# Patient Record
Sex: FEMALE | Race: WHITE | Employment: OTHER | URBAN - METROPOLITAN AREA
[De-identification: names, ages, dates, MRNs, and addresses within clinical notes are randomized per-mention and may not be internally consistent; named-entity substitution may affect disease eponyms.]

---

## 2017-02-01 ENCOUNTER — APPOINTMENT (OUTPATIENT)
Dept: GENERAL RADIOLOGY | Age: 72
End: 2017-02-01
Attending: NURSE PRACTITIONER
Payer: MEDICARE

## 2017-02-01 ENCOUNTER — HOSPITAL ENCOUNTER (OUTPATIENT)
Age: 72
Discharge: HOME | End: 2017-02-01
Payer: MEDICARE

## 2017-02-01 VITALS
TEMPERATURE: 99 F | RESPIRATION RATE: 18 BRPM | OXYGEN SATURATION: 98 % | DIASTOLIC BLOOD PRESSURE: 60 MMHG | HEART RATE: 84 BPM | BODY MASS INDEX: 40.18 KG/M2 | WEIGHT: 250 LBS | SYSTOLIC BLOOD PRESSURE: 94 MMHG | HEIGHT: 66 IN

## 2017-02-01 DIAGNOSIS — J40 BRONCHITIS: Primary | ICD-10-CM

## 2017-02-01 PROCEDURE — 71020 XR CHEST PA + LAT CHEST (CPT=71020): CPT

## 2017-02-01 PROCEDURE — 94640 AIRWAY INHALATION TREATMENT: CPT

## 2017-02-01 PROCEDURE — 99204 OFFICE O/P NEW MOD 45 MIN: CPT

## 2017-02-01 PROCEDURE — 99203 OFFICE O/P NEW LOW 30 MIN: CPT

## 2017-02-01 RX ORDER — LISINOPRIL 20 MG/1
20 TABLET ORAL DAILY
COMMUNITY
End: 2017-03-16

## 2017-02-01 RX ORDER — MELOXICAM 15 MG/1
15 TABLET ORAL DAILY
COMMUNITY
End: 2017-05-11

## 2017-02-01 RX ORDER — IPRATROPIUM BROMIDE AND ALBUTEROL SULFATE 2.5; .5 MG/3ML; MG/3ML
3 SOLUTION RESPIRATORY (INHALATION) ONCE
Status: COMPLETED | OUTPATIENT
Start: 2017-02-01 | End: 2017-02-01

## 2017-02-01 RX ORDER — HYDROCHLOROTHIAZIDE 12.5 MG/1
12.5 CAPSULE, GELATIN COATED ORAL DAILY
COMMUNITY
End: 2017-05-11

## 2017-02-01 RX ORDER — PREDNISONE 20 MG/1
20 TABLET ORAL 2 TIMES DAILY
Qty: 10 TABLET | Refills: 0 | Status: SHIPPED | OUTPATIENT
Start: 2017-02-01 | End: 2017-02-06

## 2017-02-01 RX ORDER — SIMVASTATIN 5 MG
20 TABLET ORAL NIGHTLY
COMMUNITY
End: 2017-03-16

## 2017-02-01 RX ORDER — CODEINE PHOSPHATE AND GUAIFENESIN 10; 100 MG/5ML; MG/5ML
5 SOLUTION ORAL EVERY 6 HOURS PRN
Qty: 180 ML | Refills: 0 | Status: SHIPPED | OUTPATIENT
Start: 2017-02-01 | End: 2017-11-01

## 2017-02-01 RX ORDER — ALBUTEROL SULFATE 90 UG/1
2 AEROSOL, METERED RESPIRATORY (INHALATION) EVERY 4 HOURS PRN
Qty: 1 INHALER | Refills: 1 | Status: SHIPPED | OUTPATIENT
Start: 2017-02-01 | End: 2017-03-03

## 2017-02-01 RX ORDER — BENZONATATE 100 MG/1
100 CAPSULE ORAL 3 TIMES DAILY PRN
Qty: 30 CAPSULE | Refills: 0 | Status: SHIPPED | OUTPATIENT
Start: 2017-02-01 | End: 2017-03-02 | Stop reason: ALTCHOICE

## 2017-02-01 RX ORDER — DOXYCYCLINE HYCLATE 100 MG/1
100 CAPSULE ORAL 2 TIMES DAILY
Qty: 14 CAPSULE | Refills: 0 | Status: SHIPPED | OUTPATIENT
Start: 2017-02-01 | End: 2017-02-08

## 2017-02-01 NOTE — ED PROVIDER NOTES
Patient Seen in: [address] [facility]    History   Patient presents with:  Cough    Stated Complaint: coughing    HPI  82-year-old female who presents to the IC with complaints of slight productive cough for the past 2 weeks.   Patient states s  (90 Base) MCG/ACT Inhalation Aero Soln,  Inhale 2 puffs into the lungs every 4 (four) hours as needed. guaiFENesin-codeine (CHERATUSSIN AC) 100-10 MG/5ML Oral Solution,  Take 5 mL by mouth every 6 (six) hours as needed for cough.        No family sounds and intact distal pulses. Pulmonary/Chest: Effort normal. No accessory muscle usage. No respiratory distress. She has no wheezes. She has no rhonchi. She has rales in the right lower field and the left lower field. She exhibits no tenderness. Patient states they understand diagnosis, followup plan and agree with and understand  discharge instructions and plan. I answered all of the patient's questions prior to discharge.      Disposition and Plan     Clinical Impression:  Bronchitis  (primary en

## 2017-03-02 ENCOUNTER — TELEPHONE (OUTPATIENT)
Dept: FAMILY MEDICINE CLINIC | Facility: CLINIC | Age: 72
End: 2017-03-02

## 2017-03-02 ENCOUNTER — OFFICE VISIT (OUTPATIENT)
Dept: FAMILY MEDICINE CLINIC | Facility: CLINIC | Age: 72
End: 2017-03-02

## 2017-03-02 VITALS
BODY MASS INDEX: 42 KG/M2 | RESPIRATION RATE: 18 BRPM | HEART RATE: 70 BPM | SYSTOLIC BLOOD PRESSURE: 132 MMHG | WEIGHT: 260.63 LBS | DIASTOLIC BLOOD PRESSURE: 70 MMHG | TEMPERATURE: 99 F

## 2017-03-02 DIAGNOSIS — I10 ESSENTIAL HYPERTENSION: ICD-10-CM

## 2017-03-02 DIAGNOSIS — Z11.59 NEED FOR HEPATITIS C SCREENING TEST: ICD-10-CM

## 2017-03-02 DIAGNOSIS — G89.29 CHRONIC KNEE PAIN, UNSPECIFIED LATERALITY: ICD-10-CM

## 2017-03-02 DIAGNOSIS — Z13.6 SCREENING FOR CARDIOVASCULAR CONDITION: ICD-10-CM

## 2017-03-02 DIAGNOSIS — C50.919 MALIGNANT NEOPLASM OF FEMALE BREAST, UNSPECIFIED LATERALITY, UNSPECIFIED SITE OF BREAST: ICD-10-CM

## 2017-03-02 DIAGNOSIS — Z13.39 SCREENING FOR ALCOHOL PROBLEM: ICD-10-CM

## 2017-03-02 DIAGNOSIS — Z00.00 ENCOUNTER FOR ANNUAL HEALTH EXAMINATION: Primary | ICD-10-CM

## 2017-03-02 DIAGNOSIS — E78.5 HYPERLIPIDEMIA, UNSPECIFIED HYPERLIPIDEMIA TYPE: ICD-10-CM

## 2017-03-02 DIAGNOSIS — Z13.31 DEPRESSION SCREENING: ICD-10-CM

## 2017-03-02 DIAGNOSIS — M25.569 CHRONIC KNEE PAIN, UNSPECIFIED LATERALITY: ICD-10-CM

## 2017-03-02 DIAGNOSIS — C50.919 MALIGNANT NEOPLASM OF FEMALE BREAST, UNSPECIFIED LATERALITY, UNSPECIFIED SITE OF BREAST: Primary | ICD-10-CM

## 2017-03-02 DIAGNOSIS — Z23 NEED FOR VACCINATION: ICD-10-CM

## 2017-03-02 DIAGNOSIS — Z13.1 SCREENING FOR DIABETES MELLITUS (DM): ICD-10-CM

## 2017-03-02 LAB
ALBUMIN SERPL-MCNC: 4.1 G/DL (ref 3.5–4.8)
ALP LIVER SERPL-CCNC: 85 U/L (ref 55–142)
ALT SERPL-CCNC: 45 U/L (ref 14–54)
AST SERPL-CCNC: 30 U/L (ref 15–41)
BASOPHILS # BLD AUTO: 0.06 X10(3) UL (ref 0–0.1)
BASOPHILS NFR BLD AUTO: 0.9 %
BILIRUB SERPL-MCNC: 0.3 MG/DL (ref 0.1–2)
BUN BLD-MCNC: 28 MG/DL (ref 8–20)
CALCIUM BLD-MCNC: 9.6 MG/DL (ref 8.3–10.3)
CHLORIDE: 109 MMOL/L (ref 101–111)
CHOLEST SMN-MCNC: 167 MG/DL (ref ?–200)
CO2: 28 MMOL/L (ref 22–32)
CREAT BLD-MCNC: 0.8 MG/DL (ref 0.55–1.02)
EOSINOPHIL # BLD AUTO: 0.27 X10(3) UL (ref 0–0.3)
EOSINOPHIL NFR BLD AUTO: 4.1 %
ERYTHROCYTE [DISTWIDTH] IN BLOOD BY AUTOMATED COUNT: 14.3 % (ref 11.5–16)
GLUCOSE BLD-MCNC: 89 MG/DL (ref 70–99)
HCT VFR BLD AUTO: 42 % (ref 34–50)
HDLC SERPL-MCNC: 49 MG/DL (ref 45–?)
HDLC SERPL: 3.41 {RATIO} (ref ?–4.44)
HGB BLD-MCNC: 13.9 G/DL (ref 12–16)
IMMATURE GRANULOCYTE COUNT: 0.02 X10(3) UL (ref 0–1)
IMMATURE GRANULOCYTE RATIO %: 0.3 %
LDLC SERPL CALC-MCNC: 69 MG/DL (ref ?–130)
LYMPHOCYTES # BLD AUTO: 2.59 X10(3) UL (ref 0.9–4)
LYMPHOCYTES NFR BLD AUTO: 39.3 %
M PROTEIN MFR SERPL ELPH: 7.3 G/DL (ref 6.1–8.3)
MCH RBC QN AUTO: 31.2 PG (ref 27–33.2)
MCHC RBC AUTO-ENTMCNC: 33.1 G/DL (ref 31–37)
MCV RBC AUTO: 94.4 FL (ref 81–100)
MONOCYTES # BLD AUTO: 0.6 X10(3) UL (ref 0.1–0.6)
MONOCYTES NFR BLD AUTO: 9.1 %
NEUTROPHIL ABS PRELIM: 3.05 X10 (3) UL (ref 1.3–6.7)
NEUTROPHILS # BLD AUTO: 3.05 X10(3) UL (ref 1.3–6.7)
NEUTROPHILS NFR BLD AUTO: 46.3 %
NONHDLC SERPL-MCNC: 118 MG/DL (ref ?–130)
PLATELET # BLD AUTO: 283 10(3)UL (ref 150–450)
POTASSIUM SERPL-SCNC: 4.2 MMOL/L (ref 3.6–5.1)
RBC # BLD AUTO: 4.45 X10(6)UL (ref 3.8–5.1)
RED CELL DISTRIBUTION WIDTH-SD: 49.6 FL (ref 35.1–46.3)
SODIUM SERPL-SCNC: 145 MMOL/L (ref 136–144)
TRIGLYCERIDES: 247 MG/DL (ref ?–150)
TSI SER-ACNC: 0.81 MIU/ML (ref 0.35–5.5)
VLDL: 49 MG/DL (ref 5–40)
WBC # BLD AUTO: 6.6 X10(3) UL (ref 4–13)

## 2017-03-02 PROCEDURE — 80061 LIPID PANEL: CPT | Performed by: FAMILY MEDICINE

## 2017-03-02 PROCEDURE — 86803 HEPATITIS C AB TEST: CPT | Performed by: FAMILY MEDICINE

## 2017-03-02 PROCEDURE — 85025 COMPLETE CBC W/AUTO DIFF WBC: CPT | Performed by: FAMILY MEDICINE

## 2017-03-02 PROCEDURE — 80053 COMPREHEN METABOLIC PANEL: CPT | Performed by: FAMILY MEDICINE

## 2017-03-02 PROCEDURE — 96160 PT-FOCUSED HLTH RISK ASSMT: CPT | Performed by: FAMILY MEDICINE

## 2017-03-02 PROCEDURE — 84443 ASSAY THYROID STIM HORMONE: CPT | Performed by: FAMILY MEDICINE

## 2017-03-02 NOTE — PROGRESS NOTES
HPI:   Roxana Reynaga is a 73year old female who presents for a MA (Medicare Advantage) [facility] (Once per calendar year). Under treatment for breast cancer.      Her last annual assessment has been over 1 year: Annual Physical due on 10/20 (3/13/09); other surgical history (1/20/10); other surgical history (12/11/12); other surgical history (2/6/15); and other surgical history (1/5/16). Her family history is not on file. SOCIAL HISTORY:   She  reports that she has never smoked.  She does Influenza, Pneumococcal, Zoster, Tetanus   There is no immunization history for the selected administration types on file for this patient.     ASSESSMENT AND OTHER RELEVANT CHRONIC CONDITIONS:   Keri Neri is a 73year old female who presents lipids. - Lipid Panel; Future  - Lipid Panel    3. Encounter for annual health examination  Check labs. - Lipid Panel; Future  - HCV AB for  1945 thru 1965 (Once in a lifetime);  Future  - Depression Screening (Medicare, Annual) []  - Annual Alc not have a Power of  for [employer] on file in [address] [place] [address] Rd. Discussed with patient and patient refused resource information          PLAN:  The patient indicates understanding of these issues and agrees to the plan. No Follow-up on file.      Rubens Quinones you have any tripping hazards?: 0-No    Are you on multiple medications?: 1-Yes    Does pain affect your day to day activities?: 1-Yes (right knee pain)     Have you had any memory issues?: 0-No    Fall/Risk Scoring: 3    Scoring Interpretation: 0 - 3 No R Diabetics, FHx Glaucoma, AA>50, > 65 No flowsheet data found. Bone Density Screening      Dexascan Every two years No results found for this or any previous visit. No flowsheet data found.     Pap and Pelvic      Pap: Every 3 yrs age 21-65 or Pap Template: CHRIS MAYA MEDICARE ANNUAL ASSESSMENT FEMALE [73464]

## 2017-03-02 NOTE — PATIENT INSTRUCTIONS
Kaylin Hernandez's SCREENING SCHEDULE   Tests on this list are recommended by your physician but may not be covered, or covered at this frequency, by your insurer. Please check with your insurance carrier before scheduling to verify coverage.    PA every 5 years No results found for this or any previous visit. No flowsheet data found. Fecal Occult Blood   Covered Annually No results found for: FOB, OCCULTSTOOL No flowsheet data found.      Barium Enema-   uncomfortable but covered  Covered but unc orders found for this or any previous visit.  Medium/high risk factors:   End-stage renal disease   Hemophiliacs who received Factor VIII or IX concentrates   Clients of institutions for the mentally retarded   Persons who live in the same house as a HepB v Patient must be diagnosed with one of the following:   • Hypertension   • Dyslipidemia   • Obesity (BMI ³30 kg/m2)   • Previous elevated impaired FBS or GTT   … or any two of the following:   • Overweight (BMI ³25 but <30)   • Family history of diabetes group, make sure you have a referral   Bone Density Screening      Bone density screening   Covered every 2 yrs after age 72    Covered yearly for Long term Glucocorticoid medication (Steroids) Requires diagnosis related to osteoporosis or estrogen deficie may be covered with your prescription benefits, but Medicare does not cover unless Medically needed    Zoster (Not covered by Medicare Part B) No orders found for this or any previous visit.  This may be covered with your pharmacy  prescription benefits

## 2017-03-03 LAB — HEPATITIS C VIRUS AB INTERPRETATION: NONREACTIVE

## 2017-03-16 RX ORDER — SIMVASTATIN 5 MG
20 TABLET ORAL NIGHTLY
Qty: 90 TABLET | Refills: 0 | Status: SHIPPED | OUTPATIENT
Start: 2017-03-16 | End: 2017-04-27

## 2017-03-16 RX ORDER — LISINOPRIL 20 MG/1
20 TABLET ORAL DAILY
Qty: 90 TABLET | Refills: 0 | Status: SHIPPED | OUTPATIENT
Start: 2017-03-16 | End: 2017-04-27

## 2017-03-16 NOTE — TELEPHONE ENCOUNTER
LOV: 3/2/17 for annual  Physical  BP at time of visit: 132/70      simvastatin 5 MG Oral Tab         Sig :  Take 20 mg by mouth nightly.       Route:   Oral        lisinopril 20 MG Oral Tab         Sig :  Take 20 mg by mouth daily.       Route:   Oral

## 2017-03-21 ENCOUNTER — TELEPHONE (OUTPATIENT)
Dept: FAMILY MEDICINE CLINIC | Facility: CLINIC | Age: 72
End: 2017-03-21

## 2017-03-21 NOTE — TELEPHONE ENCOUNTER
Advised patient not to stop taking her medication at this time. Educated patient on possible adverse side effects regarding discontinuing medication (lisinopril and simvastatin) without order of physician.   Patient wanted to know if she could stop taking

## 2017-04-27 PROBLEM — E66.01 SEVERE OBESITY (BMI >= 40) (HCC): Chronic | Status: ACTIVE | Noted: 2017-04-27

## 2017-04-27 NOTE — TELEPHONE ENCOUNTER
LOV: 3/2/17 for annual health exam    lisinopril 20 MG Oral Tab 90 tablet 0 3/16/2017       Sig :  Take 1 tablet (20 mg total) by mouth daily.       Route:   Oral       simvastatin 5 MG Oral Tab 90 tablet 0 3/16/2017       Sig :  Take 4 tablets (20 mg tota

## 2017-04-28 RX ORDER — LISINOPRIL 20 MG/1
20 TABLET ORAL DAILY
Qty: 90 TABLET | Refills: 0 | Status: SHIPPED | OUTPATIENT
Start: 2017-04-28 | End: 2017-09-18

## 2017-04-28 RX ORDER — SIMVASTATIN 5 MG
20 TABLET ORAL NIGHTLY
Qty: 90 TABLET | Refills: 0 | Status: SHIPPED | OUTPATIENT
Start: 2017-04-28 | End: 2017-09-18

## 2017-05-11 ENCOUNTER — OFFICE VISIT (OUTPATIENT)
Dept: HEMATOLOGY/ONCOLOGY | Age: 72
End: 2017-05-11
Attending: INTERNAL MEDICINE
Payer: MEDICARE

## 2017-05-11 VITALS
TEMPERATURE: 99 F | BODY MASS INDEX: 40 KG/M2 | OXYGEN SATURATION: 93 % | DIASTOLIC BLOOD PRESSURE: 69 MMHG | HEART RATE: 72 BPM | RESPIRATION RATE: 20 BRPM | WEIGHT: 248 LBS | SYSTOLIC BLOOD PRESSURE: 116 MMHG

## 2017-05-11 DIAGNOSIS — C50.919 MALIGNANT NEOPLASM OF FEMALE BREAST (HCC): Primary | ICD-10-CM

## 2017-05-11 DIAGNOSIS — Z12.31 ENCOUNTER FOR SCREENING MAMMOGRAM FOR MALIGNANT NEOPLASM OF BREAST: ICD-10-CM

## 2017-05-11 PROCEDURE — 99205 OFFICE O/P NEW HI 60 MIN: CPT | Performed by: INTERNAL MEDICINE

## 2017-05-11 RX ORDER — GARLIC EXTRACT 500 MG
1 CAPSULE ORAL DAILY
COMMUNITY
End: 2017-12-27 | Stop reason: ALTCHOICE

## 2017-05-11 RX ORDER — MULTIVIT-MIN/IRON/FOLIC ACID/K 18-600-40
CAPSULE ORAL
COMMUNITY
End: 2018-02-13

## 2017-05-11 RX ORDER — CHLORAL HYDRATE 500 MG
1000 CAPSULE ORAL DAILY
COMMUNITY
End: 2018-02-13

## 2017-05-11 NOTE — CONSULTS
Cancer Center Report of Consultation    Patient Name: Negin Ho   YOB: 1945   Medical Record Number: [id number]   CSN: [id number]   Consulting Physician: Brenda Carlos M.D.    Referring Physician: Aminata Rainey    Date of Consult eye    CATARACT  10/28/08    Comment Left eye    COLONOSCOPY  11/17/09    Comment Repeat in 10 years    COLONOSCOPY  1/26/16    OTHER SURGICAL HISTORY  2/11/93    Comment Fractured L ankle.  1 plate and 8 scres    OTHER SURGICAL HISTORY  11/19/08    Comment for 14 days and stop, Disp: 85 g, Rfl: 0  •  guaiFENesin-codeine (CHERATUSSIN AC) 100-10 MG/5ML Oral Solution, Take 5 mL by mouth every 6 (six) hours as needed for cough. , Disp: 180 mL, Rfl: 0    Allergies:    Sulfa Antibiotics       Hives, Swelling     Re palpable lymph nodes in the cervical, supraclavicular, axillary or inguinal area. Respiratory Normal - Lungs are clear to auscultation without rhonchi or wheezing.    Cardiovascular Normal - Regular rate and rhythm of heart without murmurs, gallops or rub CHOL Latest Ref Range: <130 mg/dL 118   LDL Cholesterol Calc Latest Ref Range: <130 mg/dL 69   TSH Latest Ref Range: 0.350-5.500 mIU/mL 0.813   HCV AB Latest Ref Range: Nonreactive   Nonreactive   WBC Latest Ref Range: 4.0-13.0 x10(3) uL 6.6   Hemoglobin L continue to monitor with exam q6 months and annual mammogram. The patient requests that a mammogram with brenton be ordered, even if she must pay a larger copay. Planned Follow Up:  6 months    I spent 60 minutes face to face with the patient.   More than 5

## 2017-05-16 PROBLEM — M17.12 PRIMARY OSTEOARTHRITIS OF LEFT KNEE: Status: ACTIVE | Noted: 2017-05-16

## 2017-05-16 PROBLEM — M16.0 PRIMARY OSTEOARTHRITIS OF BOTH HIPS: Status: ACTIVE | Noted: 2017-05-16

## 2017-05-16 PROBLEM — M17.11 PRIMARY OSTEOARTHRITIS OF RIGHT KNEE: Status: ACTIVE | Noted: 2017-05-16

## 2017-05-30 ENCOUNTER — OFFICE VISIT (OUTPATIENT)
Dept: FAMILY MEDICINE CLINIC | Facility: CLINIC | Age: 72
End: 2017-05-30

## 2017-05-30 VITALS
BODY MASS INDEX: 39.05 KG/M2 | TEMPERATURE: 98 F | HEART RATE: 66 BPM | SYSTOLIC BLOOD PRESSURE: 145 MMHG | WEIGHT: 243 LBS | RESPIRATION RATE: 16 BRPM | HEIGHT: 66 IN | DIASTOLIC BLOOD PRESSURE: 88 MMHG

## 2017-05-30 DIAGNOSIS — R35.0 URINARY FREQUENCY: Primary | ICD-10-CM

## 2017-05-30 DIAGNOSIS — N30.00 ACUTE CYSTITIS WITHOUT HEMATURIA: ICD-10-CM

## 2017-05-30 PROCEDURE — 99214 OFFICE O/P EST MOD 30 MIN: CPT | Performed by: FAMILY MEDICINE

## 2017-05-30 PROCEDURE — 81003 URINALYSIS AUTO W/O SCOPE: CPT | Performed by: FAMILY MEDICINE

## 2017-05-30 PROCEDURE — 87186 SC STD MICRODIL/AGAR DIL: CPT | Performed by: FAMILY MEDICINE

## 2017-05-30 PROCEDURE — 87088 URINE BACTERIA CULTURE: CPT | Performed by: FAMILY MEDICINE

## 2017-05-30 PROCEDURE — 87086 URINE CULTURE/COLONY COUNT: CPT | Performed by: FAMILY MEDICINE

## 2017-05-30 RX ORDER — CIPROFLOXACIN 500 MG/1
TABLET, FILM COATED ORAL
Qty: 10 TABLET | Refills: 0 | Status: SHIPPED | OUTPATIENT
Start: 2017-05-30 | End: 2017-11-01 | Stop reason: ALTCHOICE

## 2017-05-30 NOTE — PROGRESS NOTES
CC: urinary symptoms    HPI:      Location urethral/bladder  Quality frequency, urgency  Severity moderate  Duration couple days    ROS:     GENERAL HEALTH: feels well otherwise  SKIN: denies any unusual skin lesions or rashes  RESPIRATORY: denies shortnes

## 2017-06-26 ENCOUNTER — TELEPHONE (OUTPATIENT)
Dept: FAMILY MEDICINE CLINIC | Facility: CLINIC | Age: 72
End: 2017-06-26

## 2017-06-26 ENCOUNTER — NURSE ONLY (OUTPATIENT)
Dept: FAMILY MEDICINE CLINIC | Facility: CLINIC | Age: 72
End: 2017-06-26

## 2017-06-26 DIAGNOSIS — R33.8 ACUTE URINARY RETENTION: ICD-10-CM

## 2017-06-26 DIAGNOSIS — R33.8 ACUTE URINARY RETENTION: Primary | ICD-10-CM

## 2017-06-26 PROCEDURE — 87086 URINE CULTURE/COLONY COUNT: CPT | Performed by: FAMILY MEDICINE

## 2017-06-26 NOTE — TELEPHONE ENCOUNTER
LOV  5/30/17 for UTI  Patient started on CIPRO BID for 5 days. Patient stated she was suppose to drop off a urine specimen to Check for UTI after completing CIPRO. No note in patient's chart that this was the plan.   Patient stated she sometimes she feels

## 2017-09-18 RX ORDER — LISINOPRIL 20 MG/1
20 TABLET ORAL DAILY
Qty: 90 TABLET | Refills: 0 | Status: SHIPPED | OUTPATIENT
Start: 2017-09-18 | End: 2017-12-27 | Stop reason: ALTCHOICE

## 2017-09-18 RX ORDER — SIMVASTATIN 5 MG
20 TABLET ORAL NIGHTLY
Qty: 90 TABLET | Refills: 0 | Status: SHIPPED | OUTPATIENT
Start: 2017-09-18 | End: 2017-11-01

## 2017-09-18 NOTE — TELEPHONE ENCOUNTER
LOV: 5/30/17 for urinary frequency  /88  Lipid: 3/2/17    lisinopril 20 MG Oral Tab 90 tablet 0 4/28/2017    Sig :  Take 1 tablet (20 mg total) by mouth daily.      Route:   Oral       simvastatin 5 MG Oral Tab 90 tablet 0 4/28/2017    Sig :  Take 4 t

## 2017-10-04 ENCOUNTER — TELEPHONE (OUTPATIENT)
Dept: HEMATOLOGY/ONCOLOGY | Facility: HOSPITAL | Age: 72
End: 2017-10-04

## 2017-10-04 NOTE — TELEPHONE ENCOUNTER
Has not scheduled appointment for Mammogram or MD visit. Instructed to call central scheduling to arrange appt. She will need to have disc from last 2 facilities for radiology to compare. Verbalized understanding.

## 2017-10-20 ENCOUNTER — MED REC SCAN ONLY (OUTPATIENT)
Dept: FAMILY MEDICINE CLINIC | Facility: CLINIC | Age: 72
End: 2017-10-20

## 2017-10-27 ENCOUNTER — TELEPHONE (OUTPATIENT)
Dept: HEMATOLOGY/ONCOLOGY | Facility: HOSPITAL | Age: 72
End: 2017-10-27

## 2017-11-01 ENCOUNTER — OFFICE VISIT (OUTPATIENT)
Dept: FAMILY MEDICINE CLINIC | Facility: CLINIC | Age: 72
End: 2017-11-01

## 2017-11-01 VITALS
HEART RATE: 66 BPM | TEMPERATURE: 97 F | WEIGHT: 223 LBS | DIASTOLIC BLOOD PRESSURE: 68 MMHG | SYSTOLIC BLOOD PRESSURE: 140 MMHG | BODY MASS INDEX: 35.84 KG/M2 | HEIGHT: 66 IN | OXYGEN SATURATION: 98 % | RESPIRATION RATE: 20 BRPM

## 2017-11-01 DIAGNOSIS — E66.01 SEVERE OBESITY (BMI >= 40) (HCC): Chronic | ICD-10-CM

## 2017-11-01 DIAGNOSIS — H10.9 CONJUNCTIVITIS OF BOTH EYES, UNSPECIFIED CONJUNCTIVITIS TYPE: Primary | ICD-10-CM

## 2017-11-01 PROCEDURE — 99213 OFFICE O/P EST LOW 20 MIN: CPT | Performed by: FAMILY MEDICINE

## 2017-11-01 RX ORDER — CIPROFLOXACIN HYDROCHLORIDE 3.5 MG/ML
1 SOLUTION/ DROPS TOPICAL 4 TIMES DAILY
Qty: 1 BOTTLE | Refills: 0 | Status: SHIPPED | OUTPATIENT
Start: 2017-11-01 | End: 2017-11-06

## 2017-11-01 NOTE — TELEPHONE ENCOUNTER
LOV-5/30/17 urinary, 3/2/17 annual physical  LRF-9/18/17 #90+0  Future Appointments  Date Time Provider Cristina Hanna   12/18/2017 9:20 AM PF CAMMIE RM1 PF [facility]ON[facility] [place]   12/19/2017 1:45 PM Cathy Carlisle MD [facility]

## 2017-11-01 NOTE — PROGRESS NOTES
HPI:    Patient ID: Yvonne Birch is a 81year old female. Patient presents with:  Eye Problem    HPI  Left eye redness for 2-3 days  Right eye red today. Woke with crusty drainage in left eye. No eye pain or itchy.       Review of Systems   Constitu LUMPECTOMY LEFT      Comment: L breast lumpectomy w/sentinel lymph node                biopsy and lymph node dissection  2/11/93: OTHER SURGICAL HISTORY      Comment: Fractured L ankle.  1 plate and 8 scres  28/37/80: OTHER SURGICAL HISTORY      Comment: Or this encounter. Meds This Visit:  Signed Prescriptions Disp Refills    ciprofloxacin HCl 0.3 % Ophthalmic Solution 1 Bottle 0      Sig: Place 1 drop into both eyes 4 (four) times daily.            Imaging & Referrals:  None        #1103

## 2017-11-02 RX ORDER — SIMVASTATIN 5 MG
20 TABLET ORAL NIGHTLY
Qty: 90 TABLET | Refills: 0 | Status: SHIPPED | OUTPATIENT
Start: 2017-11-02 | End: 2017-11-02

## 2017-11-02 RX ORDER — SIMVASTATIN 20 MG
20 TABLET ORAL NIGHTLY
Qty: 90 TABLET | Refills: 0 | Status: SHIPPED | OUTPATIENT
Start: 2017-11-02 | End: 2017-12-27

## 2017-11-07 ENCOUNTER — OFFICE VISIT (OUTPATIENT)
Dept: FAMILY MEDICINE CLINIC | Facility: CLINIC | Age: 72
End: 2017-11-07

## 2017-11-07 ENCOUNTER — TELEPHONE (OUTPATIENT)
Dept: FAMILY MEDICINE CLINIC | Facility: CLINIC | Age: 72
End: 2017-11-07

## 2017-11-07 VITALS
HEIGHT: 66 IN | HEART RATE: 72 BPM | DIASTOLIC BLOOD PRESSURE: 66 MMHG | TEMPERATURE: 99 F | SYSTOLIC BLOOD PRESSURE: 134 MMHG | OXYGEN SATURATION: 96 % | BODY MASS INDEX: 35.68 KG/M2 | RESPIRATION RATE: 16 BRPM | WEIGHT: 222 LBS

## 2017-11-07 DIAGNOSIS — J01.10 ACUTE NON-RECURRENT FRONTAL SINUSITIS: Primary | ICD-10-CM

## 2017-11-07 PROCEDURE — 99214 OFFICE O/P EST MOD 30 MIN: CPT | Performed by: FAMILY MEDICINE

## 2017-11-07 RX ORDER — AZITHROMYCIN 250 MG/1
TABLET, FILM COATED ORAL
Qty: 6 TABLET | Refills: 0 | Status: SHIPPED | OUTPATIENT
Start: 2017-11-07 | End: 2017-12-27 | Stop reason: ALTCHOICE

## 2017-11-07 RX ORDER — CIPROFLOXACIN HYDROCHLORIDE 3.5 MG/ML
SOLUTION/ DROPS TOPICAL
COMMUNITY
End: 2017-12-27 | Stop reason: ALTCHOICE

## 2017-11-07 NOTE — TELEPHONE ENCOUNTER
Pink Eye is not better. She said that it was mentioned that another medicine but work if this doesn't but it has sulfer in it and she is allergic.

## 2017-11-07 NOTE — TELEPHONE ENCOUNTER
Patient was seen by Dr. Carey Sevilla 11/1  Patients pink eye has not resolved. Patient is taking cipro drops for eyes. Patient states she has congestion. Advised patient to follow up with Dr. Ivania Bullock for re-eval.  Patient verbalized understanding.   Apt schedule

## 2017-11-07 NOTE — PROGRESS NOTES
CC: congestion    HPI:     Location frontal  Quality pressure, drainage  Severity moderate  Duration couple weeks  Timing frequent  Associated symptoms cough    ROS: no vomiting or diarrhea, no  Rash, eyes still slightly irritated    Past Medical History:

## 2017-11-08 ENCOUNTER — PATIENT OUTREACH (OUTPATIENT)
Dept: FAMILY MEDICINE CLINIC | Facility: CLINIC | Age: 72
End: 2017-11-08

## 2017-11-20 ENCOUNTER — TELEPHONE (OUTPATIENT)
Dept: FAMILY MEDICINE CLINIC | Facility: CLINIC | Age: 72
End: 2017-11-20

## 2017-11-20 NOTE — TELEPHONE ENCOUNTER
Patient seen 11/7/17 for sinus infection with Dr. Nicolette Dobson  Patient is currently out of town for the next week- [place]  No fever. No cough. Sinus pressure. Headache. Left ear pain. Red watery eyees  Patient states [employer] did not work.   Patient is out of

## 2017-11-20 NOTE — TELEPHONE ENCOUNTER
Pt saw  [facility] on 11/7 for sinus infection and was prescribed zpak. If no fever or facial pain, monitor for next 2-3 days. Use OTC decongestant for sx relief.

## 2017-11-30 ENCOUNTER — MED REC SCAN ONLY (OUTPATIENT)
Dept: FAMILY MEDICINE CLINIC | Facility: CLINIC | Age: 72
End: 2017-11-30

## 2017-11-30 ENCOUNTER — TELEPHONE (OUTPATIENT)
Dept: FAMILY MEDICINE CLINIC | Facility: CLINIC | Age: 72
End: 2017-11-30

## 2017-11-30 NOTE — TELEPHONE ENCOUNTER
Sent signed medical records request to [address] [place] [address] in [place], [place] for release of all records.

## 2017-12-14 ENCOUNTER — APPOINTMENT (OUTPATIENT)
Dept: HEMATOLOGY/ONCOLOGY | Age: 72
End: 2017-12-14
Attending: INTERNAL MEDICINE
Payer: MEDICARE

## 2017-12-18 ENCOUNTER — HOSPITAL ENCOUNTER (OUTPATIENT)
Dept: MAMMOGRAPHY | Age: 72
Discharge: HOME | End: 2017-12-18
Attending: INTERNAL MEDICINE
Payer: MEDICARE

## 2017-12-18 DIAGNOSIS — Z12.31 ENCOUNTER FOR SCREENING MAMMOGRAM FOR MALIGNANT NEOPLASM OF BREAST: ICD-10-CM

## 2017-12-18 DIAGNOSIS — C50.919 MALIGNANT NEOPLASM OF FEMALE BREAST (HCC): ICD-10-CM

## 2017-12-18 PROCEDURE — 77063 BREAST TOMOSYNTHESIS BI: CPT | Performed by: INTERNAL MEDICINE

## 2017-12-18 PROCEDURE — 77067 SCR MAMMO BI INCL CAD: CPT | Performed by: INTERNAL MEDICINE

## 2017-12-19 ENCOUNTER — OFFICE VISIT (OUTPATIENT)
Dept: HEMATOLOGY/ONCOLOGY | Facility: HOSPITAL | Age: 72
End: 2017-12-19
Attending: INTERNAL MEDICINE
Payer: MEDICARE

## 2017-12-19 VITALS
OXYGEN SATURATION: 93 % | HEIGHT: 66 IN | HEART RATE: 68 BPM | TEMPERATURE: 99 F | RESPIRATION RATE: 18 BRPM | BODY MASS INDEX: 35.81 KG/M2 | WEIGHT: 222.81 LBS | SYSTOLIC BLOOD PRESSURE: 116 MMHG | DIASTOLIC BLOOD PRESSURE: 75 MMHG

## 2017-12-19 DIAGNOSIS — C50.312 MALIGNANT NEOPLASM OF LOWER-INNER QUADRANT OF LEFT BREAST IN FEMALE, ESTROGEN RECEPTOR POSITIVE (HCC): Primary | ICD-10-CM

## 2017-12-19 DIAGNOSIS — Z17.0 MALIGNANT NEOPLASM OF LOWER-INNER QUADRANT OF LEFT BREAST IN FEMALE, ESTROGEN RECEPTOR POSITIVE (HCC): Primary | ICD-10-CM

## 2017-12-19 PROCEDURE — 99213 OFFICE O/P EST LOW 20 MIN: CPT | Performed by: INTERNAL MEDICINE

## 2017-12-27 ENCOUNTER — OFFICE VISIT (OUTPATIENT)
Dept: FAMILY MEDICINE CLINIC | Facility: CLINIC | Age: 72
End: 2017-12-27

## 2017-12-27 VITALS
TEMPERATURE: 99 F | HEART RATE: 67 BPM | OXYGEN SATURATION: 97 % | BODY MASS INDEX: 36.08 KG/M2 | DIASTOLIC BLOOD PRESSURE: 86 MMHG | HEIGHT: 66 IN | SYSTOLIC BLOOD PRESSURE: 134 MMHG | WEIGHT: 224.5 LBS

## 2017-12-27 DIAGNOSIS — E78.5 HYPERLIPIDEMIA, UNSPECIFIED HYPERLIPIDEMIA TYPE: ICD-10-CM

## 2017-12-27 DIAGNOSIS — Z23 NEED FOR VACCINATION: ICD-10-CM

## 2017-12-27 DIAGNOSIS — J30.89 ACUTE NONSEASONAL ALLERGIC RHINITIS DUE TO OTHER ALLERGEN: Primary | ICD-10-CM

## 2017-12-27 DIAGNOSIS — C50.919 MALIGNANT NEOPLASM OF FEMALE BREAST, UNSPECIFIED ESTROGEN RECEPTOR STATUS, UNSPECIFIED LATERALITY, UNSPECIFIED SITE OF BREAST (HCC): ICD-10-CM

## 2017-12-27 DIAGNOSIS — I10 ESSENTIAL HYPERTENSION: ICD-10-CM

## 2017-12-27 DIAGNOSIS — Z01.00 EYE EXAM, ROUTINE: ICD-10-CM

## 2017-12-27 PROBLEM — J30.9 ALLERGIC RHINITIS DUE TO ALLERGEN: Status: ACTIVE | Noted: 2017-12-27

## 2017-12-27 PROCEDURE — 90686 IIV4 VACC NO PRSV 0.5 ML IM: CPT | Performed by: FAMILY MEDICINE

## 2017-12-27 PROCEDURE — 99214 OFFICE O/P EST MOD 30 MIN: CPT | Performed by: FAMILY MEDICINE

## 2017-12-27 PROCEDURE — 90472 IMMUNIZATION ADMIN EACH ADD: CPT | Performed by: FAMILY MEDICINE

## 2017-12-27 PROCEDURE — 90715 TDAP VACCINE 7 YRS/> IM: CPT | Performed by: FAMILY MEDICINE

## 2017-12-27 PROCEDURE — G0008 ADMIN INFLUENZA VIRUS VAC: HCPCS | Performed by: FAMILY MEDICINE

## 2017-12-27 RX ORDER — FLUTICASONE PROPIONATE 50 MCG
2 SPRAY, SUSPENSION (ML) NASAL DAILY
Qty: 1 BOTTLE | Refills: 3 | COMMUNITY
Start: 2017-12-27 | End: 2018-02-13

## 2017-12-27 RX ORDER — VALSARTAN 80 MG/1
80 TABLET ORAL DAILY
Qty: 90 TABLET | Refills: 3 | Status: SHIPPED | OUTPATIENT
Start: 2017-12-27 | End: 2018-02-07 | Stop reason: SINTOL

## 2017-12-27 RX ORDER — SIMVASTATIN 20 MG
20 TABLET ORAL NIGHTLY
Qty: 90 TABLET | Refills: 3 | Status: SHIPPED | OUTPATIENT
Start: 2017-12-27 | End: 2018-04-11

## 2017-12-27 RX ORDER — FEXOFENADINE HCL 180 MG/1
180 TABLET ORAL DAILY
Qty: 90 TABLET | Refills: 0 | COMMUNITY
End: 2018-02-13

## 2017-12-27 NOTE — PROGRESS NOTES
Michaela Chery is a 81year old female. Patient presents with: Other: new pt, sinus issues for several months. ....discuss flu and pneumonia shot also. ...room 1      HPI:   Patient complains of sinus congestion that she has had for many months.   No fev OTHER SURGICAL HISTORY      Comment: L breast biopsy  Family History   Problem Relation Age of Onset   • Breast Cancer Self 78        Social History:  Smoking status: Never Smoker                                                              Smokeless tobac and continue them daily throughout the season. If symptoms do not respond to several different meds and combinations, may need formal allergy testing. Reviewed her medical history and previous records. Still need to receive her immunization records.   Christel

## 2018-01-18 ENCOUNTER — PATIENT MESSAGE (OUTPATIENT)
Dept: FAMILY MEDICINE CLINIC | Facility: CLINIC | Age: 73
End: 2018-01-18

## 2018-01-18 NOTE — TELEPHONE ENCOUNTER
Called pt. And let her know I do not see that we received the records and she should contact the facility they were coming from to check on the status.

## 2018-01-25 NOTE — PROGRESS NOTES
Cancer Center Progress Note  Patient Name: Hilary De La Garza   YOB: 1945   Medical Record Number: [id number]   CSN: [id number]   Attending Physician: Susanna Laughlin M.D.        Date of Visit: 1/25/2018     Chief Complaint:  Patient prese and 8 scres  11/19/08: OTHER SURGICAL HISTORY      Comment: Oral surgery-2 cyst and 2 wisdom teeth  3/13/09: OTHER SURGICAL HISTORY      Comment: Kidney stones removed  1/20/10: OTHER SURGICAL HISTORY      Comment: Colposcopy  12/11/12: OTHER SURGICAL HIST Citrate-Vitamin D (CITRACAL PETITES/VITAMIN D OR), Take by mouth., Disp: , Rfl:     Allergies:    Sulfa Antibiotics       Hives, Swelling  Lisinopril                  Comment:cough     Review of Systems:    Constitutional No fevers, chills, night sweats, e Integumentary Normal - No rashes, No Jaundice   Neurologic Normal - No sensory or motor deficits, normal cerebellar function, normal gait, cranial nerves intact. Psychiatric Normal - Alert and oriented times three. Coherent speech.  Verbalizes [place]

## 2018-02-01 ENCOUNTER — PATIENT OUTREACH (OUTPATIENT)
Dept: FAMILY MEDICINE CLINIC | Facility: CLINIC | Age: 73
End: 2018-02-01

## 2018-02-07 ENCOUNTER — PATIENT MESSAGE (OUTPATIENT)
Dept: FAMILY MEDICINE CLINIC | Facility: CLINIC | Age: 73
End: 2018-02-07

## 2018-02-07 RX ORDER — AMLODIPINE BESYLATE 5 MG/1
5 TABLET ORAL EVERY MORNING
Qty: 90 TABLET | Refills: 0 | Status: SHIPPED | OUTPATIENT
Start: 2018-02-07 | End: 2018-04-11

## 2018-02-07 NOTE — TELEPHONE ENCOUNTER
Yonathan Godinez RN 2/7/2018 10:33 AM CST        ----- Message -----  From: Lucina Cason  Sent: 2/7/2018 10:14 AM  To: [facility] Clinical Staff  Subject: Prescription Question     After starting my new prescription Valsartan (80 mg), I started coughing

## 2018-02-07 NOTE — TELEPHONE ENCOUNTER
From: Michaela Chery  To: Ramon Li DO  Sent: 2/7/2018 12:27 PM CST  Subject: Other    Thank you for your prompt reply. Could you please send the new prescription to [facility]? I think you have the information. Thanks again!  Claudetta Furlough

## 2018-02-07 NOTE — TELEPHONE ENCOUNTER
Remain off valsartan  Start amlodipine 5 mg 1 po q am  Recheck BP here in 2-3 weeks to make sure BP controlled

## 2018-02-27 ENCOUNTER — MED REC SCAN ONLY (OUTPATIENT)
Dept: FAMILY MEDICINE CLINIC | Facility: CLINIC | Age: 73
End: 2018-02-27

## 2018-04-11 ENCOUNTER — LAB ENCOUNTER (OUTPATIENT)
Dept: LAB | Age: 73
End: 2018-04-11
Attending: FAMILY MEDICINE
Payer: MEDICARE

## 2018-04-11 ENCOUNTER — OFFICE VISIT (OUTPATIENT)
Dept: FAMILY MEDICINE CLINIC | Facility: CLINIC | Age: 73
End: 2018-04-11

## 2018-04-11 ENCOUNTER — TELEPHONE (OUTPATIENT)
Dept: FAMILY MEDICINE CLINIC | Facility: CLINIC | Age: 73
End: 2018-04-11

## 2018-04-11 VITALS
WEIGHT: 229.5 LBS | HEART RATE: 63 BPM | TEMPERATURE: 99 F | SYSTOLIC BLOOD PRESSURE: 112 MMHG | OXYGEN SATURATION: 97 % | BODY MASS INDEX: 37.78 KG/M2 | DIASTOLIC BLOOD PRESSURE: 74 MMHG | HEIGHT: 65.5 IN

## 2018-04-11 DIAGNOSIS — M17.0 PRIMARY OSTEOARTHRITIS OF BOTH KNEES: ICD-10-CM

## 2018-04-11 DIAGNOSIS — E78.5 HYPERLIPIDEMIA, UNSPECIFIED HYPERLIPIDEMIA TYPE: ICD-10-CM

## 2018-04-11 DIAGNOSIS — I10 ESSENTIAL HYPERTENSION: ICD-10-CM

## 2018-04-11 DIAGNOSIS — C50.919 MALIGNANT NEOPLASM OF FEMALE BREAST, UNSPECIFIED ESTROGEN RECEPTOR STATUS, UNSPECIFIED LATERALITY, UNSPECIFIED SITE OF BREAST (HCC): ICD-10-CM

## 2018-04-11 DIAGNOSIS — Z23 NEED FOR VACCINATION: ICD-10-CM

## 2018-04-11 DIAGNOSIS — E66.9 OBESITY (BMI 30-39.9): ICD-10-CM

## 2018-04-11 DIAGNOSIS — Z00.00 ENCOUNTER FOR ANNUAL HEALTH EXAMINATION: Primary | ICD-10-CM

## 2018-04-11 DIAGNOSIS — I70.0 AORTIC CALCIFICATION (HCC): ICD-10-CM

## 2018-04-11 DIAGNOSIS — H54.7 VISION LOSS: ICD-10-CM

## 2018-04-11 PROBLEM — E66.01 SEVERE OBESITY (BMI 35.0-39.9) WITH COMORBIDITY (HCC): Chronic | Status: ACTIVE | Noted: 2018-04-11

## 2018-04-11 PROCEDURE — 90750 HZV VACC RECOMBINANT IM: CPT | Performed by: FAMILY MEDICINE

## 2018-04-11 PROCEDURE — 36415 COLL VENOUS BLD VENIPUNCTURE: CPT

## 2018-04-11 PROCEDURE — 80061 LIPID PANEL: CPT

## 2018-04-11 PROCEDURE — 80053 COMPREHEN METABOLIC PANEL: CPT

## 2018-04-11 PROCEDURE — 36415 COLL VENOUS BLD VENIPUNCTURE: CPT | Performed by: FAMILY MEDICINE

## 2018-04-11 PROCEDURE — G0008 ADMIN INFLUENZA VIRUS VAC: HCPCS | Performed by: FAMILY MEDICINE

## 2018-04-11 PROCEDURE — G0439 PPPS, SUBSEQ VISIT: HCPCS | Performed by: FAMILY MEDICINE

## 2018-04-11 PROCEDURE — 96160 PT-FOCUSED HLTH RISK ASSMT: CPT | Performed by: FAMILY MEDICINE

## 2018-04-11 RX ORDER — AMLODIPINE BESYLATE 5 MG/1
5 TABLET ORAL EVERY MORNING
Qty: 90 TABLET | Refills: 0 | Status: SHIPPED | OUTPATIENT
Start: 2018-04-11 | End: 2018-06-24

## 2018-04-11 RX ORDER — SIMVASTATIN 20 MG
20 TABLET ORAL NIGHTLY
Qty: 90 TABLET | Refills: 3 | Status: SHIPPED | OUTPATIENT
Start: 2018-04-11 | End: 2019-04-16

## 2018-04-11 RX ORDER — ACETAMINOPHEN 500 MG
500 TABLET ORAL AS NEEDED
Status: ON HOLD | COMMUNITY
End: 2018-06-27

## 2018-04-11 NOTE — PROGRESS NOTES
HPI:   Yareli Hollins is a 81year old female who presents for a MA (Medicare Advantage) Supervisit (Once per calendar year). Patient complains of a small spot of vision loss. She thinks it is her right eye.   At times she will have \"squiggly line Breast [facility] ([facility]     Hyperlipidemia     Hypertension     Obesity (BMI 30-39. 9)    Wt Readings from Last 3 Encounters:  12/27/17 : 224 lb 8 oz  12/19/17 : 222 lb 12.8 oz  11/07/17 : 222 lb     Last Cholesterol Labs:     Lab Results  Component Value Date history (3/13/09); other surgical history (1/20/10); other surgical history (12/11/12); other surgical history (2/6/15); and other surgical history (1/5/16).     Her family history includes Breast Cancer (age of onset: 76) in her self; Heart Attack in her f gallop   Abdomen:   Soft, non-tender, bowel sounds active all four quadrants,  no masses, no organomegaly   Pelvic: Deferred   Extremities: Extremities normal, atraumatic, no cyanosis or edema   Pulses: 2+ and symmetric   Skin: Skin color, texture, turgor breast, unspecified estrogen receptor status, unspecified laterality, unspecified site of breast ([facility]    Hyperlipidemia, unspecified hyperlipidemia type    Essential hypertension    Obesity (BMI 30-39. 9)    Aortic calcification (HCC)         Diet assessme results found for: CHLAMYDIA No flowsheet data found.     Screening Mammogram      Mammogram Annually to 78, then as discussed Mammogram,1 Yr due on 12/18/2018 Update Health Maintenance if applicable     Immunizations (Update Immunization Activity if applic

## 2018-04-11 NOTE — PATIENT INSTRUCTIONS
[facility] SCREENING SCHEDULE   Tests on this list are recommended by your physician but may not be covered, or covered at this frequency, by your insurer. Please check with your insurance carrier before scheduling to verify coverage.    John Alvarez more often if abnormal Colonoscopy,10 Years due on 01/26/2026 Update Health Maintenance if applicable    Flex Sigmoidoscopy Screen  Covered every 5 years No results found for this or any previous visit. No flowsheet data found.      Fecal Occult Blood   Cov 65 No orders found for this or any previous visit. Please get once after your 65th birthday    Hepatitis B for Moderate/High Risk       No orders found for this or any previous visit.  Medium/high risk factors:   End-stage renal disease   Hemophiliacs who r

## 2018-04-11 NOTE — TELEPHONE ENCOUNTER
Faxing the referral that was placed today     She also advised that she just received notice from mail order that she due for refills on her meds (mail order)

## 2018-04-12 NOTE — PROGRESS NOTES
Notify lipids normal. Recheck in 6  months. Notify chem normal including kidney function, electrolytes and nutritional markers. There is a very minimal increase in her liver enzymes. That may be  from the simvastatin but is not of any concern.   We will

## 2018-05-21 ENCOUNTER — PATIENT MESSAGE (OUTPATIENT)
Dept: FAMILY MEDICINE CLINIC | Facility: CLINIC | Age: 73
End: 2018-05-21

## 2018-05-21 NOTE — TELEPHONE ENCOUNTER
From: Magnolia Apo  To: Milli Ruiz DO  Sent: 5/21/2018 3:08 PM CDT  Subject: Non-Urgent Medical Question    Did Dr. Carlos Eduardo Kinney send a referral to Dr. Josefina Chapa for two knee replacement surgeries?  I saw Dr. Josefina Chapa this morning, and he said I w

## 2018-05-31 RX ORDER — IBUPROFEN 400 MG/1
400 TABLET ORAL EVERY 6 HOURS PRN
Status: ON HOLD | COMMUNITY
End: 2018-06-29

## 2018-06-11 ENCOUNTER — HOSPITAL ENCOUNTER (OUTPATIENT)
Dept: PHYSICAL THERAPY | Facility: HOSPITAL | Age: 73
Discharge: HOME | End: 2018-06-11
Attending: ORTHOPAEDIC SURGERY
Payer: MEDICARE

## 2018-06-11 DIAGNOSIS — M17.11 PRIMARY OSTEOARTHRITIS OF RIGHT KNEE: ICD-10-CM

## 2018-06-11 DIAGNOSIS — I10 HYPERTENSION, UNSPECIFIED TYPE: ICD-10-CM

## 2018-06-14 ENCOUNTER — OFFICE VISIT (OUTPATIENT)
Dept: FAMILY MEDICINE CLINIC | Facility: CLINIC | Age: 73
End: 2018-06-14

## 2018-06-14 ENCOUNTER — LAB ENCOUNTER (OUTPATIENT)
Dept: LAB | Age: 73
End: 2018-06-14
Attending: FAMILY MEDICINE
Payer: MEDICARE

## 2018-06-14 VITALS
DIASTOLIC BLOOD PRESSURE: 84 MMHG | RESPIRATION RATE: 16 BRPM | SYSTOLIC BLOOD PRESSURE: 128 MMHG | WEIGHT: 213.38 LBS | HEIGHT: 64 IN | HEART RATE: 88 BPM | BODY MASS INDEX: 36.43 KG/M2 | TEMPERATURE: 98 F

## 2018-06-14 DIAGNOSIS — M17.11 PRIMARY OSTEOARTHRITIS OF RIGHT KNEE: ICD-10-CM

## 2018-06-14 DIAGNOSIS — Z01.810 PRE-OPERATIVE CARDIOVASCULAR EXAMINATION: ICD-10-CM

## 2018-06-14 DIAGNOSIS — I10 HYPERTENSION, UNSPECIFIED TYPE: ICD-10-CM

## 2018-06-14 DIAGNOSIS — E78.5 HYPERLIPIDEMIA, UNSPECIFIED HYPERLIPIDEMIA TYPE: ICD-10-CM

## 2018-06-14 DIAGNOSIS — E66.01 SEVERE OBESITY (BMI >= 40) (HCC): ICD-10-CM

## 2018-06-14 DIAGNOSIS — Z01.810 PRE-OPERATIVE CARDIOVASCULAR EXAMINATION: Primary | ICD-10-CM

## 2018-06-14 PROCEDURE — 86850 RBC ANTIBODY SCREEN: CPT

## 2018-06-14 PROCEDURE — 86900 BLOOD TYPING SEROLOGIC ABO: CPT

## 2018-06-14 PROCEDURE — 86901 BLOOD TYPING SEROLOGIC RH(D): CPT

## 2018-06-14 PROCEDURE — 87081 CULTURE SCREEN ONLY: CPT

## 2018-06-14 PROCEDURE — 80048 BASIC METABOLIC PNL TOTAL CA: CPT

## 2018-06-14 PROCEDURE — 36415 COLL VENOUS BLD VENIPUNCTURE: CPT | Performed by: FAMILY MEDICINE

## 2018-06-14 PROCEDURE — 99214 OFFICE O/P EST MOD 30 MIN: CPT | Performed by: FAMILY MEDICINE

## 2018-06-14 PROCEDURE — 93000 ELECTROCARDIOGRAM COMPLETE: CPT | Performed by: FAMILY MEDICINE

## 2018-06-14 PROCEDURE — 36415 COLL VENOUS BLD VENIPUNCTURE: CPT

## 2018-06-14 PROCEDURE — 85025 COMPLETE CBC W/AUTO DIFF WBC: CPT

## 2018-06-14 RX ORDER — MULTIVIT-MIN/IRON FUM/FOLIC AC 7.5 MG-4
1 TABLET ORAL DAILY
COMMUNITY
End: 2019-05-20

## 2018-06-14 NOTE — H&P
Isabela Bell is a 72year old female who presents for a pre-operative physical exam.     Patient presents with:  Pre-Op Exam: Rt Total Knee Replacement on 6/27/18 @ [facility] by Jael VASQUEZ MD ~ EKG,CBC,BASICM,MSSA/MRSA-inrm . 2       HPI: 05/16/2017   • Pulmonary embolism ([facility] [address] 09/19/2003    Occurred after a long plane ride ON HRT   • Visual impairment     READERS      Past Surgical History:  10/14/08: CATARACT      Comment: Right eye  10/28/08: CATARACT      Comment: Left eye  2016: Lash[facility] Feeling BMI 36.63 kg/m²  Body mass index is 36.63 kg/m².   GENERAL: well developed, well nourished,in no apparent distress  SKIN: no rashes,no suspicious lesions  HEENT: atraumatic, normocephalic,ears and throat are clear  EYES:PERRLA, EOMI,conjunctiva are clear

## 2018-06-15 ENCOUNTER — TELEPHONE (OUTPATIENT)
Dept: FAMILY MEDICINE CLINIC | Facility: CLINIC | Age: 73
End: 2018-06-15

## 2018-06-18 ENCOUNTER — TELEPHONE (OUTPATIENT)
Dept: FAMILY MEDICINE CLINIC | Facility: CLINIC | Age: 73
End: 2018-06-18

## 2018-06-18 DIAGNOSIS — C50.919 MALIGNANT NEOPLASM OF FEMALE BREAST, UNSPECIFIED ESTROGEN RECEPTOR STATUS, UNSPECIFIED LATERALITY, UNSPECIFIED SITE OF BREAST (HCC): Primary | ICD-10-CM

## 2018-06-18 NOTE — TELEPHONE ENCOUNTER
Referral to Dr Nikole Woo for her 2 year cancer check- needs to be sent to Jose Roberts  80. office by the end of today or her Wednesday appointment with him will be cancelled

## 2018-06-19 ENCOUNTER — ANESTHESIA EVENT (OUTPATIENT)
Dept: SURGERY | Facility: HOSPITAL | Age: 73
End: 2018-06-19

## 2018-06-20 ENCOUNTER — OFFICE VISIT (OUTPATIENT)
Dept: HEMATOLOGY/ONCOLOGY | Age: 73
End: 2018-06-20
Attending: INTERNAL MEDICINE
Payer: MEDICARE

## 2018-06-20 VITALS
WEIGHT: 232.19 LBS | BODY MASS INDEX: 40 KG/M2 | RESPIRATION RATE: 18 BRPM | HEART RATE: 71 BPM | TEMPERATURE: 99 F | OXYGEN SATURATION: 94 %

## 2018-06-20 DIAGNOSIS — C50.919 MALIGNANT NEOPLASM OF FEMALE BREAST, UNSPECIFIED ESTROGEN RECEPTOR STATUS, UNSPECIFIED LATERALITY, UNSPECIFIED SITE OF BREAST (HCC): ICD-10-CM

## 2018-06-20 PROCEDURE — 99213 OFFICE O/P EST LOW 20 MIN: CPT | Performed by: INTERNAL MEDICINE

## 2018-06-20 NOTE — PROGRESS NOTES
Cancer Center Progress Note  Patient Name: Zach Segundo   YOB: 1945   Medical Record Number: [id number]   CSN: [id number]   Attending Physician: Cynthia Cheung M.D.        Date of Visit: 6/20/2018    Chief Complaint:  Patient presen Primary osteoarthritis of right knee 05/16/2017   • Pulmonary embolism ([address] 09/19/2003    Occurred after a long plane ride ON HRT   • Visual impairment     READERS       Past Surgical History:  Past Surgical History:  10/14/08: CATARACT      Comment: Ruth ibuprofen 400 MG Oral Tab, Take 400 mg by mouth every 6 (six) hours as needed for Pain., Disp: , Rfl:   •  GNP MAGNESIUM CITRATE OR, Take by mouth., Disp: , Rfl:   •  Misc Natural Products (OSTEO BI-FLEX JOINT SHIELD OR), Take 2 capsules by mouth daily. , D are clear to auscultation, no wheezing. Cardiovascular Normal - Regular rate and rhythm, no murmurs. Abdomen Normal - Non-tender, non-distended, no masses, ascites or hepatosplenomegaly. Extremities Normal - No cyanosis, clubbing or edema.     Integu

## 2018-06-25 RX ORDER — AMLODIPINE BESYLATE 5 MG/1
5 TABLET ORAL EVERY MORNING
Qty: 90 TABLET | Refills: 0 | Status: ON HOLD | OUTPATIENT
Start: 2018-06-25 | End: 2018-06-27

## 2018-06-25 RX ORDER — LISINOPRIL 20 MG/1
TABLET ORAL
Qty: 90 TABLET | Refills: 0 | OUTPATIENT
Start: 2018-06-25

## 2018-06-25 NOTE — TELEPHONE ENCOUNTER
Last rf 9/18/17 #90x0  Last ov 6/14/18  128/84    Medication was discontinued by Dr. Raimundo Dangelo on 12/27/17. Emanuel Simmons

## 2018-06-26 NOTE — H&P
[address] [place] [address] Patient Status:  Surgery Admit    1945 MRN [id number]   [facility] SURGERY Attending Jayant Servin MD   [facility] Day # 0 PCP Rudi Sharp DO     Date of Admission:  (Not on Comment: Colposcopy  12/11/12: OTHER SURGICAL HISTORY      Comment: R eye laser surgery-YAG Capsulotomy  2/6/15: OTHER SURGICAL HISTORY      Comment: L eye laser surgery-YAG Capsulotomy  1/5/16: OTHER SURGICAL HISTORY      Comment: L breast biopsy  Family

## 2018-06-27 ENCOUNTER — ANESTHESIA (OUTPATIENT)
Dept: SURGERY | Facility: HOSPITAL | Age: 73
End: 2018-06-27

## 2018-06-27 ENCOUNTER — SURGERY (OUTPATIENT)
Age: 73
End: 2018-06-27

## 2018-06-27 ENCOUNTER — HOSPITAL ENCOUNTER (INPATIENT)
Facility: HOSPITAL | Age: 73
LOS: 2 days | Discharge: HOME HEALTH | DRG: 470 | End: 2018-06-29
Attending: ORTHOPAEDIC SURGERY | Admitting: ORTHOPAEDIC SURGERY
Payer: MEDICARE

## 2018-06-27 DIAGNOSIS — M17.11 PRIMARY OSTEOARTHRITIS OF RIGHT KNEE: Primary | ICD-10-CM

## 2018-06-27 DIAGNOSIS — I10 HYPERTENSION, UNSPECIFIED TYPE: ICD-10-CM

## 2018-06-27 LAB
ATRIAL RATE: 93 BPM
BUN BLD-MCNC: 26 MG/DL (ref 8–20)
CALCIUM BLD-MCNC: 8.8 MG/DL (ref 8.3–10.3)
CHLORIDE: 111 MMOL/L (ref 101–111)
CO2: 23 MMOL/L (ref 22–32)
CREAT BLD-MCNC: 0.74 MG/DL (ref 0.55–1.02)
GLUCOSE BLD-MCNC: 130 MG/DL (ref 70–99)
HAV IGM SER QL: 1.7 MG/DL (ref 1.8–2.5)
PHOSPHATE SERPL-MCNC: 3.5 MG/DL (ref 2.5–4.9)
POTASSIUM SERPL-SCNC: 3.4 MMOL/L (ref 3.6–5.1)
Q-T INTERVAL: 434 MS
QRS DURATION: 168 MS
QTC CALCULATION (BEZET): 545 MS
R AXIS: -63 DEGREES
SODIUM SERPL-SCNC: 143 MMOL/L (ref 136–144)
T AXIS: 141 DEGREES
VENTRICULAR RATE: 95 BPM

## 2018-06-27 PROCEDURE — 3E0T3BZ INTRODUCTION OF ANESTHETIC AGENT INTO PERIPHERAL NERVES AND PLEXI, PERCUTANEOUS APPROACH: ICD-10-PCS | Performed by: ANESTHESIOLOGY

## 2018-06-27 PROCEDURE — 0SRC0J9 REPLACEMENT OF RIGHT KNEE JOINT WITH SYNTHETIC SUBSTITUTE, CEMENTED, OPEN APPROACH: ICD-10-PCS | Performed by: ORTHOPAEDIC SURGERY

## 2018-06-27 PROCEDURE — 99222 1ST HOSP IP/OBS MODERATE 55: CPT | Performed by: STUDENT IN AN ORGANIZED HEALTH CARE EDUCATION/TRAINING PROGRAM

## 2018-06-27 DEVICE — ATTUNE KNEE SYSTEM TIBIAL BASE ROTATING PLATFORM SIZE 5 CEMENTED
Type: IMPLANTABLE DEVICE | Site: KNEE | Status: FUNCTIONAL
Brand: ATTUNE

## 2018-06-27 DEVICE — ATTUNE PATELLA MEDIALIZED DOME 38MM CEMENTED AOX
Type: IMPLANTABLE DEVICE | Site: KNEE | Status: FUNCTIONAL
Brand: ATTUNE

## 2018-06-27 DEVICE — ATTUNE KNEE SYSTEM FEMORAL POSTERIOR STABILIZED SIZE 6 RIGHT CEMENTED
Type: IMPLANTABLE DEVICE | Site: KNEE | Status: FUNCTIONAL
Brand: ATTUNE

## 2018-06-27 DEVICE — SMARTSET HV HIGH VISCOSITY BONE CEMENT 40G
Type: IMPLANTABLE DEVICE | Site: KNEE | Status: FUNCTIONAL
Brand: SMARTSET

## 2018-06-27 DEVICE — ATTUNE KNEE SYSTEM TIBIAL INSERT ROTATING PLATFORM POSTERIOR STABILIZED 6 10MM AOX
Type: IMPLANTABLE DEVICE | Site: KNEE | Status: FUNCTIONAL
Brand: ATTUNE

## 2018-06-27 RX ORDER — DIPHENHYDRAMINE HYDROCHLORIDE 50 MG/ML
25 INJECTION INTRAMUSCULAR; INTRAVENOUS ONCE AS NEEDED
Status: ACTIVE | OUTPATIENT
Start: 2018-06-27 | End: 2018-06-27

## 2018-06-27 RX ORDER — ACETAMINOPHEN 325 MG/1
650 TABLET ORAL ONCE
Status: COMPLETED | OUTPATIENT
Start: 2018-06-27 | End: 2018-06-27

## 2018-06-27 RX ORDER — NALOXONE HYDROCHLORIDE 0.4 MG/ML
80 INJECTION, SOLUTION INTRAMUSCULAR; INTRAVENOUS; SUBCUTANEOUS AS NEEDED
Status: DISCONTINUED | OUTPATIENT
Start: 2018-06-27 | End: 2018-06-27 | Stop reason: HOSPADM

## 2018-06-27 RX ORDER — HYDROMORPHONE HYDROCHLORIDE 1 MG/ML
0.2 INJECTION, SOLUTION INTRAMUSCULAR; INTRAVENOUS; SUBCUTANEOUS EVERY 2 HOUR PRN
Status: ACTIVE | OUTPATIENT
Start: 2018-06-27 | End: 2018-06-29

## 2018-06-27 RX ORDER — SODIUM CHLORIDE, SODIUM LACTATE, POTASSIUM CHLORIDE, CALCIUM CHLORIDE 600; 310; 30; 20 MG/100ML; MG/100ML; MG/100ML; MG/100ML
INJECTION, SOLUTION INTRAVENOUS CONTINUOUS
Status: DISCONTINUED | OUTPATIENT
Start: 2018-06-27 | End: 2018-06-27 | Stop reason: HOSPADM

## 2018-06-27 RX ORDER — METOCLOPRAMIDE HYDROCHLORIDE 5 MG/ML
10 INJECTION INTRAMUSCULAR; INTRAVENOUS EVERY 6 HOURS PRN
Status: ACTIVE | OUTPATIENT
Start: 2018-06-27 | End: 2018-06-29

## 2018-06-27 RX ORDER — SODIUM CHLORIDE, SODIUM LACTATE, POTASSIUM CHLORIDE, CALCIUM CHLORIDE 600; 310; 30; 20 MG/100ML; MG/100ML; MG/100ML; MG/100ML
INJECTION, SOLUTION INTRAVENOUS CONTINUOUS
Status: DISCONTINUED | OUTPATIENT
Start: 2018-06-27 | End: 2018-06-29

## 2018-06-27 RX ORDER — ZOLPIDEM TARTRATE 5 MG/1
5 TABLET ORAL NIGHTLY PRN
Status: DISCONTINUED | OUTPATIENT
Start: 2018-06-27 | End: 2018-06-29

## 2018-06-27 RX ORDER — ACETAMINOPHEN 325 MG/1
650 TABLET ORAL 4 TIMES DAILY
Status: COMPLETED | OUTPATIENT
Start: 2018-06-27 | End: 2018-06-28

## 2018-06-27 RX ORDER — HYDROMORPHONE HYDROCHLORIDE 1 MG/ML
0.4 INJECTION, SOLUTION INTRAMUSCULAR; INTRAVENOUS; SUBCUTANEOUS EVERY 5 MIN PRN
Status: DISCONTINUED | OUTPATIENT
Start: 2018-06-27 | End: 2018-06-27 | Stop reason: HOSPADM

## 2018-06-27 RX ORDER — DOCUSATE SODIUM 100 MG/1
100 CAPSULE, LIQUID FILLED ORAL 2 TIMES DAILY
Status: DISCONTINUED | OUTPATIENT
Start: 2018-06-27 | End: 2018-06-29

## 2018-06-27 RX ORDER — MELATONIN
325
Status: DISCONTINUED | OUTPATIENT
Start: 2018-06-27 | End: 2018-06-29

## 2018-06-27 RX ORDER — OXYCODONE HYDROCHLORIDE 10 MG/1
10 TABLET ORAL EVERY 4 HOURS PRN
Status: DISPENSED | OUTPATIENT
Start: 2018-06-27 | End: 2018-06-29

## 2018-06-27 RX ORDER — ACETAMINOPHEN 325 MG/1
TABLET ORAL
Status: COMPLETED
Start: 2018-06-27 | End: 2018-06-27

## 2018-06-27 RX ORDER — AMLODIPINE BESYLATE 5 MG/1
5 TABLET ORAL DAILY
COMMUNITY
End: 2018-11-17

## 2018-06-27 RX ORDER — ONDANSETRON 2 MG/ML
4 INJECTION INTRAMUSCULAR; INTRAVENOUS EVERY 4 HOURS PRN
Status: ACTIVE | OUTPATIENT
Start: 2018-06-27 | End: 2018-06-29

## 2018-06-27 RX ORDER — ACETAMINOPHEN 500 MG
1000 TABLET ORAL ONCE
Status: ON HOLD | COMMUNITY
End: 2018-06-27

## 2018-06-27 RX ORDER — MIDAZOLAM HYDROCHLORIDE 1 MG/ML
1 INJECTION INTRAMUSCULAR; INTRAVENOUS EVERY 5 MIN PRN
Status: DISCONTINUED | OUTPATIENT
Start: 2018-06-27 | End: 2018-06-27 | Stop reason: HOSPADM

## 2018-06-27 RX ORDER — MAGNESIUM SULFATE HEPTAHYDRATE 40 MG/ML
2 INJECTION, SOLUTION INTRAVENOUS ONCE
Status: COMPLETED | OUTPATIENT
Start: 2018-06-27 | End: 2018-06-27

## 2018-06-27 RX ORDER — MEPERIDINE HYDROCHLORIDE 25 MG/ML
12.5 INJECTION INTRAMUSCULAR; INTRAVENOUS; SUBCUTANEOUS AS NEEDED
Status: DISCONTINUED | OUTPATIENT
Start: 2018-06-27 | End: 2018-06-27 | Stop reason: HOSPADM

## 2018-06-27 RX ORDER — DIPHENHYDRAMINE HYDROCHLORIDE 50 MG/ML
12.5 INJECTION INTRAMUSCULAR; INTRAVENOUS AS NEEDED
Status: DISCONTINUED | OUTPATIENT
Start: 2018-06-27 | End: 2018-06-27 | Stop reason: HOSPADM

## 2018-06-27 RX ORDER — HYDROMORPHONE HYDROCHLORIDE 1 MG/ML
INJECTION, SOLUTION INTRAMUSCULAR; INTRAVENOUS; SUBCUTANEOUS
Status: COMPLETED
Start: 2018-06-27 | End: 2018-06-27

## 2018-06-27 RX ORDER — CEFAZOLIN SODIUM/WATER 2 G/20 ML
2 SYRINGE (ML) INTRAVENOUS ONCE
Status: DISCONTINUED | OUTPATIENT
Start: 2018-06-27 | End: 2018-06-27 | Stop reason: HOSPADM

## 2018-06-27 RX ORDER — HYDROCODONE BITARTRATE AND ACETAMINOPHEN 10; 325 MG/1; MG/1
1-2 TABLET ORAL EVERY 4 HOURS PRN
Qty: 80 TABLET | Refills: 0 | Status: SHIPPED | OUTPATIENT
Start: 2018-06-27 | End: 2018-12-28 | Stop reason: ALTCHOICE

## 2018-06-27 RX ORDER — DIPHENHYDRAMINE HCL 25 MG
25 CAPSULE ORAL EVERY 4 HOURS PRN
Status: DISCONTINUED | OUTPATIENT
Start: 2018-06-27 | End: 2018-06-29

## 2018-06-27 RX ORDER — CEFAZOLIN SODIUM/WATER 2 G/20 ML
2 SYRINGE (ML) INTRAVENOUS EVERY 8 HOURS
Status: COMPLETED | OUTPATIENT
Start: 2018-06-27 | End: 2018-06-27

## 2018-06-27 RX ORDER — TIZANIDINE 2 MG/1
2 TABLET ORAL 3 TIMES DAILY PRN
Status: DISCONTINUED | OUTPATIENT
Start: 2018-06-27 | End: 2018-06-29

## 2018-06-27 RX ORDER — HYDROMORPHONE HYDROCHLORIDE 1 MG/ML
0.8 INJECTION, SOLUTION INTRAMUSCULAR; INTRAVENOUS; SUBCUTANEOUS EVERY 2 HOUR PRN
Status: ACTIVE | OUTPATIENT
Start: 2018-06-27 | End: 2018-06-29

## 2018-06-27 RX ORDER — KETOROLAC TROMETHAMINE 15 MG/ML
15 INJECTION, SOLUTION INTRAMUSCULAR; INTRAVENOUS EVERY 6 HOURS
Status: COMPLETED | OUTPATIENT
Start: 2018-06-27 | End: 2018-06-28

## 2018-06-27 RX ORDER — SODIUM PHOSPHATE, DIBASIC AND SODIUM PHOSPHATE, MONOBASIC 7; 19 G/133ML; G/133ML
1 ENEMA RECTAL ONCE AS NEEDED
Status: DISCONTINUED | OUTPATIENT
Start: 2018-06-27 | End: 2018-06-29

## 2018-06-27 RX ORDER — HYDROMORPHONE HYDROCHLORIDE 1 MG/ML
0.4 INJECTION, SOLUTION INTRAMUSCULAR; INTRAVENOUS; SUBCUTANEOUS EVERY 2 HOUR PRN
Status: ACTIVE | OUTPATIENT
Start: 2018-06-27 | End: 2018-06-29

## 2018-06-27 RX ORDER — BISACODYL 10 MG
10 SUPPOSITORY, RECTAL RECTAL
Status: DISCONTINUED | OUTPATIENT
Start: 2018-06-27 | End: 2018-06-29

## 2018-06-27 RX ORDER — OXYCODONE HYDROCHLORIDE 15 MG/1
15 TABLET ORAL EVERY 4 HOURS PRN
Status: ACTIVE | OUTPATIENT
Start: 2018-06-27 | End: 2018-06-29

## 2018-06-27 RX ORDER — DIPHENHYDRAMINE HYDROCHLORIDE 50 MG/ML
INJECTION INTRAMUSCULAR; INTRAVENOUS
Status: COMPLETED
Start: 2018-06-27 | End: 2018-06-27

## 2018-06-27 RX ORDER — OXYCODONE HYDROCHLORIDE 5 MG/1
5 TABLET ORAL EVERY 4 HOURS PRN
Status: DISPENSED | OUTPATIENT
Start: 2018-06-27 | End: 2018-06-29

## 2018-06-27 RX ORDER — LABETALOL HYDROCHLORIDE 5 MG/ML
INJECTION, SOLUTION INTRAVENOUS
Status: COMPLETED
Start: 2018-06-27 | End: 2018-06-27

## 2018-06-27 RX ORDER — DIPHENHYDRAMINE HYDROCHLORIDE 50 MG/ML
12.5 INJECTION INTRAMUSCULAR; INTRAVENOUS EVERY 4 HOURS PRN
Status: DISCONTINUED | OUTPATIENT
Start: 2018-06-27 | End: 2018-06-29

## 2018-06-27 RX ORDER — POLYETHYLENE GLYCOL 3350 17 G/17G
17 POWDER, FOR SOLUTION ORAL DAILY PRN
Status: DISCONTINUED | OUTPATIENT
Start: 2018-06-27 | End: 2018-06-29

## 2018-06-27 RX ORDER — MAGNESIUM SULFATE HEPTAHYDRATE 500 MG/ML
2 INJECTION, SOLUTION INTRAMUSCULAR; INTRAVENOUS ONCE
Status: DISCONTINUED | OUTPATIENT
Start: 2018-06-27 | End: 2018-06-27

## 2018-06-27 RX ORDER — SENNOSIDES 8.6 MG
17.2 TABLET ORAL NIGHTLY
Status: DISCONTINUED | OUTPATIENT
Start: 2018-06-27 | End: 2018-06-29

## 2018-06-27 RX ORDER — ACETAMINOPHEN 500 MG
1000 TABLET ORAL ONCE
Status: DISCONTINUED | OUTPATIENT
Start: 2018-06-27 | End: 2018-06-27 | Stop reason: HOSPADM

## 2018-06-27 RX ORDER — ONDANSETRON 2 MG/ML
4 INJECTION INTRAMUSCULAR; INTRAVENOUS AS NEEDED
Status: DISCONTINUED | OUTPATIENT
Start: 2018-06-27 | End: 2018-06-27 | Stop reason: HOSPADM

## 2018-06-27 RX ORDER — LABETALOL HYDROCHLORIDE 5 MG/ML
5 INJECTION, SOLUTION INTRAVENOUS EVERY 5 MIN PRN
Status: DISCONTINUED | OUTPATIENT
Start: 2018-06-27 | End: 2018-06-27 | Stop reason: HOSPADM

## 2018-06-27 NOTE — CM/SW NOTE
06/27/18 1500   CM/SW Screening   Referral Source    Information Source Chart review  (patient)   Patient's Mental Status Alert;Oriented   Patient's [address]   Number of Levels in Home 1   Patient lives with Spouse   Patient St

## 2018-06-27 NOTE — HOME CARE LIAISON
MET WITH PTNT TO DISCUSS HOME HEALTH SERVICES AND COVERAGE CRITERIA. PTNT AGREEABLE TO Duncan Bishop. PTNT GIVEN RESIDENTIAL BROCHURE. RESIDENTIAL WITH PROVIDE SN/PT ON DISCHARGE.     Thank you for this referral,   Verona Romberg

## 2018-06-27 NOTE — OPERATIVE REPORT
PREOPERATIVE DIAGNOSIS: Right knee osteoarthritis. POSTOPERATIVE DIAGNOSIS: Right knee osteoarthritis. PROCEDURE PERFORMED: Cemented right total knee arthroplasty. SURGEON:  George Cardenas M.D. FIRST ASSISTANT: Jaquan Franklin PA-C.    ANESTHESIA: Gene holes were made using the trial template. Final components the same size as the trials were utilized. Trial components were removed. Bony surfaces were irrigated and dried.  Final components were precoated with cement which had been mixed under vacuum co

## 2018-06-27 NOTE — PHYSICAL THERAPY NOTE
PHYSICAL THERAPY KNEE EVALUATION - INPATIENT     Room Number: 386/386-A  Evaluation Date: 6/27/2018  Type of Evaluation: Initial  Physician Order: PT Eval and Treat    Presenting Problem: S/p Cemented Right TKA on 06/27/18  Reason for Therapy: Mobility Dys SURGICAL HISTORY      Comment: R eye laser surgery-YAG Capsulotomy  2/6/15: OTHER SURGICAL HISTORY      Comment: L eye laser surgery-YAG Capsulotomy  1/5/16: OTHER SURGICAL HISTORY      Comment: L breast biopsy    HOME SITUATION  Type of Home: [place] air    AM-PAC '6-Clicks' INPATIENT SHORT FORM - BASIC MOBILITY  How much difficulty does the patient currently have. ..  -   Turning over in bed (including adjusting bedclothes, sheets and blankets)?: None   -   Sitting down on and standing up from a chair session/findings; All patient questions and concerns addressed;SCDs in place; Ice applied; Discussed recommendations with /    ASSESSMENT   Patient is a 72year old female admitted on 6/27/2018 for S/p Cemented Right TKA on 06/27/18. Patient is able to ambulate 300 feet with assistive device at assistance level:Supervision    Goal #4    Patient will negotiate 4 stairs/one curb w/ assistive device and supervision    Goal #5    AROM 0 degrees extension to 85 degrees flexion      Goal #6

## 2018-06-27 NOTE — RESPIRATORY THERAPY NOTE
Patient on MARY LOU protocol but does not wear cpap at home.  Will monitor patient at night with pulse ox

## 2018-06-27 NOTE — CONSULTS
[facility]  [address] Patient Status:  Inpatient    1945 MRN [id number]   [facility] 3SW-A Attending Vanda Wu MD   [facility] Day # 0 PCP Lincoln Seth DO     Reason for consult: Medical Mgt Colposcopy  12/11/12: OTHER SURGICAL HISTORY      Comment: R eye laser surgery-YAG Capsulotomy  2/6/15: OTHER SURGICAL HISTORY      Comment: L eye laser surgery-YAG Capsulotomy  1/5/16: OTHER SURGICAL HISTORY      Comment: L breast biopsy    Social History neurological deficits. CNII-XII grossly intact. Musculoskeletal: Moves all extremities. Extremities: No edema or cyanosis. Integument: No rashes or lesions. Psychiatric: Appropriate mood and affect.       Diagnostic Data:      Labs:  No results for inp

## 2018-06-27 NOTE — PROGRESS NOTES
Pt's PCP Dr Juanita Webber, EM[facility]. Dr Gallo Aleln paged with new consult. Spoke to Dr Gallo Allen. Per Her, she is not on call. Dr Sylvie Jones is. Dr Sylvie Jones paged with new consult.

## 2018-06-27 NOTE — PROGRESS NOTES
06/27/18 1746   Clinical Encounter Type   Visited With Patient and family together  ( at Bedside)   Routine Visit Introduction   Continue Visiting No   Patient's Supportive Strategies/Resources  provided emotional support, including activ

## 2018-06-27 NOTE — ANESTHESIA PREPROCEDURE EVALUATION
PRE-OP EVALUATION    Patient Name: Octavio Harris    Pre-op Diagnosis: Primary osteoarthritis of right knee [M17.11]    Procedure(s):  RIGHT TOTAL KNEE REPLACEMENT    Surgeon(s) and Role:     Juan Antonio Adkins MD - Primary    Pre-op vitals revie Surgical History:  10/14/08: CATARACT      Comment: Right eye  10/28/08: CATARACT      Comment: Left eye  2016: COLONOSCOPY  2/11/93: FRACTURE SURGERY      Comment: Fractured L ankle.  1 plate and 8 scres  6/7/02: LUMPECTOMY LEFT      Comment: L breast lump discussed. All questions answered    Spinal refused.  Discussed adductor canal block and IPACK block, risks, benefits and expectations discussed  Plan/risks discussed with: patient and spouse                Present on Admission:  **None**

## 2018-06-27 NOTE — ANESTHESIA POSTPROCEDURE EVALUATION
[facility] Patient Status:  Surgery Admit   Age/Gender 72year old female MRN [id number]   [facility] SURGERY Attending Darcie Canas MD   [facility] Day # 0 PCP Pinky Medina DO       Anesthesia Post-op Note

## 2018-06-28 ENCOUNTER — APPOINTMENT (OUTPATIENT)
Dept: CV DIAGNOSTICS | Facility: HOSPITAL | Age: 73
DRG: 470 | End: 2018-06-28
Attending: STUDENT IN AN ORGANIZED HEALTH CARE EDUCATION/TRAINING PROGRAM
Payer: MEDICARE

## 2018-06-28 LAB
ATRIAL RATE: 166 BPM
BASOPHILS # BLD AUTO: 0.02 X10(3) UL (ref 0–0.1)
BASOPHILS NFR BLD AUTO: 0.2 %
BUN BLD-MCNC: 22 MG/DL (ref 8–20)
CALCIUM BLD-MCNC: 8.9 MG/DL (ref 8.3–10.3)
CHLORIDE: 108 MMOL/L (ref 101–111)
CO2: 25 MMOL/L (ref 22–32)
CREAT BLD-MCNC: 0.7 MG/DL (ref 0.55–1.02)
EOSINOPHIL # BLD AUTO: 0.01 X10(3) UL (ref 0–0.3)
EOSINOPHIL NFR BLD AUTO: 0.1 %
ERYTHROCYTE [DISTWIDTH] IN BLOOD BY AUTOMATED COUNT: 13.6 % (ref 11.5–16)
ERYTHROCYTE [DISTWIDTH] IN BLOOD BY AUTOMATED COUNT: 13.6 % (ref 11.5–16)
GLUCOSE BLD-MCNC: 106 MG/DL (ref 70–99)
HCT VFR BLD AUTO: 31.7 % (ref 34–50)
HCT VFR BLD AUTO: 31.7 % (ref 34–50)
HGB BLD-MCNC: 10.5 G/DL (ref 12–16)
HGB BLD-MCNC: 10.5 G/DL (ref 12–16)
IMMATURE GRANULOCYTE COUNT: 0.04 X10(3) UL (ref 0–1)
IMMATURE GRANULOCYTE RATIO %: 0.4 %
LYMPHOCYTES # BLD AUTO: 2.08 X10(3) UL (ref 0.9–4)
LYMPHOCYTES NFR BLD AUTO: 21 %
MCH RBC QN AUTO: 30.8 PG (ref 27–33.2)
MCH RBC QN AUTO: 30.8 PG (ref 27–33.2)
MCHC RBC AUTO-ENTMCNC: 33.1 G/DL (ref 31–37)
MCHC RBC AUTO-ENTMCNC: 33.1 G/DL (ref 31–37)
MCV RBC AUTO: 93 FL (ref 81–100)
MCV RBC AUTO: 93 FL (ref 81–100)
MONOCYTES # BLD AUTO: 1.14 X10(3) UL (ref 0.1–1)
MONOCYTES NFR BLD AUTO: 11.5 %
NEUTROPHIL ABS PRELIM: 6.6 X10 (3) UL (ref 1.3–6.7)
NEUTROPHILS # BLD AUTO: 6.6 X10(3) UL (ref 1.3–6.7)
NEUTROPHILS NFR BLD AUTO: 66.8 %
PLATELET # BLD AUTO: 234 10(3)UL (ref 150–450)
PLATELET # BLD AUTO: 234 10(3)UL (ref 150–450)
POTASSIUM SERPL-SCNC: 4.5 MMOL/L (ref 3.6–5.1)
Q-T INTERVAL: 396 MS
QRS DURATION: 160 MS
QTC CALCULATION (BEZET): 518 MS
R AXIS: -12 DEGREES
RBC # BLD AUTO: 3.41 X10(6)UL (ref 3.8–5.1)
RBC # BLD AUTO: 3.41 X10(6)UL (ref 3.8–5.1)
RED CELL DISTRIBUTION WIDTH-SD: 46.5 FL (ref 35.1–46.3)
RED CELL DISTRIBUTION WIDTH-SD: 46.5 FL (ref 35.1–46.3)
SODIUM SERPL-SCNC: 139 MMOL/L (ref 136–144)
T AXIS: 143 DEGREES
TSI SER-ACNC: 0.33 MIU/ML (ref 0.35–5.5)
VENTRICULAR RATE: 103 BPM
WBC # BLD AUTO: 9.9 X10(3) UL (ref 4–13)
WBC # BLD AUTO: 9.9 X10(3) UL (ref 4–13)

## 2018-06-28 PROCEDURE — 93306 TTE W/DOPPLER COMPLETE: CPT | Performed by: STUDENT IN AN ORGANIZED HEALTH CARE EDUCATION/TRAINING PROGRAM

## 2018-06-28 PROCEDURE — 99232 SBSQ HOSP IP/OBS MODERATE 35: CPT | Performed by: STUDENT IN AN ORGANIZED HEALTH CARE EDUCATION/TRAINING PROGRAM

## 2018-06-28 RX ORDER — HYDROCODONE BITARTRATE AND ACETAMINOPHEN 10; 325 MG/1; MG/1
1 TABLET ORAL EVERY 4 HOURS PRN
Status: DISCONTINUED | OUTPATIENT
Start: 2018-06-29 | End: 2018-06-29

## 2018-06-28 RX ORDER — HYDROCODONE BITARTRATE AND ACETAMINOPHEN 10; 325 MG/1; MG/1
2 TABLET ORAL EVERY 4 HOURS PRN
Status: DISCONTINUED | OUTPATIENT
Start: 2018-06-29 | End: 2018-06-29

## 2018-06-28 NOTE — CONSULTS
[facility]  Report of Consultation    Nieves Perez Patient Status:  Inpatient    1945 MRN [id number]   [facility] 3SW-A Attending Sarkis Watson MD   [facility] Day # 1 PCP Lizet Moon, 05/16/2017   • Pulmonary embolism ([address] 09/19/2003    Occurred after a long plane ride ON HRT   • Visual impairment     READERS     Past Surgical History:  10/14/08: CATARACT      Comment: Right eye  10/28/08: CATARACT      Comment: Left eye  2016: Shelia Irwin 400 MG/5ML suspension 30 mL, 30 mL, Oral, Daily PRN  •  bisacodyl (DULCOLAX) rectal suppository 10 mg, 10 mg, Rectal, Daily PRN  •  FLEET ENEMA (FLEET) 7-19 GM/118ML enema 133 mL, 1 enema, Rectal, Once PRN  •  ondansetron HCl (ZOFRAN) injection 4 mg, 4 mg, kg)  06/20/18 : 232 lb 3.2 oz (105.3 kg)  06/14/18 : 213 lb 6 oz (96.8 kg)      Telemetry: afib  General: Alert and oriented in no apparent distress. HEENT: No focal deficits. Neck: No JVD, carotids 2+ no bruits.   Cardiac: Regular rate and rhythm, S1, S2

## 2018-06-28 NOTE — PROGRESS NOTES
Patient and  () attended group discharge education class. Discharge education provided utilizing \"hip/knee replacement discharge instructions\" sheet. Teach back done. Questions solicited and answered. Tolerated activity well.

## 2018-06-28 NOTE — OCCUPATIONAL THERAPY NOTE
OCCUPATIONAL THERAPY QUICK EVALUATION - INPATIENT    Room Number: 386/386-A  Evaluation Date: 6/28/2018     Type of Evaluation: Quick Eval  Presenting Problem: s/p R TKA 6/27/18    Physician Order: IP Consult to Occupational Therapy  Reason for Therapy:  A teeth  3/13/09: OTHER SURGICAL HISTORY      Comment: Kidney stones removed  1/20/10: OTHER SURGICAL HISTORY      Comment: Colposcopy  12/11/12: OTHER SURGICAL HISTORY      Comment: R eye laser surgery-YAG Capsulotomy  2/6/15: OTHER SURGICAL HISTORY      Co care of personal grooming such as brushing teeth?: None  -   Eating meals?: None    AM-PAC Score:  Score: 21  Approx Degree of Impairment: 32.79%  Standardized Score (AM-PAC Scale): 44.27  CMS Modifier (G-Code): CJ    FUNCTIONAL TRANSFER ASSESSMENT  Supine Patient Complexity  Occupational Profile/Medical History  LOW - Brief history including review of medical or therapy records    Specific performance deficits impacting engagement in ADL/IADL  LOW  1 - 3 performance deficits    Client Assessment/Perform

## 2018-06-28 NOTE — PAYOR COMM NOTE
--------------  ADMISSION REVIEW       6/27    DIRECT FOR OR     Orthopedics  Operative Report Date of Service: 6/27/2018  8:55 AM         []    PREOPERATIVE DIAGNOSIS: Right knee osteoarthritis. POSTOPERATIVE DIAGNOSIS: Right knee osteoarthritis.    PROC

## 2018-06-28 NOTE — PROGRESS NOTES
[address] [place] [address] Patient Status:  Inpatient    1945 MRN [id number]   [facility] 3SW-A Attending Vandana Stafford MD   [facility] Day # 1 PCP Beronica Callahan DO     Subjective:  S/P RIGHT Total Knee Arthroplasty

## 2018-06-28 NOTE — PROGRESS NOTES
[facility]  Progress Note     Romero Fresh Patient Status:  Inpatient    1945 MRN [id number]   [facility] 3SW-A Attending Darcie Canas MD   [facility] Day # 1 PCP Marisela Santiago DO     Chief Complaint: Medical Mgt acetaminophen  650 mg Oral QID       ASSESSMENT / PLAN:     1. New onset afib  1. Echo  2. TSH  3. Cards on Cs  2. OA of the right knee s/p arthoplasty   3.  Hypertension    Plan of care:   BB started for afib, ortho cleared to increased eliquis to 5 bid

## 2018-06-28 NOTE — PHYSICAL THERAPY NOTE
PHYSICAL THERAPY KNEE TREATMENT NOTE - INPATIENT     Room Number: 386/386-A     Session: 1&2   Number of Visits to Meet Established Goals: 3    Presenting Problem: S/p Cemented Right TKA on 06/27/18    Problem List  Active Problems:    * No active hospita breast biopsy    SUBJECTIVE  \" My knee doesn't hurt too bad. \"    Patient’s self-stated goal is To walk.     OBJECTIVE  Precautions: Limb alert - left (Surgical)    WEIGHT BEARING STATUS  Weight Bearing Restriction: R lower extremity        R Lower Extremi decreased stance time on RLE and dec step through. Cues provided to improve heel to toe and step through pattern to promote normal gait pattern. PM: Pt attended PM group session for mobility training and exercises.  Pt shows improved tolerance to incr Mobility Short Form was completed and this patient is demonstrating a 11.2% degree of impairment in mobility. Research supports that patients with this level of impairment may benefit from [address]           DISCHARGE RECOMMENDATIONS  PT Discharge Recommendati

## 2018-06-28 NOTE — PROGRESS NOTES
Post Op Day 1 Ortho Note    Attempted to see pt three times but either pt not in room or having ECHO/tests. Spoke with RN who states pt able to lift leg and perform plantar flexion and dorsiflexion; no apparent complications from PNB.  Tolerating medicat

## 2018-06-28 NOTE — PLAN OF CARE
Spoke with Dr. Chauhan  regarding patient currently in afib and anticoagulation.  Ok to increase Eliqius to 5mg BID tomorrow AM.

## 2018-06-29 ENCOUNTER — PRIOR ORIGINAL RECORDS (OUTPATIENT)
Dept: OTHER | Age: 73
End: 2018-06-29

## 2018-06-29 VITALS
SYSTOLIC BLOOD PRESSURE: 110 MMHG | RESPIRATION RATE: 18 BRPM | WEIGHT: 228.19 LBS | HEART RATE: 97 BPM | BODY MASS INDEX: 37.56 KG/M2 | TEMPERATURE: 99 F | OXYGEN SATURATION: 97 % | HEIGHT: 65.25 IN | DIASTOLIC BLOOD PRESSURE: 59 MMHG

## 2018-06-29 LAB
ERYTHROCYTE [DISTWIDTH] IN BLOOD BY AUTOMATED COUNT: 14 % (ref 11.5–16)
FREE T4: 0.8 NG/DL (ref 0.9–1.8)
HCT VFR BLD AUTO: 32.4 % (ref 34–50)
HGB BLD-MCNC: 10.7 G/DL (ref 12–16)
MCH RBC QN AUTO: 30.8 PG (ref 27–33.2)
MCHC RBC AUTO-ENTMCNC: 33 G/DL (ref 31–37)
MCV RBC AUTO: 93.4 FL (ref 81–100)
PLATELET # BLD AUTO: 224 10(3)UL (ref 150–450)
RBC # BLD AUTO: 3.47 X10(6)UL (ref 3.8–5.1)
RED CELL DISTRIBUTION WIDTH-SD: 47.3 FL (ref 35.1–46.3)
WBC # BLD AUTO: 8.8 X10(3) UL (ref 4–13)

## 2018-06-29 PROCEDURE — 99232 SBSQ HOSP IP/OBS MODERATE 35: CPT | Performed by: STUDENT IN AN ORGANIZED HEALTH CARE EDUCATION/TRAINING PROGRAM

## 2018-06-29 RX ORDER — PSEUDOEPHEDRINE HCL 30 MG
100 TABLET ORAL 2 TIMES DAILY
Qty: 60 CAPSULE | Refills: 0 | Status: SHIPPED | OUTPATIENT
Start: 2018-06-29 | End: 2018-12-28 | Stop reason: ALTCHOICE

## 2018-06-29 RX ORDER — METOPROLOL SUCCINATE 50 MG/1
50 TABLET, EXTENDED RELEASE ORAL DAILY
Qty: 30 TABLET | Refills: 3 | Status: SHIPPED | OUTPATIENT
Start: 2018-06-29 | End: 2020-06-02 | Stop reason: ALTCHOICE

## 2018-06-29 NOTE — CONSULTS
[facility]  Report of Consultation    Yeimi Carnes Patient Status:  Inpatient    1945 MRN [id number]   [facility] 3SW-A Attending Judd Brunner, MD   [facility] Day # 2 PCP Wendel Alpers, DO     Reason for Consultation: Comment: L breast lumpectomy w/sentinel lymph node                biopsy and lymph node dissection  11/19/08: OTHER SURGICAL HISTORY      Comment: Oral surgery-2 cyst and 2 wisdom teeth  3/13/09: OTHER SURGICAL HISTORY      Comment: Kidney stones removed DiphenhydrAMINE HCl (BENADRYL) injection 12.5 mg, 12.5 mg, Intravenous, Q4H PRN  •  lactated ringers infusion, , Intravenous, Continuous  •  Zolpidem Tartrate (AMBIEN) tab 5 mg, 5 mg, Oral, Nightly PRN  •  TiZANidine HCl (ZANAFLEX) tab 2 mg, 2 mg, Oral, TI arthtroplasty. New onset afib- unclear etiology. Currently rate controlled on Eliquis. Continues management per Cardiology. Will sign off at this time. Please call with any questions. Plan of care discussed with patient/family at bedside.   All que

## 2018-06-29 NOTE — DISCHARGE SUMMARY
[facility]  Discharge Summary    Valery Holm Patient Status:  Inpatient    1945 MRN [id number]   [facility] 3SW-A Attending No att. providers found   [facility] Day # 2 PCP Jason Hernández DO     Date of Admission: 2018 Tyree Johnson Tabs  Commonly known as:  MOTRIN           Where to Get Your Medications      These medications were sent to [address] [place], [place] [address]y [phone], [phone] [address], [address] [place] [address]    Phone:  [phone]

## 2018-06-29 NOTE — PROGRESS NOTES
[facility]  Progress Note     Valery Betitim Patient Status:  Inpatient    1945 MRN [id number]   [facility] 3SW-A Attending Ana M Izquierdo MD   [facility] Day # 2 PCP Jason Hernández DO     Chief Complaint: Medical Mgt    S: of the right knee s/p arthoplasty   3. Hypertension  4. Abnormal TFT    Plan of care: Abnormal TFT- both TSH, Free T 4 - however marginally low.  Will repeat in 4 weeks an f/u with endo  Tolerated BB- continue on d/c     Quality:  · DVT Prophylaxis: Nydiaqu

## 2018-06-29 NOTE — PROGRESS NOTES
[facility]  Cardiology Progress Note    Isabela Bell Patient Status:  Inpatient    1945 MRN [id number]   [facility] 3SW-A Attending Oral Bella MD   [facility] Day # 2 PCP Viri Kaur DO     Subjective:  Feeling well, distress. HEENT: No focal deficits. Neck: No JVD   Cardiac: irregular rate and rhythm, S1, S2 normal   Lungs: Clear    Abdomen: Soft, non-tender. Extremities: No edema   Skin: Warm and dry.      Medications:  • metoprolol tartrate  25 mg Oral 2x Daily(B

## 2018-06-29 NOTE — PROGRESS NOTES
NURSING DISCHARGE NOTE    Discharged to home with [facility]. Accompanied by her . Belongings packed and sent home with patient. Prescription for Norco given to patient.

## 2018-06-29 NOTE — PROGRESS NOTES
[facility] Patient Status:  Inpatient    1945 MRN [id number]   [facility] 3SW-A Attending Flex Plata MD   [address] Day # 2 PCP Renea Doherty DO     Subjective:  S/P RIGHT Total Knee Arthroplasty

## 2018-06-29 NOTE — PLAN OF CARE
PAIN - ADULT    • Verbalizes/displays adequate comfort level or patient's stated pain goal Progressing        Patient verbalized pain to right knee surgical site well controlled with oral pain medication.   Aquacel dressing to right knee in place with small

## 2018-06-29 NOTE — PROGRESS NOTES
Acute Pain Service    Post Op Day 2 Ortho Note    Assessed patient in gym doing stairs. Patient states Josph Hopping is working well to manage pain; denies itching/nausea/dizziness. Patient still in Afib but rate controlled.     Patient able to bear weight on sx l

## 2018-06-29 NOTE — PHYSICAL THERAPY NOTE
PHYSICAL THERAPY KNEE TREATMENT NOTE - INPATIENT     Room Number: 386/386-A     Session: 3   Number of Visits to Meet Established Goals: 3    Presenting Problem: S/p Cemented Right TKA on 06/27/18    Problem List  Active Problems:    Atrial fibrillation ( biopsy    SUBJECTIVE  \" My knee hurts today. \"    Patient’s self-stated goal is To walk.     OBJECTIVE  Precautions: Limb alert - left (Surgical)    WEIGHT BEARING STATUS  Weight Bearing Restriction: R lower extremity        R Lower Extremity: Weight Graciela Cuellar training with use of one rail and st cane with sup via step to pattern. Pt was trained on tub bench transfer to simulate car transfer technique with sup. Family present.      Exercises AM Session   Ankle Pumps 20 reps   Quad Sets 20 reps   Glut Sets 20 reps EOB @ level: supervision    Goal #2     Patient is able to demonstrate transfers Sit to/from Stand at assistance level: supervison  met    Goal #3     Patient is able to ambulate 300 feet with assistive device at assistance level:Supervision  Met 6/28/18

## 2018-06-30 NOTE — CM/SW NOTE
06/30/18 1000   Discharge disposition   Expected discharge disposition Home-Health   Name of Facillity/Home Care/Hospice [facility]idential   Discharge transportation Private car

## 2018-07-03 ENCOUNTER — PATIENT OUTREACH (OUTPATIENT)
Dept: CASE MANAGEMENT | Age: 73
End: 2018-07-03

## 2018-07-03 ENCOUNTER — PATIENT MESSAGE (OUTPATIENT)
Dept: CASE MANAGEMENT | Age: 73
End: 2018-07-03

## 2018-07-05 ENCOUNTER — OFFICE VISIT (OUTPATIENT)
Dept: FAMILY MEDICINE CLINIC | Facility: CLINIC | Age: 73
End: 2018-07-05

## 2018-07-05 VITALS
OXYGEN SATURATION: 98 % | DIASTOLIC BLOOD PRESSURE: 80 MMHG | HEART RATE: 60 BPM | SYSTOLIC BLOOD PRESSURE: 122 MMHG | WEIGHT: 230 LBS | TEMPERATURE: 98 F | BODY MASS INDEX: 38 KG/M2

## 2018-07-05 DIAGNOSIS — I48.91 NEW ONSET ATRIAL FIBRILLATION (HCC): Primary | ICD-10-CM

## 2018-07-05 PROCEDURE — 99214 OFFICE O/P EST MOD 30 MIN: CPT | Performed by: FAMILY MEDICINE

## 2018-07-09 PROBLEM — M17.11 PRIMARY OSTEOARTHRITIS OF RIGHT KNEE: Status: ACTIVE | Noted: 2017-05-16

## 2018-07-09 PROBLEM — Z96.651 STATUS POST TOTAL RIGHT KNEE REPLACEMENT: Status: ACTIVE | Noted: 2018-07-09

## 2018-07-10 ENCOUNTER — HOSPITAL ENCOUNTER (OUTPATIENT)
Dept: GENERAL RADIOLOGY | Age: 73
Discharge: HOME | End: 2018-07-10
Attending: ORTHOPAEDIC SURGERY
Payer: MEDICARE

## 2018-07-10 DIAGNOSIS — M17.11 PRIMARY OSTEOARTHRITIS OF RIGHT KNEE: ICD-10-CM

## 2018-07-10 DIAGNOSIS — Z13.6 SCREENING FOR CARDIOVASCULAR CONDITION: ICD-10-CM

## 2018-07-10 DIAGNOSIS — Z96.651 STATUS POST TOTAL RIGHT KNEE REPLACEMENT: ICD-10-CM

## 2018-07-10 PROCEDURE — 73560 X-RAY EXAM OF KNEE 1 OR 2: CPT | Performed by: ORTHOPAEDIC SURGERY

## 2018-07-12 ENCOUNTER — TELEPHONE (OUTPATIENT)
Dept: FAMILY MEDICINE CLINIC | Facility: CLINIC | Age: 73
End: 2018-07-12

## 2018-07-12 NOTE — TELEPHONE ENCOUNTER
----- Message from Helio Evans RN sent at 7/12/2018 11:04 AM CDT -----  OK to schedule second Shingrix. Have doctor place order if patient schedules.

## 2018-07-16 ENCOUNTER — OFFICE VISIT (OUTPATIENT)
Dept: PHYSICAL THERAPY | Age: 73
End: 2018-07-16
Attending: ORTHOPAEDIC SURGERY
Payer: MEDICARE

## 2018-07-16 DIAGNOSIS — Z96.651 STATUS POST TOTAL RIGHT KNEE REPLACEMENT: ICD-10-CM

## 2018-07-16 DIAGNOSIS — M17.11 PRIMARY OSTEOARTHRITIS OF RIGHT KNEE: ICD-10-CM

## 2018-07-16 DIAGNOSIS — Z13.6 SCREENING FOR CARDIOVASCULAR CONDITION: ICD-10-CM

## 2018-07-16 PROCEDURE — 97116 GAIT TRAINING THERAPY: CPT

## 2018-07-16 PROCEDURE — 97162 PT EVAL MOD COMPLEX 30 MIN: CPT

## 2018-07-16 PROCEDURE — 97110 THERAPEUTIC EXERCISES: CPT

## 2018-07-16 NOTE — PROGRESS NOTES
INITIAL EVALUATION:   Referring Physician: Dr. Lyn Browning  Diagnosis:    -Status post total right knee replacement   -Primary osteoarthritis of right knee  Date of Service: 7/16/2018     PATIENT Angi Rush is a 72year old female; past m right LE with transfer; there is decrease knee extension at mid-terminal stance phase of gait; with decreased step length; pain level reported 2/10.    -30 second sit/stand test: 5 repetitions  -Timed up and go:  15 seconds  -Stairs - step to step pattern within 12 visits. Precautions:  None; cardiovascular risk factors/history      PLAN OF CARE:    Goals:    Short term goals (6 visits)  -Demonstrate SLR to at least 15 reps with extensor lag.   -Demonstrate ability to ambulate 1000 feet and ascend/descend PT    [de-identified] certification required: Yes  I certify the need for these services furnished under this plan of treatment and while under my care.     X___________________________________________________ Date____________________    Certification From: 7/1

## 2018-07-17 ENCOUNTER — PRIOR ORIGINAL RECORDS (OUTPATIENT)
Dept: OTHER | Age: 73
End: 2018-07-17

## 2018-07-18 ENCOUNTER — OFFICE VISIT (OUTPATIENT)
Dept: PHYSICAL THERAPY | Age: 73
End: 2018-07-18
Attending: ORTHOPAEDIC SURGERY
Payer: MEDICARE

## 2018-07-18 DIAGNOSIS — Z13.6 SCREENING FOR CARDIOVASCULAR CONDITION: ICD-10-CM

## 2018-07-18 DIAGNOSIS — M17.11 PRIMARY OSTEOARTHRITIS OF RIGHT KNEE: ICD-10-CM

## 2018-07-18 DIAGNOSIS — Z96.651 STATUS POST TOTAL RIGHT KNEE REPLACEMENT: ICD-10-CM

## 2018-07-18 PROCEDURE — 97530 THERAPEUTIC ACTIVITIES: CPT

## 2018-07-18 PROCEDURE — 97110 THERAPEUTIC EXERCISES: CPT

## 2018-07-18 PROCEDURE — 97014 ELECTRIC STIMULATION THERAPY: CPT

## 2018-07-18 PROCEDURE — 97140 MANUAL THERAPY 1/> REGIONS: CPT

## 2018-07-18 NOTE — PROGRESS NOTES
Dx:      -Status post total right knee replacement  -Primary osteoarthritis of right knee   -Screening for cardiovascular condition    -Eval and treat right knee WBAT  -S/p TKR sx 6-27-18    Authorized # of Visits:  No prior authorization          Next MD

## 2018-07-20 ENCOUNTER — MED REC SCAN ONLY (OUTPATIENT)
Dept: FAMILY MEDICINE CLINIC | Facility: CLINIC | Age: 73
End: 2018-07-20

## 2018-07-23 ENCOUNTER — OFFICE VISIT (OUTPATIENT)
Dept: PHYSICAL THERAPY | Age: 73
End: 2018-07-23
Attending: ORTHOPAEDIC SURGERY
Payer: MEDICARE

## 2018-07-23 DIAGNOSIS — Z96.651 STATUS POST TOTAL RIGHT KNEE REPLACEMENT: ICD-10-CM

## 2018-07-23 DIAGNOSIS — Z13.6 SCREENING FOR CARDIOVASCULAR CONDITION: ICD-10-CM

## 2018-07-23 DIAGNOSIS — M17.11 PRIMARY OSTEOARTHRITIS OF RIGHT KNEE: ICD-10-CM

## 2018-07-23 PROCEDURE — 97530 THERAPEUTIC ACTIVITIES: CPT

## 2018-07-23 PROCEDURE — 97110 THERAPEUTIC EXERCISES: CPT

## 2018-07-23 PROCEDURE — 97014 ELECTRIC STIMULATION THERAPY: CPT

## 2018-07-23 NOTE — PROGRESS NOTES
Dx:      -Status post total right knee replacement  -Primary osteoarthritis of right knee   -Screening for cardiovascular condition    -Eval and treat right knee WBAT  -S/p TKR sx 6-27-18    Authorized # of Visits:  No prior authorization          Next MD extension 5 second hold 12 reps x 3 sets   -Reviewed LE position and knee position with sit/stand transition with progression into functional sit/stand stand squat 12 reps x 3 -Reviewed LE position and knee position with sit/stand transition with progressi

## 2018-07-26 ENCOUNTER — OFFICE VISIT (OUTPATIENT)
Dept: PHYSICAL THERAPY | Age: 73
End: 2018-07-26
Attending: ORTHOPAEDIC SURGERY
Payer: MEDICARE

## 2018-07-26 ENCOUNTER — MED REC SCAN ONLY (OUTPATIENT)
Dept: FAMILY MEDICINE CLINIC | Facility: CLINIC | Age: 73
End: 2018-07-26

## 2018-07-26 DIAGNOSIS — M17.11 PRIMARY OSTEOARTHRITIS OF RIGHT KNEE: ICD-10-CM

## 2018-07-26 DIAGNOSIS — Z13.6 SCREENING FOR CARDIOVASCULAR CONDITION: ICD-10-CM

## 2018-07-26 DIAGNOSIS — Z96.651 STATUS POST TOTAL RIGHT KNEE REPLACEMENT: ICD-10-CM

## 2018-07-26 PROCEDURE — 97110 THERAPEUTIC EXERCISES: CPT

## 2018-07-26 PROCEDURE — 97116 GAIT TRAINING THERAPY: CPT

## 2018-07-26 PROCEDURE — 97530 THERAPEUTIC ACTIVITIES: CPT

## 2018-07-26 PROCEDURE — 97014 ELECTRIC STIMULATION THERAPY: CPT

## 2018-07-26 NOTE — PROGRESS NOTES
Dx:      -Status post total right knee replacement  -Primary osteoarthritis of right knee   -Screening for cardiovascular condition    -Eval and treat right knee WBAT  -S/p TKR sx 6-27-18    Authorized # of Visits:  No prior authorization          Next MD repetitions  -Gluteus medius at least 4/5  -Ability to perform to household chores activities with minimal difficulty (Based upon patient specific functional scale rating of at least 8/10    Plan: Continue with use of recumbent bike and standing activities terminal knee extension -Reviewed supine quad set with terminal knee extension -Reviewed supine quad set with terminal knee extension   -Supine SLR 8 reps x 3 sets -Supine SLR 8 reps x 3 sets Supine SLR 8 reps x 3 sets   -NMES R quad max tolerated contract

## 2018-07-30 ENCOUNTER — OFFICE VISIT (OUTPATIENT)
Dept: PHYSICAL THERAPY | Age: 73
End: 2018-07-30
Attending: ORTHOPAEDIC SURGERY
Payer: MEDICARE

## 2018-07-30 PROCEDURE — 97110 THERAPEUTIC EXERCISES: CPT

## 2018-07-30 PROCEDURE — 97530 THERAPEUTIC ACTIVITIES: CPT

## 2018-07-30 PROCEDURE — 97112 NEUROMUSCULAR REEDUCATION: CPT

## 2018-07-30 NOTE — PROGRESS NOTES
Dx:      -Status post total right knee replacement  -Primary osteoarthritis of right knee   -Screening for cardiovascular condition    -Eval and treat right knee WBAT  -S/p TKR sx 6-27-18    Authorized # of Visits:  No prior authorization          Next MD flexion 120 degrees    Long term goals (12 visits)  -Ambulate @ least 15 minutes without use of assistive device and minimal gait deviation with minimal (>2/10) to no knee pain  -Demonstrate ability to ascend/descend stairs step over step fashion with unil sitting knee extension 5 second hold 12 reps x 3 sets -Standing lower march to 8 inch step 20 reps x 3 sets -Standing lower march to 8 inch step 15 reps x 3 sets, R/L progressed to independent   -Reviewed LE position and knee position with sit/stand transi

## 2018-07-31 LAB
BUN: 22 MG/DL
CALCIUM: 8.9 MG/DL
CHLORIDE: 108 MEQ/L
CREATININE, SERUM: 0.7 MG/DL
FREE T4: 0.8 MG/DL
GLUCOSE: 106 MG/DL
HEMATOCRIT: 32.4 %
HEMOGLOBIN: 10.7 G/DL
PLATELETS: 224 K/UL
POTASSIUM, SERUM: 4.5 MEQ/L
RED BLOOD COUNT: 3.47 X 10-6/U
SODIUM: 139 MEQ/L
THYROID STIMULATING HORMONE: 0.33 MLU/L
WHITE BLOOD COUNT: 8.8 X 10-3/U

## 2018-08-01 ENCOUNTER — OFFICE VISIT (OUTPATIENT)
Dept: PHYSICAL THERAPY | Age: 73
End: 2018-08-01
Attending: ORTHOPAEDIC SURGERY
Payer: MEDICARE

## 2018-08-01 PROCEDURE — 97014 ELECTRIC STIMULATION THERAPY: CPT

## 2018-08-01 PROCEDURE — 97530 THERAPEUTIC ACTIVITIES: CPT

## 2018-08-01 PROCEDURE — 97110 THERAPEUTIC EXERCISES: CPT

## 2018-08-01 PROCEDURE — 97116 GAIT TRAINING THERAPY: CPT

## 2018-08-01 NOTE — PROGRESS NOTES
Dx:      -Status post total right knee replacement  -Primary osteoarthritis of right knee   -Screening for cardiovascular condition    -Eval and treat right knee WBAT  -S/p TKR sx 6-27-18    Authorized # of Visits:  No prior authorization          Next MD lag.  -Demonstrate ability to ambulate 1000 feet and ascend/descend in step over step fashion stairs without single point cane without gait deviation.  -Demonstrated knee extension 0 degrees; knee flexion 120 degrees    Long term goals (12 visits)  -Ambula and lower extremity position; practice with various speed -Gait training ind; cueing for step length and lower extremity position; practice with various speed Gait training ind; cueing for step length and lower extremity position; practice with high knees, contraction 10 second on/30 second off (Ecuadorean) x 15 minutes* see above note   -Reviewed home program and activity resumption based on patient quesitons -Reviewed home program - encouraged adherence -Encourage home program (seated AROM; quad setting, supi

## 2018-08-06 ENCOUNTER — OFFICE VISIT (OUTPATIENT)
Dept: PHYSICAL THERAPY | Age: 73
End: 2018-08-06
Attending: ORTHOPAEDIC SURGERY
Payer: MEDICARE

## 2018-08-06 PROCEDURE — 97110 THERAPEUTIC EXERCISES: CPT

## 2018-08-06 PROCEDURE — 97014 ELECTRIC STIMULATION THERAPY: CPT

## 2018-08-06 PROCEDURE — 97530 THERAPEUTIC ACTIVITIES: CPT

## 2018-08-06 NOTE — PROGRESS NOTES
Dx:      -Status post total right knee replacement  -Primary osteoarthritis of right knee   -Screening for cardiovascular condition    -Eval and treat right knee WBAT  -S/p TKR sx 6-27-18    Authorized # of Visits:  No prior authorization          Next MD degrees  Active knee flexion: 110 -Active knee extension: -5 degrees  -Active knee flexion: 95 degrees     Muscle strength:   8/6/2018 Initial Evaluation   -SLR x 15 without extensor lag  -Hip abduction: 3+/5  -Knee Flexion R LE: 4/5  -Knee Extension R LE: at least 4/5  -Ability to perform to household chores activities with minimal difficulty (Based upon patient specific functional scale rating of at least 8/10    Rehab Potential: excellent    Plan: Continue skilled Physical Therapy 2 x/week reducing to 1x/ training ind; cueing for step length and lower extremity position; practice with high knees, lateral stepping, large steps, stepping over small step/ giovanny Reassessment x 15 min; see above objective   -Reviewed sitting knee extension 5 second hold 12 reps quad max tolerated contraction 10 second on/30 second off (Sri Lankan) x 15 minutes* see above note -NMES R quad max tolerated contraction 10 second on/30 second off (Sri Lankan) x 15 minutes  In Long sitting with LAQ during activation   -Reviewed home program a

## 2018-08-08 ENCOUNTER — OFFICE VISIT (OUTPATIENT)
Dept: PHYSICAL THERAPY | Age: 73
End: 2018-08-08
Attending: ORTHOPAEDIC SURGERY
Payer: MEDICARE

## 2018-08-08 PROCEDURE — 97116 GAIT TRAINING THERAPY: CPT

## 2018-08-08 PROCEDURE — 97110 THERAPEUTIC EXERCISES: CPT

## 2018-08-08 PROCEDURE — 97530 THERAPEUTIC ACTIVITIES: CPT

## 2018-08-08 NOTE — PROGRESS NOTES
Dx:      -Status post total right knee replacement  -Primary osteoarthritis of right knee   -Screening for cardiovascular condition    -Eval and treat right knee WBAT  -S/p TKR sx 6-27-18    Authorized # of Visits:  No prior authorization          Next MD progress functional activity,strength and balance    Date: 7/18/2018  Tx#: 2   Date: 7/23/2018  Tx#:  3   Date: 7/26/2018  Tx#:  4   Date: 7/30/2018  Tx#: 5 Date: 8/1/2018  Tx#: 6 Date: 8/6/2018  Tx#: 7 Date: 8/8/2018  Tx#: 8   -Gait training with single p hurdles x 3 each R/L lead   -Reviewed LE position and knee position with sit/stand transition with progression into functional sit/stand stand squat 12 reps x 3 -Reviewed LE position and knee position with sit/stand transition with progression into functio quad setting, supine SLR, functional squat sit/stand) -Encourage home program (seated AROM; quad setting, supine SLR, functional squat sit/stand) -- -- -      Passive knee flexion in supine Passive knee flexion and extension Passive knee flexion and extens

## 2018-08-08 NOTE — PROGRESS NOTES
Initial Post Discharge Follow Up   Discharge Date: 6/29/18  Contact Date: 8/8/2018    Consent Verification:  Assessment Completed With: Patient  HIPAA Verified?   Yes    Discharge Dx:   Primary osteoarthritis of right knee, s/p Right TKA by Dr. Shai Hassan [facility] in [place] [place]    Please fast for 4 hours. You may drink water. Please restrict all caffeine and nicotine 12 hours prior to your test (including chocolate, Excedrin, tea, or decaffeinated coffee).   Please note if you Josefina Chavira in [facility] [address] [place] [address]  [phone] THE [facility] in [facility] [address]  [address] [facility]

## 2018-08-13 ENCOUNTER — OFFICE VISIT (OUTPATIENT)
Dept: PHYSICAL THERAPY | Age: 73
End: 2018-08-13
Attending: ORTHOPAEDIC SURGERY
Payer: MEDICARE

## 2018-08-13 PROCEDURE — 97530 THERAPEUTIC ACTIVITIES: CPT

## 2018-08-13 PROCEDURE — 97116 GAIT TRAINING THERAPY: CPT

## 2018-08-13 PROCEDURE — 97110 THERAPEUTIC EXERCISES: CPT

## 2018-08-13 NOTE — PROGRESS NOTES
Dx:      -Status post total right knee replacement  -Primary osteoarthritis of right knee   -Screening for cardiovascular condition    -Eval and treat right knee WBAT  -S/p TKR sx 6-27-18    Authorized # of Visits:  No prior authorization          Next MD fashion with unilateral rail  -30 second sit/stand test to at least 12 repetitions PROGRESSING  -Gluteus medius at least 4/5  -Ability to perform to household chores activities with minimal difficulty (Based upon patient specific functional scale rating of position; step up to 4\" step/ over hurdles/ on foam -AROM - supine knee flexion/extension with 65 cm stabality ball:  3 min x 2  -Supine alternating leg raise with contralateral gluteal press:  2 min x 2    -Reviewed sitting knee extension 5 second hold 1 contraction 10 second on/30 second off (Panamanian) x 15 minutues NMES R quad max tolerated contraction 10 second on/30 second off (Panamanian) x 15 minutues -Instruction in use of patient provided home TENS unit; handout provided regarding parameters and recomm

## 2018-08-15 ENCOUNTER — HOSPITAL ENCOUNTER (OUTPATIENT)
Dept: CV DIAGNOSTICS | Age: 73
Discharge: HOME | End: 2018-08-15
Attending: INTERNAL MEDICINE
Payer: MEDICARE

## 2018-08-15 ENCOUNTER — APPOINTMENT (OUTPATIENT)
Dept: PHYSICAL THERAPY | Age: 73
End: 2018-08-15
Attending: ORTHOPAEDIC SURGERY
Payer: MEDICARE

## 2018-08-15 DIAGNOSIS — R94.31 ABNORMAL ECG: ICD-10-CM

## 2018-08-15 DIAGNOSIS — I48.91 ATRIAL FIBRILLATION (HCC): ICD-10-CM

## 2018-08-15 DIAGNOSIS — I44.7 LBBB (LEFT BUNDLE BRANCH BLOCK): ICD-10-CM

## 2018-08-15 PROCEDURE — 78452 HT MUSCLE IMAGE SPECT MULT: CPT | Performed by: INTERNAL MEDICINE

## 2018-08-15 PROCEDURE — 93018 CV STRESS TEST I&R ONLY: CPT | Performed by: INTERNAL MEDICINE

## 2018-08-15 PROCEDURE — 93017 CV STRESS TEST TRACING ONLY: CPT | Performed by: INTERNAL MEDICINE

## 2018-08-16 ENCOUNTER — APPOINTMENT (OUTPATIENT)
Dept: PHYSICAL THERAPY | Age: 73
End: 2018-08-16
Attending: ORTHOPAEDIC SURGERY
Payer: MEDICARE

## 2018-08-21 ENCOUNTER — PRIOR ORIGINAL RECORDS (OUTPATIENT)
Dept: OTHER | Age: 73
End: 2018-08-21

## 2018-08-21 ENCOUNTER — APPOINTMENT (OUTPATIENT)
Dept: PHYSICAL THERAPY | Age: 73
End: 2018-08-21
Attending: ORTHOPAEDIC SURGERY
Payer: MEDICARE

## 2018-08-23 ENCOUNTER — OFFICE VISIT (OUTPATIENT)
Dept: PHYSICAL THERAPY | Age: 73
End: 2018-08-23
Attending: ORTHOPAEDIC SURGERY
Payer: MEDICARE

## 2018-08-23 PROCEDURE — 97140 MANUAL THERAPY 1/> REGIONS: CPT

## 2018-08-23 PROCEDURE — 97110 THERAPEUTIC EXERCISES: CPT

## 2018-08-23 NOTE — PROGRESS NOTES
Dx:      -Status post total right knee replacement  -Primary osteoarthritis of right knee   -Screening for cardiovascular condition    -Eval and treat right knee WBAT  -S/p TKR sx 6-27-18    Authorized # of Visits:  No prior authorization          Next MD 4/5  -Ability to perform to household chores activities with minimal difficulty (Based upon patient specific functional scale rating of at least 8/10      Plan: Remain at present frequency; lower quarter strengthening program; anticipate 6 additional visit min; see above objective Gait training ind; cueing for step length and lower extremity position; step up to 4\" step/ over hurdles/ on foam -AROM - supine knee flexion/extension with 65 cm stabality ball:  3 min x 2  -Supine alternating leg raise with cont height chair: 15 reps x 3 sets -4 square stepping over hurdles x 3 each R/L lead Medial step down:  20 reps x 3 sets (6 inch with unilateral UE support)   -Supine SLR 8 reps x 3 sets -Supine SLR 8 reps x 3 sets Supine SLR 8 reps x 3 sets Supine SLR 10 reps Around CJ: 20-39% impaired, limited, or restricted

## 2018-09-05 ENCOUNTER — OFFICE VISIT (OUTPATIENT)
Dept: PHYSICAL THERAPY | Age: 73
End: 2018-09-05
Attending: ORTHOPAEDIC SURGERY
Payer: MEDICARE

## 2018-09-05 PROCEDURE — 97110 THERAPEUTIC EXERCISES: CPT

## 2018-09-05 PROCEDURE — 97112 NEUROMUSCULAR REEDUCATION: CPT

## 2018-09-05 PROCEDURE — 97530 THERAPEUTIC ACTIVITIES: CPT

## 2018-09-05 NOTE — PROGRESS NOTES
Dx:      -Status post total right knee replacement  -Primary osteoarthritis of right knee   -Screening for cardiovascular condition    -Eval and treat right knee WBAT  -S/p TKR sx 6-27-18    Authorized # of Visits:  No prior authorization          Next MD 8/23/2018  Tx#:  10   Date: 9/5/2018  Tx#:  11     -Seated recumbent bike 5.0 level resistance x 10 min (seat distance 8) -Seated recumbent bike 5.0 level resistance x 10 min (seat distance 8) -Seated recumbent bike 6.0 level resistance x 10 min (seat dist Therapeutic excercise x 1  Gait training x 1  Therapeutic activity x 1  Total Timed Treatment: 45 min    Total Treatment Time: 45 min  Gcodes updated 8/23/2018:  Current G Code:  Mobility: Walking and Moving Around CJ: 2-39% impaired, limited, or restrict

## 2018-09-07 ENCOUNTER — APPOINTMENT (OUTPATIENT)
Dept: PHYSICAL THERAPY | Age: 73
End: 2018-09-07
Attending: ORTHOPAEDIC SURGERY
Payer: MEDICARE

## 2018-09-10 ENCOUNTER — APPOINTMENT (OUTPATIENT)
Dept: PHYSICAL THERAPY | Age: 73
End: 2018-09-10
Attending: ORTHOPAEDIC SURGERY
Payer: MEDICARE

## 2018-09-13 ENCOUNTER — APPOINTMENT (OUTPATIENT)
Dept: PHYSICAL THERAPY | Age: 73
End: 2018-09-13
Attending: ORTHOPAEDIC SURGERY
Payer: MEDICARE

## 2018-09-13 ENCOUNTER — PRIOR ORIGINAL RECORDS (OUTPATIENT)
Dept: OTHER | Age: 73
End: 2018-09-13

## 2018-09-26 ENCOUNTER — TELEPHONE (OUTPATIENT)
Dept: FAMILY MEDICINE CLINIC | Facility: CLINIC | Age: 73
End: 2018-09-26

## 2018-09-26 DIAGNOSIS — I44.7 LBBB (LEFT BUNDLE BRANCH BLOCK): ICD-10-CM

## 2018-09-26 DIAGNOSIS — I48.91 ATRIAL FIBRILLATION, UNSPECIFIED TYPE (HCC): Primary | ICD-10-CM

## 2018-09-26 NOTE — TELEPHONE ENCOUNTER
SCHEDULED TOMORROW @ [facility] [employer] [facility], THEY TOLD HER THEY NEED 2 REFERRALS--ONE FOR DR Renata Morales FOR OFFICE VISITS & ONE FOR TESTING, FAX  [phone], NEED ASAP, CALL PT

## 2018-10-02 NOTE — TELEPHONE ENCOUNTER
Event monitor referral approved. Referral number [id number]. Referral to Dr. Lakhwinder Worrell approved. Referral number [id number]. Referrals faxed to number provided.

## 2018-10-08 ENCOUNTER — PRIOR ORIGINAL RECORDS (OUTPATIENT)
Dept: OTHER | Age: 73
End: 2018-10-08

## 2018-10-11 ENCOUNTER — HOSPITAL ENCOUNTER (OUTPATIENT)
Dept: CV DIAGNOSTICS | Facility: HOSPITAL | Age: 73
Discharge: HOME | End: 2018-10-11
Attending: FAMILY MEDICINE
Payer: MEDICARE

## 2018-10-11 DIAGNOSIS — I48.91 ATRIAL FIBRILLATION, UNSPECIFIED TYPE (HCC): ICD-10-CM

## 2018-10-11 DIAGNOSIS — I44.7 LBBB (LEFT BUNDLE BRANCH BLOCK): ICD-10-CM

## 2018-10-11 PROCEDURE — 93270 REMOTE 30 DAY ECG REV/REPORT: CPT | Performed by: FAMILY MEDICINE

## 2018-10-11 PROCEDURE — 93272 ECG/REVIEW INTERPRET ONLY: CPT | Performed by: FAMILY MEDICINE

## 2018-10-11 PROCEDURE — 93271 ECG/MONITORING AND ANALYSIS: CPT | Performed by: FAMILY MEDICINE

## 2018-10-12 ENCOUNTER — PRIOR ORIGINAL RECORDS (OUTPATIENT)
Dept: OTHER | Age: 73
End: 2018-10-12

## 2018-10-17 ENCOUNTER — MED REC SCAN ONLY (OUTPATIENT)
Dept: FAMILY MEDICINE CLINIC | Facility: CLINIC | Age: 73
End: 2018-10-17

## 2018-11-09 ENCOUNTER — PATIENT OUTREACH (OUTPATIENT)
Dept: FAMILY MEDICINE CLINIC | Facility: CLINIC | Age: 73
End: 2018-11-09

## 2018-11-19 RX ORDER — AMLODIPINE BESYLATE 5 MG/1
TABLET ORAL
Qty: 90 TABLET | Refills: 0 | Status: SHIPPED | OUTPATIENT
Start: 2018-11-19 | End: 2018-11-20

## 2018-11-20 ENCOUNTER — TELEPHONE (OUTPATIENT)
Dept: FAMILY MEDICINE CLINIC | Facility: CLINIC | Age: 73
End: 2018-11-20

## 2018-11-20 RX ORDER — AMLODIPINE BESYLATE 5 MG/1
TABLET ORAL
Qty: 14 TABLET | Refills: 0 | Status: SHIPPED | OUTPATIENT
Start: 2018-11-20 | End: 2019-01-29

## 2018-11-20 NOTE — TELEPHONE ENCOUNTER
AMLODIPINE BESYLATE 5 MG Oral Tab  They are asking for a two week supply. They are traveling and she is going to run out.   Call to [facility]  [phone]

## 2018-11-30 ENCOUNTER — PRIOR ORIGINAL RECORDS (OUTPATIENT)
Dept: OTHER | Age: 73
End: 2018-11-30

## 2018-12-03 ENCOUNTER — PRIOR ORIGINAL RECORDS (OUTPATIENT)
Dept: OTHER | Age: 73
End: 2018-12-03

## 2018-12-11 ENCOUNTER — HOSPITAL ENCOUNTER (OUTPATIENT)
Dept: GENERAL RADIOLOGY | Age: 73
Discharge: HOME | End: 2018-12-11
Attending: ORTHOPAEDIC SURGERY
Payer: MEDICARE

## 2018-12-11 DIAGNOSIS — Z01.89 ENCOUNTER FOR LOWER EXTREMITY COMPARISON IMAGING STUDY: ICD-10-CM

## 2018-12-11 DIAGNOSIS — M17.12 PRIMARY OSTEOARTHRITIS OF LEFT KNEE: ICD-10-CM

## 2018-12-11 PROCEDURE — 73562 X-RAY EXAM OF KNEE 3: CPT | Performed by: ORTHOPAEDIC SURGERY

## 2018-12-11 PROCEDURE — 73564 X-RAY EXAM KNEE 4 OR MORE: CPT | Performed by: ORTHOPAEDIC SURGERY

## 2018-12-27 ENCOUNTER — APPOINTMENT (OUTPATIENT)
Dept: HEMATOLOGY/ONCOLOGY | Age: 73
End: 2018-12-27
Attending: INTERNAL MEDICINE
Payer: MEDICARE

## 2018-12-27 ENCOUNTER — TELEPHONE (OUTPATIENT)
Dept: FAMILY MEDICINE CLINIC | Facility: CLINIC | Age: 73
End: 2018-12-27

## 2018-12-27 NOTE — TELEPHONE ENCOUNTER
Future Appointments   Date Time Provider Cristina Cyndi   12/28/2018 10:15 AM Carolina Farr DO [facility] [place]   1/10/2019 10:20 AM PF CAMMIE RM1 PF [facility] [place]   1/16/2019  9:45 AM Ravin Carlisle MD PF HEM ON[facility] [place]

## 2018-12-28 ENCOUNTER — OFFICE VISIT (OUTPATIENT)
Dept: FAMILY MEDICINE CLINIC | Facility: CLINIC | Age: 73
End: 2018-12-28

## 2018-12-28 VITALS
DIASTOLIC BLOOD PRESSURE: 70 MMHG | HEIGHT: 64 IN | SYSTOLIC BLOOD PRESSURE: 120 MMHG | HEART RATE: 78 BPM | TEMPERATURE: 98 F | BODY MASS INDEX: 39 KG/M2 | OXYGEN SATURATION: 94 %

## 2018-12-28 DIAGNOSIS — J40 BRONCHITIS: Primary | ICD-10-CM

## 2018-12-28 DIAGNOSIS — Z12.31 VISIT FOR SCREENING MAMMOGRAM: ICD-10-CM

## 2018-12-28 PROCEDURE — 99214 OFFICE O/P EST MOD 30 MIN: CPT | Performed by: FAMILY MEDICINE

## 2018-12-28 RX ORDER — CEFPROZIL 250 MG/1
250 TABLET, FILM COATED ORAL 2 TIMES DAILY
Qty: 20 TABLET | Refills: 0 | Status: SHIPPED | OUTPATIENT
Start: 2018-12-28 | End: 2019-01-02 | Stop reason: ALTCHOICE

## 2018-12-28 RX ORDER — CODEINE PHOSPHATE AND GUAIFENESIN 10; 100 MG/5ML; MG/5ML
SOLUTION ORAL
Qty: 120 ML | Refills: 0 | Status: SHIPPED | OUTPATIENT
Start: 2018-12-28 | End: 2019-01-04

## 2018-12-28 RX ORDER — ALBUTEROL SULFATE 90 UG/1
2 AEROSOL, METERED RESPIRATORY (INHALATION) EVERY 6 HOURS PRN
Qty: 1 INHALER | Refills: 0 | Status: SHIPPED | OUTPATIENT
Start: 2018-12-28 | End: 2020-02-06 | Stop reason: ALTCHOICE

## 2018-12-28 NOTE — PROGRESS NOTES
Tim Washington      is a 72year old female. Patient presents with: Other: sore throat, cough-cannot sleep or lay down, mucus-started on 12/24-exposed to strep. ...taking delsym, nyquil. ...rom 1      HPI:   Bad cough, congestion, sinus pressure, fever, 09/19/2003    Occurred after a long plane ride ON HRT   • Visual impairment     READERS     Past Surgical History:   Procedure Laterality Date   • CATARACT  10/14/08    Right eye   • CATARACT  10/28/08    Left eye   • COLONOSCOPY  2016   • FRACTURE SURGERY or guarding. No organomegaly or CVA tenderness. EXTREMITIES: no edema          ASSESSMENT AND PLAN:     Bronchitis  (primary encounter diagnosis)  Visit for screening mammogram    Treat symptomatically. Rest, fluids and Tylenol.   Discussed danger signs i

## 2019-01-02 ENCOUNTER — OFFICE VISIT (OUTPATIENT)
Dept: FAMILY MEDICINE CLINIC | Facility: CLINIC | Age: 74
End: 2019-01-02

## 2019-01-02 ENCOUNTER — TELEPHONE (OUTPATIENT)
Dept: FAMILY MEDICINE CLINIC | Facility: CLINIC | Age: 74
End: 2019-01-02

## 2019-01-02 VITALS
TEMPERATURE: 97 F | HEART RATE: 67 BPM | DIASTOLIC BLOOD PRESSURE: 76 MMHG | OXYGEN SATURATION: 97 % | SYSTOLIC BLOOD PRESSURE: 120 MMHG

## 2019-01-02 DIAGNOSIS — J01.00 ACUTE NON-RECURRENT MAXILLARY SINUSITIS: Primary | ICD-10-CM

## 2019-01-02 PROCEDURE — 99214 OFFICE O/P EST MOD 30 MIN: CPT | Performed by: FAMILY MEDICINE

## 2019-01-02 RX ORDER — DOXYCYCLINE HYCLATE 100 MG
100 TABLET ORAL 2 TIMES DAILY
Qty: 20 TABLET | Refills: 0 | Status: SHIPPED | OUTPATIENT
Start: 2019-01-02 | End: 2019-01-16 | Stop reason: ALTCHOICE

## 2019-01-02 NOTE — PROGRESS NOTES
Deanne Escobar is a 72year old female. Patient presents with: Other: fup on bronchits--feels like sinuses are infected now, fever last night, a lot of snot coming out of nose-teeth hurt, sore throat. ...room 2      HPI:   Patient complains of increase diagnostic radiation    • Hyperlipidemia    • Left bundle branch block 11/20/2006   • NAFLD (nonalcoholic fatty liver disease)     Diffuse fatty infiltration of the liver on ultrasound August 2011   • Pneumonia due to organism 2014    LEFT LUNG   • Primary Temp 97.4 °F (36.3 °C) (Tympanic)   SpO2 97%   GENERAL: well developed, well nourished,in no apparent distress  SKIN: no rashes,no suspicious lesions  HEENT: atraumatic, normocephalic, R TM normal, L TM normal, Pharynx  With PND  NECK: supple, no cervical

## 2019-01-02 NOTE — TELEPHONE ENCOUNTER
Called the patient     Future Appointments   Date Time Provider Cristina Hanna   1/2/2019  1:30 PM To Stone DO [facility] [place]   1/10/2019 10:20 AM PF CAMMIE RM1 PF MAMMO [place]   1/16/2019  9:45 AM Jessie Carlisle MD PF HEM ONC [place]

## 2019-01-02 NOTE — TELEPHONE ENCOUNTER
Calling the patient-     She has had her symptoms since  Xmtabitha deckere- saw Dr Carlos Eduardo Kinney last Friday and was told she had bronchitis     Fever went up again last night  The x2-3 days she has had a lot sinus drainage (goopy/thick)    She has been having some haseeb

## 2019-01-02 NOTE — TELEPHONE ENCOUNTER
PT SAYS SHE IS FEELING \"AWFUL\" POSSIBLE SINUS INFECTION, FEVER, SORE GUMS, NOT SLEEPING. PT WOULD LIKE TO BE SEEN TODAY IF POSSIBLE.  PLEASE CALL

## 2019-01-04 RX ORDER — CODEINE PHOSPHATE AND GUAIFENESIN 10; 100 MG/5ML; MG/5ML
SOLUTION ORAL
Qty: 120 ML | Refills: 0 | Status: SHIPPED | OUTPATIENT
Start: 2019-01-04 | End: 2019-05-20 | Stop reason: ALTCHOICE

## 2019-01-04 NOTE — TELEPHONE ENCOUNTER
guaiFENesin-codeine (CHERATUSSIN AC) 100-10 MG/5ML Oral Solution     PT , HALLE WILL  SCRIPT.  PLEASE CALL WHEN READY TO

## 2019-01-07 ENCOUNTER — MYAURORA ACCOUNT LINK (OUTPATIENT)
Dept: OTHER | Age: 74
End: 2019-01-07

## 2019-01-07 ENCOUNTER — PRIOR ORIGINAL RECORDS (OUTPATIENT)
Dept: OTHER | Age: 74
End: 2019-01-07

## 2019-01-10 ENCOUNTER — HOSPITAL ENCOUNTER (OUTPATIENT)
Dept: MAMMOGRAPHY | Age: 74
Discharge: HOME | End: 2019-01-10
Attending: INTERNAL MEDICINE
Payer: MEDICARE

## 2019-01-10 DIAGNOSIS — C50.919 MALIGNANT NEOPLASM OF FEMALE BREAST, UNSPECIFIED ESTROGEN RECEPTOR STATUS, UNSPECIFIED LATERALITY, UNSPECIFIED SITE OF BREAST (HCC): ICD-10-CM

## 2019-01-10 PROCEDURE — 77063 BREAST TOMOSYNTHESIS BI: CPT | Performed by: INTERNAL MEDICINE

## 2019-01-10 PROCEDURE — 77067 SCR MAMMO BI INCL CAD: CPT | Performed by: INTERNAL MEDICINE

## 2019-01-11 ENCOUNTER — PATIENT OUTREACH (OUTPATIENT)
Dept: FAMILY MEDICINE CLINIC | Facility: CLINIC | Age: 74
End: 2019-01-11

## 2019-01-14 ENCOUNTER — HOSPITAL ENCOUNTER (OUTPATIENT)
Dept: MAMMOGRAPHY | Facility: HOSPITAL | Age: 74
Discharge: HOME | End: 2019-01-14
Attending: INTERNAL MEDICINE
Payer: MEDICARE

## 2019-01-14 DIAGNOSIS — R92.2 INCONCLUSIVE MAMMOGRAM: ICD-10-CM

## 2019-01-14 PROCEDURE — 77062 BREAST TOMOSYNTHESIS BI: CPT | Performed by: INTERNAL MEDICINE

## 2019-01-14 PROCEDURE — 77066 DX MAMMO INCL CAD BI: CPT | Performed by: INTERNAL MEDICINE

## 2019-01-14 PROCEDURE — 76642 ULTRASOUND BREAST LIMITED: CPT | Performed by: INTERNAL MEDICINE

## 2019-01-16 ENCOUNTER — OFFICE VISIT (OUTPATIENT)
Dept: HEMATOLOGY/ONCOLOGY | Age: 74
End: 2019-01-16
Attending: INTERNAL MEDICINE
Payer: MEDICARE

## 2019-01-16 VITALS
WEIGHT: 237.5 LBS | DIASTOLIC BLOOD PRESSURE: 74 MMHG | TEMPERATURE: 98 F | HEART RATE: 54 BPM | BODY MASS INDEX: 41 KG/M2 | OXYGEN SATURATION: 95 % | SYSTOLIC BLOOD PRESSURE: 128 MMHG | RESPIRATION RATE: 20 BRPM

## 2019-01-16 DIAGNOSIS — Z17.0 MALIGNANT NEOPLASM OF LEFT BREAST IN FEMALE, ESTROGEN RECEPTOR POSITIVE, UNSPECIFIED SITE OF BREAST (HCC): Primary | ICD-10-CM

## 2019-01-16 DIAGNOSIS — N60.02 BREAST CYST, LEFT: ICD-10-CM

## 2019-01-16 DIAGNOSIS — C50.912 MALIGNANT NEOPLASM OF LEFT BREAST IN FEMALE, ESTROGEN RECEPTOR POSITIVE, UNSPECIFIED SITE OF BREAST (HCC): Primary | ICD-10-CM

## 2019-01-16 PROCEDURE — 99213 OFFICE O/P EST LOW 20 MIN: CPT | Performed by: INTERNAL MEDICINE

## 2019-01-16 NOTE — PROGRESS NOTES
Cancer Center Progress Note  Patient Name: Dionisio Vieira   YOB: 1945   Medical Record Number: [id number]   CSN: [id number]   Attending Physician: Rojas Abraham M.D.        Date of Visit: 1/16/2019    Chief Complaint:  Patient presen hips 5/16/2017   • Primary osteoarthritis of left knee 5/16/2017   • Primary osteoarthritis of right knee 05/16/2017   • Pulmonary embolism ([facility] [address] 09/19/2003    Occurred after a long plane ride ON HRT   • Visual impairment     READERS       Past Surgical Hi Sexual Activity      Alcohol use: No        Alcohol/week: 0.0 - 2.4 oz      Drug use: No      Sexual activity: Not on file    Other Topics      Concerns:        Caffeine Concern: Not Asked        Exercise: Not Asked        Seat Belt: Not Asked        Speci or palpable lymph nodes. Respiratory No dyspnea, pleuritic chest pain, cough or hemoptysis. Cardiovascular No anginal chest pain, palpitations or orthopnea. Gastrointestinal No nausea, vomiting, diarrhea, GI bleeding, or constipation. NL appetite. Plan:  Breast cancer: L breast, lower inner quadrant. Stage Ia (I5kN3E7) ER/HI+, HER-2 Neg. She is s/p lumpectomy and radiation. She was offered adjuvant endocrine therapy and declined.   We reviewed the statistics indicating reduced risk of recurrence and

## 2019-01-29 ENCOUNTER — PRIOR ORIGINAL RECORDS (OUTPATIENT)
Dept: OTHER | Age: 74
End: 2019-01-29

## 2019-01-29 RX ORDER — AMLODIPINE BESYLATE 5 MG/1
TABLET ORAL
Qty: 90 TABLET | Refills: 1 | Status: SHIPPED | OUTPATIENT
Start: 2019-01-29 | End: 2019-09-09

## 2019-01-29 NOTE — TELEPHONE ENCOUNTER
Last office visit:  01/02/19  Last refill:  11/20/18   #14, no refills  Last cmp:  06/28/18  Last bp:  01/16/19   128/74     No future appointments.

## 2019-02-04 ENCOUNTER — PRIOR ORIGINAL RECORDS (OUTPATIENT)
Dept: OTHER | Age: 74
End: 2019-02-04

## 2019-02-05 ENCOUNTER — NURSE ONLY (OUTPATIENT)
Dept: FAMILY MEDICINE CLINIC | Facility: CLINIC | Age: 74
End: 2019-02-05
Payer: MEDICARE

## 2019-02-05 ENCOUNTER — TELEPHONE (OUTPATIENT)
Dept: FAMILY MEDICINE CLINIC | Facility: CLINIC | Age: 74
End: 2019-02-05

## 2019-02-05 DIAGNOSIS — R35.0 URINARY FREQUENCY: Primary | ICD-10-CM

## 2019-02-05 LAB
MULTISTIX LOT#: NORMAL NUMERIC
NITRITE, URINE: POSITIVE
PH, URINE: 6 (ref 4.5–8)
PROTEIN (URINE DIPSTICK): 100 MG/DL
SPECIFIC GRAVITY: 1.02 (ref 1–1.03)
URINE-COLOR: YELLOW
UROBILINOGEN,SEMI-QN: 0.2 MG/DL (ref 0–1.9)

## 2019-02-05 PROCEDURE — 87086 URINE CULTURE/COLONY COUNT: CPT | Performed by: FAMILY MEDICINE

## 2019-02-05 PROCEDURE — 87186 SC STD MICRODIL/AGAR DIL: CPT | Performed by: FAMILY MEDICINE

## 2019-02-05 PROCEDURE — 87088 URINE BACTERIA CULTURE: CPT | Performed by: FAMILY MEDICINE

## 2019-02-05 PROCEDURE — 81003 URINALYSIS AUTO W/O SCOPE: CPT | Performed by: FAMILY MEDICINE

## 2019-02-05 RX ORDER — CEPHALEXIN 500 MG/1
500 CAPSULE ORAL 3 TIMES DAILY
Qty: 15 CAPSULE | Refills: 0 | Status: SHIPPED | OUTPATIENT
Start: 2019-02-05 | End: 2019-02-10

## 2019-02-05 NOTE — PROGRESS NOTES
Calling the patient to advise that we are sending for urine culture   Will call in Keflex 500mg TID x5 days

## 2019-02-05 NOTE — TELEPHONE ENCOUNTER
Calling the patient-   Urinary frequency but when she goes she doesn't void much. Her bladder always feels full     I have put her in the nurse schedule to give us a urine specimen. If she can not make it I did also recommend WIC.      The patient advised t

## 2019-02-05 NOTE — TELEPHONE ENCOUNTER
Called the patient and left a detailed message that we are calling in Keflex 500mg TID x5 days     Will call once the UC results are back to confirm diagnosis/treatment

## 2019-02-28 VITALS
SYSTOLIC BLOOD PRESSURE: 140 MMHG | WEIGHT: 233 LBS | DIASTOLIC BLOOD PRESSURE: 70 MMHG | BODY MASS INDEX: 38.82 KG/M2 | HEART RATE: 80 BPM | HEIGHT: 65 IN

## 2019-02-28 VITALS
BODY MASS INDEX: 37.32 KG/M2 | HEART RATE: 54 BPM | SYSTOLIC BLOOD PRESSURE: 126 MMHG | WEIGHT: 224 LBS | HEIGHT: 65 IN | DIASTOLIC BLOOD PRESSURE: 68 MMHG

## 2019-02-28 VITALS
HEIGHT: 65 IN | WEIGHT: 230 LBS | BODY MASS INDEX: 38.32 KG/M2 | SYSTOLIC BLOOD PRESSURE: 120 MMHG | HEART RATE: 67 BPM | DIASTOLIC BLOOD PRESSURE: 78 MMHG

## 2019-03-19 ENCOUNTER — PATIENT OUTREACH (OUTPATIENT)
Dept: FAMILY MEDICINE CLINIC | Facility: CLINIC | Age: 74
End: 2019-03-19

## 2019-04-05 RX ORDER — METOPROLOL SUCCINATE 50 MG/1
TABLET, EXTENDED RELEASE ORAL
COMMUNITY
Start: 2019-01-07 | End: 2020-01-27 | Stop reason: SDUPTHER

## 2019-04-05 RX ORDER — AMLODIPINE BESYLATE 5 MG/1
5 TABLET ORAL DAILY
COMMUNITY
Start: 2018-07-17

## 2019-04-05 RX ORDER — SIMVASTATIN 20 MG
20 TABLET ORAL NIGHTLY
COMMUNITY
Start: 2018-07-17

## 2019-04-08 ENCOUNTER — OFFICE VISIT (OUTPATIENT)
Dept: FAMILY MEDICINE CLINIC | Facility: CLINIC | Age: 74
End: 2019-04-08
Payer: MEDICARE

## 2019-04-08 VITALS
BODY MASS INDEX: 40.01 KG/M2 | TEMPERATURE: 99 F | HEIGHT: 64.75 IN | WEIGHT: 237.25 LBS | OXYGEN SATURATION: 97 % | HEART RATE: 54 BPM | SYSTOLIC BLOOD PRESSURE: 138 MMHG | DIASTOLIC BLOOD PRESSURE: 80 MMHG

## 2019-04-08 DIAGNOSIS — E66.01 SEVERE OBESITY (BMI >= 40) (HCC): ICD-10-CM

## 2019-04-08 DIAGNOSIS — M25.512 BILATERAL SHOULDER PAIN, UNSPECIFIED CHRONICITY: ICD-10-CM

## 2019-04-08 DIAGNOSIS — M25.511 BILATERAL SHOULDER PAIN, UNSPECIFIED CHRONICITY: ICD-10-CM

## 2019-04-08 DIAGNOSIS — J30.9 ALLERGIC RHINITIS, UNSPECIFIED SEASONALITY, UNSPECIFIED TRIGGER: Primary | ICD-10-CM

## 2019-04-08 DIAGNOSIS — I70.0 AORTIC CALCIFICATION (HCC): ICD-10-CM

## 2019-04-08 PROCEDURE — 99214 OFFICE O/P EST MOD 30 MIN: CPT | Performed by: FAMILY MEDICINE

## 2019-04-08 RX ORDER — MONTELUKAST SODIUM 10 MG/1
10 TABLET ORAL DAILY
Qty: 90 TABLET | Refills: 3 | Status: SHIPPED | OUTPATIENT
Start: 2019-04-08 | End: 2019-06-24

## 2019-04-08 NOTE — PROGRESS NOTES
Kym Montero is a 72year old female. Patient presents with: Other: left eye redness, watery started \"a long time ago\" . .. stuffy nose. . room 1  Other: arm and shoulder pain started \"months ago\". . waking pt up at night. . room 1      HPI:   She c nightly. Disp: 90 tablet Rfl: 3     No current facility-administered medications on file prior to visit.       Past Medical History:   Diagnosis Date   • Allergic rhinitis due to allergen 12/27/2017   • Arrhythmia     LBBB   • [facility] 1/6/16 Cancer Sister 50        estimate        Social History:  Social History    Tobacco Use      Smoking status: Never Smoker      Smokeless tobacco: Never Used    Alcohol use: No      Alcohol/week: 0.0 - 2.4 oz    Drug use: No         REVIEW OF SYSTEMS:   GENE blood pressure cholesterol and weight to help with the atherosclerosis. Keep working on weight loss. Discussed shoulder pain. Have given her a handout with exercises to try.   I also think she needs to see the eye doctor about a possibly obstructed tear

## 2019-04-19 RX ORDER — SIMVASTATIN 20 MG
20 TABLET ORAL NIGHTLY
Qty: 90 TABLET | Refills: 0 | Status: SHIPPED | OUTPATIENT
Start: 2019-04-19 | End: 2019-07-08

## 2019-04-19 NOTE — TELEPHONE ENCOUNTER
No future appointments. Patient advised of below and verbalizes understanding. Appointment scheduled.   Future Appointments   Date Time Provider Cristina Hanna   4/26/2019  9:00 AM REF EMG SW FAM [facility] REF EM[facility] Ref Lab [facility]

## 2019-05-03 ENCOUNTER — TELEPHONE (OUTPATIENT)
Dept: FAMILY MEDICINE CLINIC | Facility: CLINIC | Age: 74
End: 2019-05-03

## 2019-05-03 NOTE — TELEPHONE ENCOUNTER
Right foot- 2nd toe. All her life she has had problems with ingrown toenails. Yesterday she cut out the part that seemed to be \"ingroen\"  She has been soaking it; placing neosporin on it.  She has a band-aid on it     There does not seem to be any disch

## 2019-05-03 NOTE — TELEPHONE ENCOUNTER
JACQUELINE THINKS SHE HAS AN INFECTED TOE, SX STARTED YESTERDAY. SHE IS WONDERING IF SHE CAN GET SOMETHING FOR THIS?

## 2019-05-03 NOTE — TELEPHONE ENCOUNTER
She is doing everything correctly. I would have here come in if getting worse. Would then eval if an antibiotic is necessary.

## 2019-05-20 ENCOUNTER — OFFICE VISIT (OUTPATIENT)
Dept: FAMILY MEDICINE CLINIC | Facility: CLINIC | Age: 74
End: 2019-05-20
Payer: MEDICARE

## 2019-05-20 ENCOUNTER — LABORATORY ENCOUNTER (OUTPATIENT)
Dept: LAB | Age: 74
End: 2019-05-20
Attending: FAMILY MEDICINE
Payer: MEDICARE

## 2019-05-20 VITALS
BODY MASS INDEX: 39.86 KG/M2 | HEIGHT: 64.25 IN | HEART RATE: 62 BPM | SYSTOLIC BLOOD PRESSURE: 140 MMHG | TEMPERATURE: 99 F | DIASTOLIC BLOOD PRESSURE: 78 MMHG | WEIGHT: 233.5 LBS | OXYGEN SATURATION: 98 %

## 2019-05-20 DIAGNOSIS — E78.5 HYPERLIPIDEMIA, UNSPECIFIED HYPERLIPIDEMIA TYPE: ICD-10-CM

## 2019-05-20 DIAGNOSIS — I10 HYPERTENSION, UNSPECIFIED TYPE: ICD-10-CM

## 2019-05-20 DIAGNOSIS — Z00.00 ENCOUNTER FOR ANNUAL HEALTH EXAMINATION: Primary | ICD-10-CM

## 2019-05-20 DIAGNOSIS — D64.9 ANEMIA, UNSPECIFIED TYPE: ICD-10-CM

## 2019-05-20 DIAGNOSIS — C50.919 MALIGNANT NEOPLASM OF FEMALE BREAST, UNSPECIFIED ESTROGEN RECEPTOR STATUS, UNSPECIFIED LATERALITY, UNSPECIFIED SITE OF BREAST (HCC): ICD-10-CM

## 2019-05-20 DIAGNOSIS — I48.91 ATRIAL FIBRILLATION, UNSPECIFIED TYPE (HCC): ICD-10-CM

## 2019-05-20 DIAGNOSIS — I70.0 AORTIC CALCIFICATION (HCC): ICD-10-CM

## 2019-05-20 DIAGNOSIS — M17.11 PRIMARY OSTEOARTHRITIS OF RIGHT KNEE: ICD-10-CM

## 2019-05-20 PROCEDURE — 96160 PT-FOCUSED HLTH RISK ASSMT: CPT | Performed by: FAMILY MEDICINE

## 2019-05-20 PROCEDURE — 82728 ASSAY OF FERRITIN: CPT

## 2019-05-20 PROCEDURE — 83540 ASSAY OF IRON: CPT

## 2019-05-20 PROCEDURE — 99397 PER PM REEVAL EST PAT 65+ YR: CPT | Performed by: FAMILY MEDICINE

## 2019-05-20 PROCEDURE — 80053 COMPREHEN METABOLIC PANEL: CPT

## 2019-05-20 PROCEDURE — 82746 ASSAY OF FOLIC ACID SERUM: CPT

## 2019-05-20 PROCEDURE — 80061 LIPID PANEL: CPT

## 2019-05-20 PROCEDURE — G0439 PPPS, SUBSEQ VISIT: HCPCS | Performed by: FAMILY MEDICINE

## 2019-05-20 PROCEDURE — 85025 COMPLETE CBC W/AUTO DIFF WBC: CPT

## 2019-05-20 PROCEDURE — 82607 VITAMIN B-12: CPT

## 2019-05-20 PROCEDURE — 36415 COLL VENOUS BLD VENIPUNCTURE: CPT

## 2019-05-20 NOTE — PATIENT INSTRUCTIONS
Etta Hernandez's SCREENING SCHEDULE   Tests on this list are recommended by your physician but may not be covered, or covered at this frequency, by your insurer. Please check with your insurance carrier before scheduling to verify coverage.    NH years- more often if abnormal Colonoscopy due on 01/26/2026 Update Health Maintenance if applicable    Flex Sigmoidoscopy Screen  Covered every 5 years No results found for this or any previous visit. No flowsheet data found.      Fecal Occult Blood   Cover orders found for this or any previous visit. Please get once after your 65th birthday    Hepatitis B for Moderate/High Risk       No orders found for this or any previous visit.  Medium/high risk factors:   End-stage renal disease   Hemophiliacs who receive

## 2019-05-20 NOTE — PROGRESS NOTES
HPI:   Nic Freeman is a 72year old female who presents for a MA (Medicare Advantage) [facility] (Once per calendar year).     No complaints          Fall/Risk Assessment   She has been screened for Falls and is low risk: Fall/Risk Scorin (L) 06/28/2018    CREATSERUM 0.70 06/28/2018     (H) 06/28/2018        CBC  (most recent labs)   Lab Results   Component Value Date    WBC 8.8 06/29/2018    HGB 10.7 (L) 06/29/2018    .0 06/29/2018        ALLERGIES:   She is allergic to sulfa surgical history (3/13/09); other surgical history (1/20/10); other surgical history (12/11/12); other surgical history (2/6/15); other surgical history (1/5/16); Fracture surgery (2/11/93); colonoscopy (2016); and reduction left (Left, 02/2016).     Her fa no distress, appears stated age   Head:  Normocephalic, without obvious abnormality, atraumatic   Eyes:  PERRL, conjunctiva/corneas clear, EOM's intact both eyes   Ears:  Normal TM's and external ear canals, both ears   Nose: Nares normal, septum midline,m type  Controlled and stable      4. Hypertension, unspecified type  Controlled and stable      5. Aortic calcification (HCC)  Controlled and stable      6.  Atrial fibrillation, unspecified type ([facility]  Controlled and stable        PLAN SUMMARY:   Diagnoses Fasting Blood Sugar (FSB)Annually Glucose (mg/dL)   Date Value   06/28/2018 106 (H)          Cardiovascular Disease Screening     LDL Annually LDL Cholesterol (mg/dL)   Date Value   04/11/2018 101        EKG - w/ Initial Preventative Physical Exam only, live in the same house as a HepB virus carrier   Homosexual men   Illicit injectable drug abusers     Tetanus Toxoid  Only covered with a cut with metal- TD and TDaP Not covered by Medicare Part B No vaccine history found This may be covered with your pres

## 2019-05-21 DIAGNOSIS — E78.5 HYPERLIPIDEMIA, UNSPECIFIED HYPERLIPIDEMIA TYPE: Primary | ICD-10-CM

## 2019-05-21 DIAGNOSIS — I10 HYPERTENSION, UNSPECIFIED TYPE: ICD-10-CM

## 2019-06-24 RX ORDER — MONTELUKAST SODIUM 10 MG/1
10 TABLET ORAL DAILY
Qty: 90 TABLET | Refills: 2 | Status: SHIPPED | OUTPATIENT
Start: 2019-06-24 | End: 2020-02-06 | Stop reason: ALTCHOICE

## 2019-06-25 ENCOUNTER — HOSPITAL ENCOUNTER (OUTPATIENT)
Dept: GENERAL RADIOLOGY | Age: 74
Discharge: HOME | End: 2019-06-25
Attending: ORTHOPAEDIC SURGERY
Payer: MEDICARE

## 2019-06-25 DIAGNOSIS — Z96.651 STATUS POST TOTAL RIGHT KNEE REPLACEMENT: ICD-10-CM

## 2019-06-25 DIAGNOSIS — M25.572 LEFT ANKLE PAIN: ICD-10-CM

## 2019-06-25 PROCEDURE — 73560 X-RAY EXAM OF KNEE 1 OR 2: CPT | Performed by: ORTHOPAEDIC SURGERY

## 2019-06-25 PROCEDURE — 73610 X-RAY EXAM OF ANKLE: CPT | Performed by: ORTHOPAEDIC SURGERY

## 2019-07-01 ENCOUNTER — TELEPHONE (OUTPATIENT)
Dept: FAMILY MEDICINE CLINIC | Facility: CLINIC | Age: 74
End: 2019-07-01

## 2019-07-01 ENCOUNTER — TELEPHONE (OUTPATIENT)
Dept: CARDIOLOGY | Age: 74
End: 2019-07-01

## 2019-07-01 DIAGNOSIS — C50.312 MALIGNANT NEOPLASM OF LOWER-INNER QUADRANT OF LEFT FEMALE BREAST, UNSPECIFIED ESTROGEN RECEPTOR STATUS (HCC): Primary | ICD-10-CM

## 2019-07-01 DIAGNOSIS — I48.20 CHRONIC ATRIAL FIBRILLATION (CMD): Primary | ICD-10-CM

## 2019-07-01 NOTE — TELEPHONE ENCOUNTER
Dr Candida Jorge has placed the orders for US and mammogram 1/16/19       Placing the referral for Dr Candida Jorge for breast cancer

## 2019-07-01 NOTE — TELEPHONE ENCOUNTER
NEED REFERRAL FOR DR Camden Hanley, SHE WILL ALSO BE HAVING MAMMO & ULTRASOUND DONE, NOT SURE IF SHE NEEDS ORDERS OR REFERRALS FOR THOSE AS WELL?

## 2019-07-08 RX ORDER — SIMVASTATIN 20 MG
20 TABLET ORAL NIGHTLY
Qty: 90 TABLET | Refills: 3 | Status: SHIPPED | OUTPATIENT
Start: 2019-07-08 | End: 2020-06-12

## 2019-07-08 NOTE — TELEPHONE ENCOUNTER
Last rf 4/19/19 #90x0  Last ov 5/20/19  140/78  Last labs 5/20/19 re ck in 6-12 mo    Future Appointments   Date Time Provider Cristina Hanna   7/18/2019 10:40 AM PF CAMMIE RM1 PF MAMMO [place]   7/19/2019 11:45 AM Sarah Carlisle MD [facility] HEM ONC [place]

## 2019-07-10 ENCOUNTER — TELEPHONE (OUTPATIENT)
Dept: FAMILY MEDICINE CLINIC | Facility: CLINIC | Age: 74
End: 2019-07-10

## 2019-07-12 ENCOUNTER — HOSPITAL ENCOUNTER (OUTPATIENT)
Dept: CV DIAGNOSTICS | Age: 74
Discharge: HOME | End: 2019-07-12
Attending: INTERNAL MEDICINE
Payer: MEDICARE

## 2019-07-12 DIAGNOSIS — I48.20 CHRONIC ATRIAL FIBRILLATION (HCC): ICD-10-CM

## 2019-07-12 PROCEDURE — 93225 XTRNL ECG REC<48 HRS REC: CPT | Performed by: INTERNAL MEDICINE

## 2019-07-12 PROCEDURE — 93227 XTRNL ECG REC<48 HR R&I: CPT | Performed by: INTERNAL MEDICINE

## 2019-07-15 ENCOUNTER — APPOINTMENT (OUTPATIENT)
Dept: CARDIOLOGY | Age: 74
End: 2019-07-15

## 2019-07-18 ENCOUNTER — HOSPITAL ENCOUNTER (OUTPATIENT)
Dept: ULTRASOUND IMAGING | Age: 74
Discharge: HOME | End: 2019-07-18
Attending: INTERNAL MEDICINE
Payer: MEDICARE

## 2019-07-18 ENCOUNTER — HOSPITAL ENCOUNTER (OUTPATIENT)
Dept: MAMMOGRAPHY | Age: 74
Discharge: HOME | End: 2019-07-18
Attending: INTERNAL MEDICINE
Payer: MEDICARE

## 2019-07-18 DIAGNOSIS — N60.02 BREAST CYST, LEFT: ICD-10-CM

## 2019-07-18 DIAGNOSIS — Z17.0 MALIGNANT NEOPLASM OF LEFT BREAST IN FEMALE, ESTROGEN RECEPTOR POSITIVE, UNSPECIFIED SITE OF BREAST (HCC): ICD-10-CM

## 2019-07-18 DIAGNOSIS — C50.912 MALIGNANT NEOPLASM OF LEFT BREAST IN FEMALE, ESTROGEN RECEPTOR POSITIVE, UNSPECIFIED SITE OF BREAST (HCC): ICD-10-CM

## 2019-07-18 PROCEDURE — 77061 BREAST TOMOSYNTHESIS UNI: CPT | Performed by: INTERNAL MEDICINE

## 2019-07-18 PROCEDURE — 77065 DX MAMMO INCL CAD UNI: CPT | Performed by: INTERNAL MEDICINE

## 2019-07-18 PROCEDURE — 76642 ULTRASOUND BREAST LIMITED: CPT | Performed by: INTERNAL MEDICINE

## 2019-07-19 ENCOUNTER — OFFICE VISIT (OUTPATIENT)
Dept: HEMATOLOGY/ONCOLOGY | Facility: HOSPITAL | Age: 74
End: 2019-07-19
Attending: INTERNAL MEDICINE
Payer: MEDICARE

## 2019-07-19 VITALS
WEIGHT: 226.19 LBS | SYSTOLIC BLOOD PRESSURE: 117 MMHG | BODY MASS INDEX: 38.62 KG/M2 | RESPIRATION RATE: 16 BRPM | OXYGEN SATURATION: 96 % | HEART RATE: 82 BPM | TEMPERATURE: 98 F | HEIGHT: 64.25 IN | DIASTOLIC BLOOD PRESSURE: 74 MMHG

## 2019-07-19 DIAGNOSIS — N60.02 CYST OF LEFT BREAST: ICD-10-CM

## 2019-07-19 DIAGNOSIS — C50.312 MALIGNANT NEOPLASM OF LOWER-INNER QUADRANT OF LEFT FEMALE BREAST, UNSPECIFIED ESTROGEN RECEPTOR STATUS (HCC): Primary | ICD-10-CM

## 2019-07-19 PROCEDURE — 99213 OFFICE O/P EST LOW 20 MIN: CPT | Performed by: INTERNAL MEDICINE

## 2019-07-22 ENCOUNTER — TELEPHONE (OUTPATIENT)
Dept: HEMATOLOGY/ONCOLOGY | Facility: HOSPITAL | Age: 74
End: 2019-07-22

## 2019-07-22 NOTE — TELEPHONE ENCOUNTER
LVM for patient to return phone call. Contact information provided. Will await return phone call. When patient calls back will discuss with her that we did further clarify with radiology.  The diagnostic mammogram that she had last week was okay, and she

## 2019-07-23 ENCOUNTER — TELEPHONE (OUTPATIENT)
Dept: HEMATOLOGY/ONCOLOGY | Facility: HOSPITAL | Age: 74
End: 2019-07-23

## 2019-07-23 NOTE — TELEPHONE ENCOUNTER
Spoke with patient regarding imaging. No need for any further imaging at this time. Will be due in 6 months for images. Pt verbalizes understanding.

## 2019-07-24 ENCOUNTER — DOCUMENTATION (OUTPATIENT)
Dept: CARDIOLOGY | Age: 74
End: 2019-07-24

## 2019-07-30 RX ORDER — MONTELUKAST SODIUM 10 MG/1
10 TABLET ORAL
COMMUNITY
Start: 2019-06-24 | End: 2020-03-20

## 2019-07-30 RX ORDER — ALBUTEROL SULFATE 90 UG/1
2 AEROSOL, METERED RESPIRATORY (INHALATION)
COMMUNITY
Start: 2018-12-28 | End: 2020-05-27

## 2019-07-30 NOTE — PROGRESS NOTES
Cancer Center Progress Note  Patient Name: Bernardino Jaffe   YOB: 1945   Medical Record Number: [id number]   CSN: [id number]   Attending Physician: Nadira Hernadez M.D.        Date of Visit: 7/17/19    Chief Complaint:  Patient presents 5/16/2017   • Primary osteoarthritis of right knee 05/16/2017   • Pulmonary embolism ([facility] [address] 09/19/2003    Occurred after a long plane ride ON HRT   • Visual impairment     READERS       Past Surgical History:  Past Surgical History:   Procedure Laterality D activity: Not on file    Lifestyle      Physical activity:        Days per week: Not on file        Minutes per session: Not on file      Stress: Not on file    Relationships      Social connections:        Talks on phone: Not on file        Gets together: mouth daily. , Disp: 30 tablet, Rfl: 3    Allergies:    Sulfa Antibiotics       HIVES, SWELLING  Lisinopril                  Comment:cough  Valsartan               Coughing     Review of Systems:    Constitutional No fevers, chills, night sweats, excessive or edema. Integumentary Normal - No rashes, No Jaundice   Neurologic Normal - No sensory or motor deficits, normal cerebellar function, normal gait, cranial nerves intact. Psychiatric Normal - Alert and oriented times three. Coherent speech.  Verbalize explained to the patient and the family. Electronically Signed by: NATACHA Nuno [facility]

## 2019-07-31 ENCOUNTER — OFFICE VISIT (OUTPATIENT)
Dept: CARDIOLOGY | Age: 74
End: 2019-07-31

## 2019-07-31 VITALS
HEART RATE: 60 BPM | HEIGHT: 66 IN | DIASTOLIC BLOOD PRESSURE: 70 MMHG | SYSTOLIC BLOOD PRESSURE: 128 MMHG | BODY MASS INDEX: 35.84 KG/M2 | WEIGHT: 223 LBS

## 2019-07-31 DIAGNOSIS — Z86.79 HISTORY OF ATRIAL FIBRILLATION: Primary | ICD-10-CM

## 2019-07-31 PROCEDURE — 99214 OFFICE O/P EST MOD 30 MIN: CPT | Performed by: INTERNAL MEDICINE

## 2019-09-09 RX ORDER — AMLODIPINE BESYLATE 5 MG/1
TABLET ORAL
Qty: 90 TABLET | Refills: 0 | Status: SHIPPED | OUTPATIENT
Start: 2019-09-09 | End: 2019-12-06

## 2019-10-07 ENCOUNTER — TELEPHONE (OUTPATIENT)
Dept: FAMILY MEDICINE CLINIC | Facility: CLINIC | Age: 74
End: 2019-10-07

## 2019-10-07 NOTE — TELEPHONE ENCOUNTER
Recommend she call Dr. Ugalde Larger office about the need for antibiotics. My understanding is that the only patients who need antibiotics for dental procedures are patients with serious heart valve problems.   Many dentist do not require patients to stop El

## 2019-10-07 NOTE — TELEPHONE ENCOUNTER
DENTAL APPT WEDNESDAY AT 11AM, DUE TO HER TAKING ELIQUIS SHE IS SUPPOSE TO TAKE AN ANTIBITOIC THE MORNING OF HER APPT, IS SHE SUPPOSE TO STOP TAKING HER ELIQUIS?

## 2019-10-07 NOTE — TELEPHONE ENCOUNTER
Servando Peterson told her she needs abx if she ever has dental appointment. Also does she need to stop the eliquis. On eliquis for A-fib Dental appointment is for routine care.

## 2019-12-05 NOTE — TELEPHONE ENCOUNTER
Addended by: KALIE BRUNO on: 12/5/2019 07:44 AM     Modules accepted: Orders     Patient advised. Patient verbalized understanding. Pt will call back to schedule appointment. Pt will continue to take medication until her appointment.

## 2019-12-06 RX ORDER — AMLODIPINE BESYLATE 5 MG/1
TABLET ORAL
Qty: 90 TABLET | Refills: 0 | Status: SHIPPED | OUTPATIENT
Start: 2019-12-06 | End: 2020-03-13

## 2019-12-06 NOTE — TELEPHONE ENCOUNTER
Last refill #90 on 9/9/19  Last office visit 5/20/19  No future appointments. BP Readings from Last 3 Encounters:  07/19/19 : 117/74  05/20/19 : 140/78  04/08/19 : 138/80    Patient is due for labs and office visit. Reminder letter sent.

## 2019-12-16 DIAGNOSIS — I48.91 ATRIAL FIBRILLATION, UNSPECIFIED TYPE (HCC): ICD-10-CM

## 2019-12-16 DIAGNOSIS — I70.0 AORTIC CALCIFICATION (HCC): ICD-10-CM

## 2019-12-16 DIAGNOSIS — C50.312 MALIGNANT NEOPLASM OF LOWER-INNER QUADRANT OF LEFT FEMALE BREAST, UNSPECIFIED ESTROGEN RECEPTOR STATUS (HCC): Primary | ICD-10-CM

## 2019-12-16 NOTE — PROGRESS NOTES
Dr Shilpi Swenson already placed the orders for the mammogram/US     The current referral placed for Dr Shilpi Swenson should still be good, but with it being a new year I will place a new referral     Will place a referral for Dr Lakhwinder Worrell (A-fib)

## 2020-01-01 ENCOUNTER — EXTERNAL RECORD (OUTPATIENT)
Dept: OTHER | Age: 75
End: 2020-01-01

## 2020-01-28 RX ORDER — METOPROLOL SUCCINATE 50 MG/1
TABLET, EXTENDED RELEASE ORAL
Qty: 90 TABLET | Refills: 3 | Status: SHIPPED | OUTPATIENT
Start: 2020-01-28 | End: 2020-04-06 | Stop reason: DRUGHIGH

## 2020-02-05 ENCOUNTER — HOSPITAL ENCOUNTER (OUTPATIENT)
Dept: MAMMOGRAPHY | Facility: HOSPITAL | Age: 75
Discharge: HOME | End: 2020-02-05
Attending: INTERNAL MEDICINE
Payer: MEDICARE

## 2020-02-05 DIAGNOSIS — N60.02 CYST OF LEFT BREAST: ICD-10-CM

## 2020-02-05 DIAGNOSIS — C50.312 MALIGNANT NEOPLASM OF LOWER-INNER QUADRANT OF LEFT FEMALE BREAST, UNSPECIFIED ESTROGEN RECEPTOR STATUS (HCC): ICD-10-CM

## 2020-02-05 PROCEDURE — 77066 DX MAMMO INCL CAD BI: CPT | Performed by: INTERNAL MEDICINE

## 2020-02-05 PROCEDURE — 77062 BREAST TOMOSYNTHESIS BI: CPT | Performed by: INTERNAL MEDICINE

## 2020-02-06 ENCOUNTER — OFFICE VISIT (OUTPATIENT)
Dept: HEMATOLOGY/ONCOLOGY | Age: 75
End: 2020-02-06
Attending: SPECIALIST
Payer: MEDICARE

## 2020-02-06 VITALS
DIASTOLIC BLOOD PRESSURE: 78 MMHG | WEIGHT: 244.38 LBS | BODY MASS INDEX: 42 KG/M2 | OXYGEN SATURATION: 92 % | SYSTOLIC BLOOD PRESSURE: 126 MMHG | HEART RATE: 68 BPM | RESPIRATION RATE: 20 BRPM | TEMPERATURE: 99 F

## 2020-02-06 DIAGNOSIS — C50.312 MALIGNANT NEOPLASM OF LOWER-INNER QUADRANT OF LEFT FEMALE BREAST, UNSPECIFIED ESTROGEN RECEPTOR STATUS (HCC): Primary | ICD-10-CM

## 2020-02-06 DIAGNOSIS — N60.02 CYST OF LEFT BREAST: ICD-10-CM

## 2020-02-06 PROCEDURE — 99213 OFFICE O/P EST LOW 20 MIN: CPT | Performed by: INTERNAL MEDICINE

## 2020-02-06 NOTE — PROGRESS NOTES
Cancer Center Progress Note  Patient Name: Randolph Rodriguez   YOB: 1945   Medical Record Number: [id number]   CSN: [id number]   Attending Physician: Shabana Meza M.D.        Date of Visit: 2/6/2020    Chief Complaint:  Patient p left knee 5/16/2017   • Primary osteoarthritis of right knee 05/16/2017   • Pulmonary embolism ([facility] [address] 09/19/2003    Occurred after a long plane ride ON HRT   • Visual impairment     READERS       Past Surgical History:  Past Surgical History:   Procedure La drinks      Drug use: No      Sexual activity: Not on file    Lifestyle      Physical activity:        Days per week: Not on file        Minutes per session: Not on file      Stress: Not on file    Relationships      Social connections:        Talks on edu yellowing. Hematologic/Lymphatic No easy bruising or bleeding. Denies tender or palpable lymph nodes. Respiratory No dyspnea, pleuritic chest pain, cough or hemoptysis. Cardiovascular No anginal chest pain, palpitations or orthopnea.    Gastrointesti significant change. Stable surgical clips, oil cyst and benign parenchymal asymmetry in left breast.          Pathology:    Impression and Plan:  Breast cancer: L breast, lower inner quadrant. Stage Ia (P7gB6T6) ER/OR+, HER-2 Neg.  She is s/p lumpectomy an

## 2020-03-13 RX ORDER — AMLODIPINE BESYLATE 5 MG/1
TABLET ORAL
Qty: 90 TABLET | Refills: 0 | Status: SHIPPED | OUTPATIENT
Start: 2020-03-13 | End: 2020-06-15

## 2020-03-13 NOTE — TELEPHONE ENCOUNTER
LOV  05/20/2019    LAST LAB  05/20/2019    LAST RX  12/06/2019  (90 tabs, 0 refill)    Next OV  Future Appointments   Date Time Provider Cristina Hanna   8/6/2020 10:30 AM Angel Carlisle MD PF HEM ONC [place]       PROTOCOL  AMLODIPINE BESYLAT

## 2020-03-23 ENCOUNTER — TELEPHONE (OUTPATIENT)
Dept: CARDIOLOGY | Age: 75
End: 2020-03-23

## 2020-04-01 RX ORDER — APIXABAN 5 MG/1
TABLET, FILM COATED ORAL
Qty: 60 TABLET | Refills: 11 | Status: SHIPPED | OUTPATIENT
Start: 2020-04-01 | End: 2021-03-15 | Stop reason: SDUPTHER

## 2020-04-06 ENCOUNTER — TELEPHONE (OUTPATIENT)
Dept: CARDIOLOGY | Age: 75
End: 2020-04-06

## 2020-04-06 RX ORDER — METOPROLOL SUCCINATE 50 MG/1
75 TABLET, EXTENDED RELEASE ORAL DAILY
COMMUNITY
End: 2020-09-03

## 2020-05-21 ENCOUNTER — TELEPHONE (OUTPATIENT)
Dept: FAMILY MEDICINE CLINIC | Facility: CLINIC | Age: 75
End: 2020-05-21

## 2020-05-26 ENCOUNTER — E-ADVICE (OUTPATIENT)
Dept: CARDIOLOGY | Age: 75
End: 2020-05-26

## 2020-05-27 ENCOUNTER — V-VISIT (OUTPATIENT)
Dept: CARDIOLOGY | Age: 75
End: 2020-05-27

## 2020-05-27 VITALS
SYSTOLIC BLOOD PRESSURE: 119 MMHG | DIASTOLIC BLOOD PRESSURE: 82 MMHG | WEIGHT: 236 LBS | BODY MASS INDEX: 37.93 KG/M2 | TEMPERATURE: 98.3 F | HEART RATE: 67 BPM | HEIGHT: 66 IN

## 2020-05-27 DIAGNOSIS — Z86.79 HISTORY OF ATRIAL FIBRILLATION: Primary | ICD-10-CM

## 2020-05-27 DIAGNOSIS — I44.7 LEFT BUNDLE BRANCH BLOCK (LBBB): ICD-10-CM

## 2020-05-27 DIAGNOSIS — R06.09 DYSPNEA ON EXERTION: ICD-10-CM

## 2020-05-27 DIAGNOSIS — I20.89 OTHER FORMS OF ANGINA PECTORIS: ICD-10-CM

## 2020-05-27 PROBLEM — I25.9 CHEST PAIN DUE TO MYOCARDIAL ISCHEMIA: Status: ACTIVE | Noted: 2020-05-27

## 2020-05-27 PROCEDURE — 99215 OFFICE O/P EST HI 40 MIN: CPT | Performed by: INTERNAL MEDICINE

## 2020-05-27 RX ORDER — VITAMIN B COMPLEX
100 TABLET ORAL DAILY
COMMUNITY
End: 2020-09-16

## 2020-05-27 RX ORDER — RIBOFLAVIN (VITAMIN B2) 100 MG
100 TABLET ORAL DAILY
COMMUNITY
End: 2020-12-02

## 2020-05-27 RX ORDER — VITAMIN B COMPLEX
25 TABLET ORAL DAILY
COMMUNITY
End: 2020-12-02

## 2020-05-27 ASSESSMENT — PATIENT HEALTH QUESTIONNAIRE - PHQ9
SUM OF ALL RESPONSES TO PHQ9 QUESTIONS 1 AND 2: 0
CLINICAL INTERPRETATION OF PHQ9 SCORE: NO FURTHER SCREENING NEEDED
2. FEELING DOWN, DEPRESSED OR HOPELESS: NOT AT ALL
1. LITTLE INTEREST OR PLEASURE IN DOING THINGS: NOT AT ALL
SUM OF ALL RESPONSES TO PHQ9 QUESTIONS 1 AND 2: 0
CLINICAL INTERPRETATION OF PHQ2 SCORE: NO FURTHER SCREENING NEEDED

## 2020-05-28 ENCOUNTER — ORDER TRANSCRIPTION (OUTPATIENT)
Dept: ADMINISTRATIVE | Facility: HOSPITAL | Age: 75
End: 2020-05-28

## 2020-05-28 ENCOUNTER — TELEPHONE (OUTPATIENT)
Dept: CARDIOLOGY | Age: 75
End: 2020-05-28

## 2020-05-29 ENCOUNTER — TELEPHONE (OUTPATIENT)
Dept: CARDIOLOGY | Age: 75
End: 2020-05-29

## 2020-05-29 DIAGNOSIS — Z01.812 PRE-PROCEDURE LAB EXAM: Primary | ICD-10-CM

## 2020-05-30 ENCOUNTER — LAB ENCOUNTER (OUTPATIENT)
Dept: LAB | Facility: HOSPITAL | Age: 75
End: 2020-05-30
Attending: FAMILY MEDICINE
Payer: MEDICARE

## 2020-05-30 DIAGNOSIS — Z01.812 PRE-PROCEDURE LAB EXAM: ICD-10-CM

## 2020-06-01 ENCOUNTER — HOSPITAL ENCOUNTER (OUTPATIENT)
Dept: CV DIAGNOSTICS | Age: 75
Discharge: HOME | End: 2020-06-01
Attending: INTERNAL MEDICINE
Payer: MEDICARE

## 2020-06-01 DIAGNOSIS — R06.00 DYSPNEA ON EXERTION: ICD-10-CM

## 2020-06-01 DIAGNOSIS — Z86.79 HISTORY OF ATRIAL FIBRILLATION: ICD-10-CM

## 2020-06-01 DIAGNOSIS — I44.7 LBBB (LEFT BUNDLE BRANCH BLOCK): ICD-10-CM

## 2020-06-01 DIAGNOSIS — I20.8 OTHER FORMS OF ANGINA PECTORIS (HCC): ICD-10-CM

## 2020-06-01 PROCEDURE — 93306 TTE W/DOPPLER COMPLETE: CPT | Performed by: INTERNAL MEDICINE

## 2020-06-02 ENCOUNTER — TELEPHONE (OUTPATIENT)
Dept: FAMILY MEDICINE CLINIC | Facility: CLINIC | Age: 75
End: 2020-06-02

## 2020-06-02 ENCOUNTER — OFFICE VISIT (OUTPATIENT)
Dept: FAMILY MEDICINE CLINIC | Facility: CLINIC | Age: 75
End: 2020-06-02
Payer: MEDICARE

## 2020-06-02 VITALS
HEIGHT: 64.5 IN | HEART RATE: 72 BPM | BODY MASS INDEX: 40.18 KG/M2 | TEMPERATURE: 98 F | SYSTOLIC BLOOD PRESSURE: 120 MMHG | OXYGEN SATURATION: 97 % | DIASTOLIC BLOOD PRESSURE: 78 MMHG | WEIGHT: 238.25 LBS

## 2020-06-02 DIAGNOSIS — I48.0 PAROXYSMAL ATRIAL FIBRILLATION (HCC): ICD-10-CM

## 2020-06-02 DIAGNOSIS — I10 HYPERTENSION, UNSPECIFIED TYPE: ICD-10-CM

## 2020-06-02 DIAGNOSIS — Z00.00 ENCOUNTER FOR ANNUAL HEALTH EXAMINATION: Primary | ICD-10-CM

## 2020-06-02 DIAGNOSIS — E66.01 SEVERE OBESITY (BMI >= 40) (HCC): ICD-10-CM

## 2020-06-02 DIAGNOSIS — C50.919 MALIGNANT NEOPLASM OF FEMALE BREAST, UNSPECIFIED ESTROGEN RECEPTOR STATUS, UNSPECIFIED LATERALITY, UNSPECIFIED SITE OF BREAST (HCC): ICD-10-CM

## 2020-06-02 DIAGNOSIS — E66.01 SEVERE OBESITY (BMI 35.0-39.9) WITH COMORBIDITY (HCC): Chronic | ICD-10-CM

## 2020-06-02 DIAGNOSIS — G47.33 OSA (OBSTRUCTIVE SLEEP APNEA): ICD-10-CM

## 2020-06-02 DIAGNOSIS — I48.91 ATRIAL FIBRILLATION, UNSPECIFIED TYPE (HCC): ICD-10-CM

## 2020-06-02 DIAGNOSIS — H04.129 DRY EYE: ICD-10-CM

## 2020-06-02 DIAGNOSIS — E78.5 HYPERLIPIDEMIA, UNSPECIFIED HYPERLIPIDEMIA TYPE: ICD-10-CM

## 2020-06-02 PROBLEM — I70.0 AORTIC CALCIFICATION (HCC): Status: RESOLVED | Noted: 2019-04-08 | Resolved: 2020-06-02

## 2020-06-02 PROBLEM — I70.0 AORTIC CALCIFICATION: Status: RESOLVED | Noted: 2019-04-08 | Resolved: 2020-06-02

## 2020-06-02 PROCEDURE — 96160 PT-FOCUSED HLTH RISK ASSMT: CPT | Performed by: FAMILY MEDICINE

## 2020-06-02 PROCEDURE — G0439 PPPS, SUBSEQ VISIT: HCPCS | Performed by: FAMILY MEDICINE

## 2020-06-02 PROCEDURE — 99397 PER PM REEVAL EST PAT 65+ YR: CPT | Performed by: FAMILY MEDICINE

## 2020-06-02 RX ORDER — METOPROLOL SUCCINATE 50 MG/1
75 TABLET, EXTENDED RELEASE ORAL EVERY EVENING
Status: ON HOLD | COMMUNITY
End: 2020-08-28

## 2020-06-02 NOTE — TELEPHONE ENCOUNTER
PT WAS SEEN IN OFFICE TODAY FOR MA SUPERVISIT, WANTS TO KNOW 'S RECOMMENDATION ON COVID QUARANTINING DUE TO HER CURRENT SITUATION?

## 2020-06-02 NOTE — PATIENT INSTRUCTIONS
Altaf Hernandez's SCREENING SCHEDULE   Tests on this list are recommended by your physician but may not be covered, or covered at this frequency, by your insurer. Please check with your insurance carrier before scheduling to verify coverage.    [place] often if abnormal Colonoscopy due on 01/26/2026 Update [facility] if applicable    Flex Sigmoidoscopy Screen  Covered every 5 years No results found for this or any previous visit. No flowsheet data found.      Fecal Occult Blood   Covered Annually for this or any previous visit. Please get once after your 65th birthday    Hepatitis B for Moderate/High Risk       No orders found for this or any previous visit.  Medium/high risk factors:   End-stage renal disease   Hemophiliacs who received Factor VIII

## 2020-06-02 NOTE — PROGRESS NOTES
HPI:   Dee Green is a 70year old female who presents for a MA (Medicare Advantage) [facility] (Once per calendar year). Patient complains of sleeping poorly. She feels exhausted throughout the day and has to nap often.   She has never b Dominic Dugan does not currently take aspirin. We discussed the risks and benefits of aspirin therapy.    Dee Green is unable to use daily aspirin therapy For the following reasons:   Patient decided that the risks of aspirin therapy outweigh the zachary mouth 2 (two) times daily.   amoxicillin 500 MG Oral Tab, 4 pills 30-45 minutes prior to dental appointment       MEDICAL INFORMATION:   She  has a past medical history of Allergic rhinitis due to allergen (12/27/2017), Arrhythmia, [facility] (1/6/ denies headaches  PSYCHE: denies depression or anxiety  HEMATOLOGIC: denies hx of anemia  ENDOCRINE: denies thyroid history  ALL/ASTHMA: denies hx of allergy or asthma    EXAM:   /78   Pulse 72   Temp 97.9 °F (36.6 °C) (Tympanic)   Ht 64.5\"   Wt 238 nodes normal   Neurologic: Normal       Vaccination History     Immunization History   Administered Date(s) Administered   • Depo-Medrol 40mg Inj 05/16/2017, 02/13/2018   • FLU VACC High Dose 65 YRS & Older PRSV Free (10159) 01/13/2020   • FLULAVAL 6 month type    Severe obesity (BMI 35.0-39. 9) with comorbidity ([address]         Diet assessment: good     PLAN:  Recommend scheduling a sleep study. She will wait until she finishes the cardiac studies as ordered by Dr. Jessica Aldana.   She will get her blood work done at [facility] results found for this or any previous visit. No flowsheet data found. Pap and Pelvic      Pap: Every 3 yrs age 21-68 or Pap+HPV every 5 yrs age 33-67, age 72 and older at high risk There are no preventive care reminders to display for this patient.  Upd

## 2020-06-04 ENCOUNTER — HOSPITAL ENCOUNTER (OUTPATIENT)
Dept: CV DIAGNOSTICS | Age: 75
Discharge: HOME | End: 2020-06-04
Attending: INTERNAL MEDICINE
Payer: MEDICARE

## 2020-06-04 DIAGNOSIS — I20.8 OTHER FORMS OF ANGINA PECTORIS (HCC): ICD-10-CM

## 2020-06-04 DIAGNOSIS — I44.7 LEFT BUNDLE BRANCH BLOCK (LBBB): ICD-10-CM

## 2020-06-04 DIAGNOSIS — Z86.79 HISTORY OF ATRIAL FIBRILLATION: ICD-10-CM

## 2020-06-04 DIAGNOSIS — R06.00 DOE (DYSPNEA ON EXERTION): ICD-10-CM

## 2020-06-04 PROCEDURE — 93017 CV STRESS TEST TRACING ONLY: CPT | Performed by: INTERNAL MEDICINE

## 2020-06-04 PROCEDURE — 93018 CV STRESS TEST I&R ONLY: CPT | Performed by: INTERNAL MEDICINE

## 2020-06-04 PROCEDURE — 78452 HT MUSCLE IMAGE SPECT MULT: CPT | Performed by: INTERNAL MEDICINE

## 2020-06-09 ENCOUNTER — E-ADVICE (OUTPATIENT)
Dept: CARDIOLOGY | Age: 75
End: 2020-06-09

## 2020-06-10 ENCOUNTER — TELEPHONE (OUTPATIENT)
Dept: FAMILY MEDICINE CLINIC | Facility: CLINIC | Age: 75
End: 2020-06-10

## 2020-06-10 DIAGNOSIS — H04.129 DRY EYE: Primary | ICD-10-CM

## 2020-06-10 NOTE — TELEPHONE ENCOUNTER
Called [employer] Rep back to clarify    Dr Leonarda Phillips is part of Dr [facility] group but also based out of [address] go to [place] to be seen, need to verify that she can see him in [facility]

## 2020-06-10 NOTE — TELEPHONE ENCOUNTER
Nurse Hebert from [facility] states they received a Referral for Dr Meri Cummings Ophthalmologist.  This Dr is out of network for pts insurance, states they received a referral for the same dr in may, and was voided due to Out of Network provider.   Ple

## 2020-06-10 NOTE — TELEPHONE ENCOUNTER
_______________________________________________________  Referred to Provider Information:  Provider Address Phone   Anne Jerel [address] [facility] [phone]     We can't send External Out of network referrals, these will not

## 2020-06-12 RX ORDER — SIMVASTATIN 20 MG
TABLET ORAL
Qty: 90 TABLET | Refills: 3 | Status: SHIPPED | OUTPATIENT
Start: 2020-06-12 | End: 2021-07-09

## 2020-06-12 NOTE — TELEPHONE ENCOUNTER
LOV:  6/2/2020     LAB:    6/11/2020     LRX:    AMLODIPINE BESYLATE 5 MG Oral Tab 90 tablet 0 refill 3/13/2020    NOV:     Future Appointments   Date Time Provider Cristina Hanna   8/6/2020 10:30 AM Estela Carlisle MD PF HEM ONC [place]

## 2020-06-12 NOTE — TELEPHONE ENCOUNTER
Last OV  6/02/20        Last Refill  SIMVASTATIN 20 mg  # 90 x 3 refill            Last Lab 6/02/20            Cholesterol Medication Protocol Passed6/12 9:45 AM   ALT < 80    ALT resulted within past year    Lipid panel within past 12 months    Appoint

## 2020-06-15 ENCOUNTER — V-VISIT (OUTPATIENT)
Dept: CARDIOLOGY | Age: 75
End: 2020-06-15

## 2020-06-15 VITALS
HEIGHT: 66 IN | DIASTOLIC BLOOD PRESSURE: 73 MMHG | HEART RATE: 67 BPM | BODY MASS INDEX: 37.93 KG/M2 | SYSTOLIC BLOOD PRESSURE: 110 MMHG | WEIGHT: 236 LBS

## 2020-06-15 DIAGNOSIS — R06.09 DYSPNEA ON EXERTION: ICD-10-CM

## 2020-06-15 DIAGNOSIS — I44.7 LEFT BUNDLE BRANCH BLOCK (LBBB): Primary | ICD-10-CM

## 2020-06-15 DIAGNOSIS — Z86.79 HISTORY OF ATRIAL FIBRILLATION: ICD-10-CM

## 2020-06-15 PROCEDURE — 99214 OFFICE O/P EST MOD 30 MIN: CPT | Performed by: INTERNAL MEDICINE

## 2020-06-15 RX ORDER — AMLODIPINE BESYLATE 5 MG/1
TABLET ORAL
Qty: 90 TABLET | Refills: 0 | Status: SHIPPED | OUTPATIENT
Start: 2020-06-15 | End: 2020-08-31

## 2020-06-16 ENCOUNTER — TELEPHONE (OUTPATIENT)
Dept: CARDIOLOGY | Age: 75
End: 2020-06-16

## 2020-06-16 DIAGNOSIS — Z86.79 HISTORY OF ATRIAL FIBRILLATION: Primary | ICD-10-CM

## 2020-06-25 ENCOUNTER — TELEPHONE (OUTPATIENT)
Dept: FAMILY MEDICINE CLINIC | Facility: CLINIC | Age: 75
End: 2020-06-25

## 2020-06-25 NOTE — TELEPHONE ENCOUNTER
GOING FOR SLEEP STUDY TOMORROW NIGHT @ [facility], WANTS TO MAKE SURE IT HAS BEEN APPROVED OR WILL BE COVERED BY HER INS?  PT SAID OUR OFFICE COULD CALL [employer] @ [phone] ABOUT THIS, CALL PT & LET HER KNOW

## 2020-06-25 NOTE — TELEPHONE ENCOUNTER
Status: Authorized Type: Sleep Study   Class: Internal Reasons:     Diagnosis: I10 (ICD-10-CM) - 401.9 (ICD-9-CM) - Hypertension, unspecified type  E66.01 (ICD-10-CM) - 278.01 (ICD-9-CM) - Severe obesity (BMI >= 40) (HCC)  I48.91 (ICD-10-CM) - 427.31 (ICD-

## 2020-07-21 ENCOUNTER — OFFICE VISIT (OUTPATIENT)
Dept: SLEEP CENTER | Age: 75
End: 2020-07-21
Attending: FAMILY MEDICINE
Payer: MEDICARE

## 2020-07-21 DIAGNOSIS — G47.33 OSA (OBSTRUCTIVE SLEEP APNEA): ICD-10-CM

## 2020-07-21 PROCEDURE — 95810 POLYSOM 6/> YRS 4/> PARAM: CPT

## 2020-07-23 ENCOUNTER — SLEEP STUDY (OUTPATIENT)
Dept: FAMILY MEDICINE CLINIC | Facility: CLINIC | Age: 75
End: 2020-07-23
Payer: MEDICARE

## 2020-07-23 DIAGNOSIS — G47.33 OSA (OBSTRUCTIVE SLEEP APNEA): Primary | ICD-10-CM

## 2020-07-23 PROCEDURE — 95810 POLYSOM 6/> YRS 4/> PARAM: CPT | Performed by: FAMILY MEDICINE

## 2020-07-27 ENCOUNTER — TELEPHONE (OUTPATIENT)
Dept: FAMILY MEDICINE CLINIC | Facility: CLINIC | Age: 75
End: 2020-07-27

## 2020-07-27 NOTE — TELEPHONE ENCOUNTER
Flowsheet updated. LM for patient to call back.  Needs to schedule follow up visit to discuss sleep study

## 2020-07-28 NOTE — TELEPHONE ENCOUNTER
Scheduled patient for a Sleep Study FU appt in 8/20. Patient verbalized understanding and has no further questions or concerns.

## 2020-07-29 ENCOUNTER — TELEPHONE (OUTPATIENT)
Dept: CARDIOLOGY | Age: 75
End: 2020-07-29

## 2020-08-04 ENCOUNTER — TELEPHONE (OUTPATIENT)
Dept: FAMILY MEDICINE CLINIC | Facility: CLINIC | Age: 75
End: 2020-08-04

## 2020-08-04 DIAGNOSIS — C50.312 MALIGNANT NEOPLASM OF LOWER-INNER QUADRANT OF LEFT FEMALE BREAST, UNSPECIFIED ESTROGEN RECEPTOR STATUS (HCC): Primary | ICD-10-CM

## 2020-08-04 NOTE — TELEPHONE ENCOUNTER
Referral placed for Dr. Sidney Rodriges. Per Zenobia Gage, supervisor - Dr. Nelda Cantu to place order for cardiac ablation or contact our office directly. Pt advised, verbalized understanding.

## 2020-08-04 NOTE — TELEPHONE ENCOUNTER
Yunior Woodson is calling she needs a referral sent over for Dr. Romana Jaime in [place] she has an appt on 8/6/2020.   Yunior Woodson is having a cardiac ablation done on 8/27/2020 she is unsure if there are any referrals that need to be done for that, Dr. Mikhail Del Angel will

## 2020-08-06 ENCOUNTER — OFFICE VISIT (OUTPATIENT)
Dept: HEMATOLOGY/ONCOLOGY | Age: 75
End: 2020-08-06
Attending: SPECIALIST
Payer: MEDICARE

## 2020-08-06 VITALS
TEMPERATURE: 98 F | OXYGEN SATURATION: 96 % | BODY MASS INDEX: 41 KG/M2 | HEART RATE: 65 BPM | WEIGHT: 242.19 LBS | RESPIRATION RATE: 20 BRPM | SYSTOLIC BLOOD PRESSURE: 119 MMHG | DIASTOLIC BLOOD PRESSURE: 74 MMHG

## 2020-08-06 DIAGNOSIS — Z85.3 HISTORY OF BREAST CANCER: Primary | ICD-10-CM

## 2020-08-06 PROCEDURE — 99214 OFFICE O/P EST MOD 30 MIN: CPT | Performed by: INTERNAL MEDICINE

## 2020-08-06 RX ORDER — MULTIVIT-MIN/IRON FUM/FOLIC AC 7.5 MG-4
1 TABLET ORAL DAILY
COMMUNITY

## 2020-08-06 RX ORDER — MULTIVIT WITH MINERALS/LUTEIN
1000 TABLET ORAL DAILY
COMMUNITY
End: 2020-11-30 | Stop reason: ALTCHOICE

## 2020-08-06 RX ORDER — MULTIVIT,TX WITH IRON,MINERALS
50 TABLET, EXTENDED RELEASE ORAL DAILY
COMMUNITY
End: 2020-11-30 | Stop reason: ALTCHOICE

## 2020-08-06 RX ORDER — BIOTIN 1 MG
1 TABLET ORAL DAILY
COMMUNITY
End: 2020-11-30 | Stop reason: ALTCHOICE

## 2020-08-06 NOTE — PROGRESS NOTES
Cancer Center Progress Note  Patient Name: Keri Neri   YOB: 1945   Medical Record Number: [id number]   CSN: [id number]   Attending Physician: Ruddy Christian M.D.        Date of Visit: 8/6/2020    Chief Complaint:  Patient p 05/16/2017   • Pulmonary embolism ([facility] [address] 09/19/2003    Occurred after a long plane ride ON HRT   • Visual impairment     READERS       Past Surgical History:  Past Surgical History:   Procedure Laterality Date   • CATARACT  10/14/08    Right eye   • CATARAC Lifestyle      Physical activity:        Days per week: Not on file        Minutes per session: Not on file      Stress: Not on file    Relationships      Social connections:        Talks on phone: Not on file        Gets together: Not on file        Atten 500 MG Oral Tab, 4 pills 30-45 minutes prior to dental appointment, Disp: 12 tablet, Rfl: prn    Allergies:    Sulfa Antibiotics       HIVES, SWELLING  Lisinopril                  Comment:cough  Valsartan               Coughing     Review of Systems:    Co Jaundice   Neurologic Normal - No sensory or motor deficits, normal cerebellar function, normal gait, cranial nerves intact. Psychiatric Normal - A&Ox3, Coherent speech. Verbalizes understanding of our discussions today.    Breast    Post-surgical changes

## 2020-08-12 ENCOUNTER — TELEPHONE (OUTPATIENT)
Dept: FAMILY MEDICINE CLINIC | Facility: CLINIC | Age: 75
End: 2020-08-12

## 2020-08-12 DIAGNOSIS — G47.33 OSA (OBSTRUCTIVE SLEEP APNEA): Primary | ICD-10-CM

## 2020-08-12 NOTE — TELEPHONE ENCOUNTER
Pt had sleep study done, she needs a referral for the f/up for the sleep study which is on 8/20. She is having a cryo ablasion done by dr Garcia Prey should she wait for the f/up appt or will she be ok to go to the visit?

## 2020-08-12 NOTE — TELEPHONE ENCOUNTER
Referral placed, pt advised. She is not sure if she should schedule sleep study follow up before or after the ablation. Pt advised to f/u with Dr. Lakhwinder Worrell or Dr. Feliciano Gonzales regarding questions.

## 2020-08-13 ENCOUNTER — TELEPHONE (OUTPATIENT)
Dept: CARDIOLOGY | Age: 75
End: 2020-08-13

## 2020-08-14 ENCOUNTER — E-ADVICE (OUTPATIENT)
Dept: CARDIOLOGY | Age: 75
End: 2020-08-14

## 2020-08-17 ENCOUNTER — HOSPITAL ENCOUNTER (OUTPATIENT)
Dept: CT IMAGING | Facility: HOSPITAL | Age: 75
Discharge: HOME | End: 2020-08-17
Attending: INTERNAL MEDICINE
Payer: MEDICARE

## 2020-08-17 ENCOUNTER — APPOINTMENT (OUTPATIENT)
Dept: LAB | Facility: HOSPITAL | Age: 75
End: 2020-08-17
Attending: INTERNAL MEDICINE
Payer: MEDICARE

## 2020-08-17 VITALS — SYSTOLIC BLOOD PRESSURE: 117 MMHG | DIASTOLIC BLOOD PRESSURE: 61 MMHG | HEART RATE: 58 BPM

## 2020-08-17 DIAGNOSIS — I44.7 LBBB (LEFT BUNDLE BRANCH BLOCK): ICD-10-CM

## 2020-08-17 DIAGNOSIS — R06.00 DOE (DYSPNEA ON EXERTION): ICD-10-CM

## 2020-08-17 DIAGNOSIS — Z86.79 HISTORY OF ATRIAL FIBRILLATION: ICD-10-CM

## 2020-08-17 LAB — CREAT BLD-MCNC: 0.7 MG/DL (ref 0.55–1.02)

## 2020-08-17 PROCEDURE — 75574 CT ANGIO HRT W/3D IMAGE: CPT | Performed by: INTERNAL MEDICINE

## 2020-08-17 PROCEDURE — 82565 ASSAY OF CREATININE: CPT

## 2020-08-17 NOTE — IMAGING NOTE
Pt scheduled for CT gated pulmonary vein. Denies any allergies to contrast. HR 56 during scan. Tolerated exam well. Instructed to drink water.

## 2020-08-19 ENCOUNTER — LAB ENCOUNTER (OUTPATIENT)
Dept: LAB | Facility: HOSPITAL | Age: 75
End: 2020-08-19
Attending: INTERNAL MEDICINE
Payer: MEDICARE

## 2020-08-19 DIAGNOSIS — Z86.79 HX OF ATRIAL FIBRILLATION WITHOUT CURRENT MEDICATION: Primary | ICD-10-CM

## 2020-08-19 LAB
ANION GAP SERPL CALC-SCNC: 5 MMOL/L (ref 0–18)
BASOPHILS # BLD AUTO: 0.07 X10(3) UL (ref 0–0.2)
BASOPHILS NFR BLD AUTO: 1.2 %
BUN BLD-MCNC: 21 MG/DL (ref 7–18)
BUN/CREAT SERPL: 29.6 (ref 10–20)
CALCIUM BLD-MCNC: 9.5 MG/DL (ref 8.5–10.1)
CHLORIDE SERPL-SCNC: 107 MMOL/L (ref 98–112)
CO2 SERPL-SCNC: 27 MMOL/L (ref 21–32)
CREAT BLD-MCNC: 0.71 MG/DL (ref 0.55–1.02)
DEPRECATED RDW RBC AUTO: 48.3 FL (ref 35.1–46.3)
EOSINOPHIL # BLD AUTO: 0.12 X10(3) UL (ref 0–0.7)
EOSINOPHIL NFR BLD AUTO: 2 %
ERYTHROCYTE [DISTWIDTH] IN BLOOD BY AUTOMATED COUNT: 14.7 % (ref 11–15)
GLUCOSE BLD-MCNC: 95 MG/DL (ref 70–99)
HCT VFR BLD AUTO: 42.7 % (ref 35–48)
HGB BLD-MCNC: 14.4 G/DL (ref 12–16)
IMM GRANULOCYTES # BLD AUTO: 0.05 X10(3) UL (ref 0–1)
IMM GRANULOCYTES NFR BLD: 0.9 %
LYMPHOCYTES # BLD AUTO: 2.05 X10(3) UL (ref 1–4)
LYMPHOCYTES NFR BLD AUTO: 35 %
MCH RBC QN AUTO: 30.4 PG (ref 26–34)
MCHC RBC AUTO-ENTMCNC: 33.7 G/DL (ref 31–37)
MCV RBC AUTO: 90.1 FL (ref 80–100)
MONOCYTES # BLD AUTO: 0.49 X10(3) UL (ref 0.1–1)
MONOCYTES NFR BLD AUTO: 8.4 %
NEUTROPHILS # BLD AUTO: 3.08 X10 (3) UL (ref 1.5–7.7)
NEUTROPHILS # BLD AUTO: 3.08 X10(3) UL (ref 1.5–7.7)
NEUTROPHILS NFR BLD AUTO: 52.5 %
OSMOLALITY SERPL CALC.SUM OF ELEC: 291 MOSM/KG (ref 275–295)
PATIENT FASTING Y/N/NP: YES
PLATELET # BLD AUTO: 247 10(3)UL (ref 150–450)
POTASSIUM SERPL-SCNC: 4.1 MMOL/L (ref 3.5–5.1)
RBC # BLD AUTO: 4.74 X10(6)UL (ref 3.8–5.3)
SODIUM SERPL-SCNC: 139 MMOL/L (ref 136–145)
WBC # BLD AUTO: 5.9 X10(3) UL (ref 4–11)

## 2020-08-19 PROCEDURE — 36415 COLL VENOUS BLD VENIPUNCTURE: CPT

## 2020-08-19 PROCEDURE — 80048 BASIC METABOLIC PNL TOTAL CA: CPT

## 2020-08-19 PROCEDURE — 85025 COMPLETE CBC W/AUTO DIFF WBC: CPT

## 2020-08-24 ENCOUNTER — TELEPHONE (OUTPATIENT)
Dept: CARDIOLOGY | Age: 75
End: 2020-08-24

## 2020-08-24 ENCOUNTER — APPOINTMENT (OUTPATIENT)
Dept: LAB | Age: 75
End: 2020-08-24
Attending: INTERNAL MEDICINE
Payer: MEDICARE

## 2020-08-24 DIAGNOSIS — I48.91 A-FIB (HCC): ICD-10-CM

## 2020-08-25 NOTE — HISTORICAL OFFICE NOTE
Progress Notes  - documented in this encounter  Flavio Velasco MD - 06/15/2020 2:00 PM CDT  Formatting of this note might be different from the original.  [facility] [place] [facility] Specialists/[facility]  Clinic Note    James Omer  : 1945  PCP: [facility] block.    25 minutes spent reviewing diagnosis and treatment plan. All questions answered to patients satisfaction. Follow up: 4-6 months    History of Present Illness:  Benjamin Whitmore is a 70year old woman with PMHx of PMHx of TKA, had post op AF.  No Valsartan Cough   Oral,     Medications:  Current Outpatient Medications   Medication Sig Dispense Refill   • coenzyme Q10 100 MG capsule Take 100 mg by mouth daily. • cholecalciferol (VITAMIN D) 25 mcg (1,000 units) tablet Take 25 mcg by mouth daily.

## 2020-08-26 LAB
SARS-COV-2 RNA RESP QL NAA+PROBE: NOT DETECTED
SARS-COV-2 RNA SPEC QL NAA+PROBE: NOT DETECTED
SPECIMEN SOURCE: NORMAL

## 2020-08-27 ENCOUNTER — HOSPITAL ENCOUNTER (OUTPATIENT)
Dept: INTERVENTIONAL RADIOLOGY/VASCULAR | Facility: HOSPITAL | Age: 75
Discharge: HOME | End: 2020-08-28
Attending: INTERNAL MEDICINE | Admitting: INTERNAL MEDICINE
Payer: MEDICARE

## 2020-08-27 DIAGNOSIS — I48.91 A-FIB (HCC): Primary | ICD-10-CM

## 2020-08-27 DIAGNOSIS — I48.91 ATRIAL FIBRILLATION, UNSPECIFIED TYPE (HCC): ICD-10-CM

## 2020-08-27 LAB
ISTAT ACTIVATED CLOTTING TIME: 213 SECONDS (ref 74–137)
ISTAT ACTIVATED CLOTTING TIME: 230 SECONDS (ref 74–137)

## 2020-08-27 PROCEDURE — 02583ZZ DESTRUCTION OF CONDUCTION MECHANISM, PERCUTANEOUS APPROACH: ICD-10-PCS | Performed by: INTERNAL MEDICINE

## 2020-08-27 PROCEDURE — 99152 MOD SED SAME PHYS/QHP 5/>YRS: CPT | Performed by: INTERNAL MEDICINE

## 2020-08-27 PROCEDURE — 93613 INTRACARDIAC EPHYS 3D MAPG: CPT

## 2020-08-27 PROCEDURE — 93010 ELECTROCARDIOGRAM REPORT: CPT | Performed by: INTERNAL MEDICINE

## 2020-08-27 PROCEDURE — 93662 INTRACARDIAC ECG (ICE): CPT | Performed by: INTERNAL MEDICINE

## 2020-08-27 PROCEDURE — 93662 INTRACARDIAC ECG (ICE): CPT

## 2020-08-27 PROCEDURE — 93005 ELECTROCARDIOGRAM TRACING: CPT

## 2020-08-27 PROCEDURE — B244YZZ ULTRASONOGRAPHY OF RIGHT HEART USING OTHER CONTRAST: ICD-10-PCS | Performed by: INTERNAL MEDICINE

## 2020-08-27 PROCEDURE — 99152 MOD SED SAME PHYS/QHP 5/>YRS: CPT

## 2020-08-27 PROCEDURE — 93656 COMPRE EP EVAL ABLTJ ATR FIB: CPT

## 2020-08-27 PROCEDURE — 4A023FZ MEASUREMENT OF CARDIAC RHYTHM, PERCUTANEOUS APPROACH: ICD-10-PCS | Performed by: INTERNAL MEDICINE

## 2020-08-27 PROCEDURE — 93613 INTRACARDIAC EPHYS 3D MAPG: CPT | Performed by: INTERNAL MEDICINE

## 2020-08-27 PROCEDURE — 02K83ZZ MAP CONDUCTION MECHANISM, PERCUTANEOUS APPROACH: ICD-10-PCS | Performed by: INTERNAL MEDICINE

## 2020-08-27 PROCEDURE — 4A0234Z MEASUREMENT OF CARDIAC ELECTRICAL ACTIVITY, PERCUTANEOUS APPROACH: ICD-10-PCS | Performed by: INTERNAL MEDICINE

## 2020-08-27 PROCEDURE — 85347 COAGULATION TIME ACTIVATED: CPT

## 2020-08-27 PROCEDURE — 99153 MOD SED SAME PHYS/QHP EA: CPT

## 2020-08-27 PROCEDURE — B24CYZZ ULTRASONOGRAPHY OF PERICARDIUM USING OTHER CONTRAST: ICD-10-PCS | Performed by: INTERNAL MEDICINE

## 2020-08-27 PROCEDURE — 93656 COMPRE EP EVAL ABLTJ ATR FIB: CPT | Performed by: INTERNAL MEDICINE

## 2020-08-27 RX ORDER — LIDOCAINE HYDROCHLORIDE 10 MG/ML
INJECTION, SOLUTION EPIDURAL; INFILTRATION; INTRACAUDAL; PERINEURAL
Status: COMPLETED
Start: 2020-08-27 | End: 2020-08-27

## 2020-08-27 RX ORDER — AMLODIPINE BESYLATE 5 MG/1
5 TABLET ORAL DAILY
Status: DISCONTINUED | OUTPATIENT
Start: 2020-08-28 | End: 2020-08-28

## 2020-08-27 RX ORDER — SOTALOL HYDROCHLORIDE 80 MG/1
80 TABLET ORAL EVERY 12 HOURS SCHEDULED
Status: DISCONTINUED | OUTPATIENT
Start: 2020-08-27 | End: 2020-08-28

## 2020-08-27 RX ORDER — HEPARIN SODIUM 5000 [USP'U]/ML
INJECTION, SOLUTION INTRAVENOUS; SUBCUTANEOUS
Status: COMPLETED
Start: 2020-08-27 | End: 2020-08-27

## 2020-08-27 RX ORDER — CHLORHEXIDINE GLUCONATE 4 G/100ML
30 SOLUTION TOPICAL
Status: DISCONTINUED | OUTPATIENT
Start: 2020-08-28 | End: 2020-08-27 | Stop reason: HOSPADM

## 2020-08-27 RX ORDER — PROTAMINE SULFATE 10 MG/ML
INJECTION, SOLUTION INTRAVENOUS
Status: COMPLETED
Start: 2020-08-27 | End: 2020-08-27

## 2020-08-27 RX ORDER — MIDAZOLAM HYDROCHLORIDE 1 MG/ML
INJECTION INTRAMUSCULAR; INTRAVENOUS
Status: COMPLETED
Start: 2020-08-27 | End: 2020-08-27

## 2020-08-27 RX ORDER — SODIUM CHLORIDE 9 MG/ML
INJECTION, SOLUTION INTRAVENOUS
Status: DISCONTINUED | OUTPATIENT
Start: 2020-08-28 | End: 2020-08-27 | Stop reason: HOSPADM

## 2020-08-27 RX ORDER — ATORVASTATIN CALCIUM 10 MG/1
10 TABLET, FILM COATED ORAL NIGHTLY
Status: DISCONTINUED | OUTPATIENT
Start: 2020-08-27 | End: 2020-08-28

## 2020-08-27 RX ADMIN — ATORVASTATIN CALCIUM 10 MG: 10 TABLET, FILM COATED ORAL at 21:07:00

## 2020-08-27 RX ADMIN — SOTALOL HYDROCHLORIDE 80 MG: 80 TABLET ORAL at 21:07:00

## 2020-08-27 NOTE — PROCEDURES
[facility]   Procedure Note    Zoya Matthew Location: Cath Lab   CSN [id number] MRN [id number]   Admission Date 8/27/2020 Operation Date 8/27/2020   Attending Physician Diane Gleason MD Operating Physician Daniela Cota MD     Pre-Operat within the interatrial septum. The needle was advanced   through the fossa ovalis, this was confirmed by injecting IV contrast,  and visualizing it by echocardiography.  The dilator   and sheath were gently advanced over the needle followed by removal of th 140 ms,  ms. AH 80 ms, HV 45 ms. CONCLUSIONS:   1. Sinus node function normal.   2. Atrioventricular node function normal.   3. His-Purkinje function normal.   4. Pulmonary vein isolation was achieved with cryoablation as   described above.    5

## 2020-08-27 NOTE — H&P
[facility]  H&P    Konrad Mccallum Patient Status:  Outpatient in a Bed    1945 MRN [id number]   [facility] 2NE-A Attending Stephanie Brandon MD   Hosp Day # 0 PCP DO Rosa Richardson Performed by Bryn Mcgraw MD at [facility] MAIN OR   • LUMPECTOMY LEFT  2/3/16    L breast lumpectomy w/sentinel lymph node biopsy and lymph node dissection   • OTHER SURGICAL HISTORY  11/19/08    Oral surgery-2 cyst and 2 wisdom teeth   • OTHER SURGICAL HIST effort. Abdomen: Soft, non-tender. Extremities: Without clubbing, cyanosis or edema. Peripheral pulses are 2+. Neurologic: Alert and oriented, normal affect. Skin: Warm and dry.      Laboratories and Data:  Diagnostics:    Labs:        No results foun

## 2020-08-27 NOTE — PLAN OF CARE
Neuro: AOx4  Resp: CTA bilaterally. Room air. No cough. Cardiac: NSR on tele. Pedal pulses +2. No edema. GI: WDL  : WDL    Skin: right groin figure 8 stitch in place. POC: Discharge home tomorrow.       Problem: Patient/Family Goals  Goal: Patient/Fa ulcer development  - Assess and document skin integrity  - Assess and document dressing/incision, wound bed, drain sites and surrounding tissue  - Implement wound care per orders  - Initiate isolation precautions as appropriate  - Initiate Pressure Ulcer p

## 2020-08-28 VITALS
HEART RATE: 69 BPM | HEIGHT: 64.5 IN | RESPIRATION RATE: 18 BRPM | OXYGEN SATURATION: 93 % | DIASTOLIC BLOOD PRESSURE: 67 MMHG | TEMPERATURE: 98 F | BODY MASS INDEX: 39.63 KG/M2 | WEIGHT: 235 LBS | SYSTOLIC BLOOD PRESSURE: 132 MMHG

## 2020-08-28 LAB
ATRIAL RATE: 63 BPM
ATRIAL RATE: 66 BPM
P AXIS: 79 DEGREES
P AXIS: 88 DEGREES
P-R INTERVAL: 256 MS
P-R INTERVAL: 258 MS
Q-T INTERVAL: 474 MS
Q-T INTERVAL: 480 MS
QRS DURATION: 168 MS
QRS DURATION: 170 MS
QTC CALCULATION (BEZET): 485 MS
QTC CALCULATION (BEZET): 503 MS
R AXIS: -26 DEGREES
R AXIS: 191 DEGREES
T AXIS: 142 DEGREES
T AXIS: 93 DEGREES
VENTRICULAR RATE: 63 BPM
VENTRICULAR RATE: 66 BPM

## 2020-08-28 PROCEDURE — 99217 OBSERVATION CARE DISCHARGE: CPT | Performed by: INTERNAL MEDICINE

## 2020-08-28 PROCEDURE — 93010 ELECTROCARDIOGRAM REPORT: CPT | Performed by: INTERNAL MEDICINE

## 2020-08-28 PROCEDURE — 93005 ELECTROCARDIOGRAM TRACING: CPT

## 2020-08-28 RX ORDER — ACETAMINOPHEN 325 MG/1
650 TABLET ORAL EVERY 6 HOURS PRN
Status: DISCONTINUED | OUTPATIENT
Start: 2020-08-28 | End: 2020-08-28

## 2020-08-28 RX ORDER — SOTALOL HYDROCHLORIDE 80 MG/1
80 TABLET ORAL EVERY 12 HOURS SCHEDULED
Qty: 60 TABLET | Refills: 3 | Status: SHIPPED | OUTPATIENT
Start: 2020-08-28 | End: 2020-09-10 | Stop reason: ALTCHOICE

## 2020-08-28 RX ADMIN — SOTALOL HYDROCHLORIDE 80 MG: 80 TABLET ORAL at 09:52:00

## 2020-08-28 RX ADMIN — AMLODIPINE BESYLATE 5 MG: 5 TABLET ORAL at 09:52:00

## 2020-08-28 RX ADMIN — ACETAMINOPHEN 650 MG: 325 TABLET ORAL at 03:07:00

## 2020-08-28 NOTE — PROGRESS NOTES
[facility]  Cardiology Progress Note    Subjective:  No chest pain or shortness of breath.     Objective:  /47 (BP Location: Right arm)   Pulse 72   Temp 98.1 °F (36.7 °C) (Oral)   Resp 20   Ht 5' 4.5\" (1.638 m)   Wt 235 lb (106.6 kg)   LMP  (LM

## 2020-08-28 NOTE — PLAN OF CARE
Assumed care of pt @2330. Pt axox4. Denies chest pain, groin pain or sob. Sinus rhythm on tele. Right groin with figure 8 stitch and tegaderm. Site c/d/I. Site soft. Pedal pulses +2. Lungs clear. O2 sat on RA >90%.  When pt fell asleep O2 sat in the 80's on document dressing/incision, wound bed, drain sites and surrounding tissue  - Implement wound care per orders  - Initiate isolation precautions as appropriate  - Initiate Pressure Ulcer prevention bundle as indicated  Outcome: Progressing

## 2020-08-28 NOTE — PLAN OF CARE
Received pt from previous shift- H/O A-fib- elective cryoablation- rt groin puncture site with fig 8 suture- dressing c/d/i- area soft- no bleeding/hematoma noted. Pt alert x 3- tele NSR. Vital signs stable- lung sound diminished.  Respirations normal. Sat

## 2020-08-28 NOTE — PLAN OF CARE
Patient alert and oriented x4. Vital signs stable, NSR on telemetry with BBB. Saturating at 96% on room air. R groin site clean dry and intact, soft with no hematoma. Patient ambulating and tolerating well.  Itching noted to R upper back, large square red m Progressing     Problem: SKIN/TISSUE INTEGRITY - ADULT  Goal: Incision(s), wounds(s) or drain site(s) healing without S/S of infection  Description  INTERVENTIONS:  - Assess and document risk factors for pressure ulcer development  - Assess and document sk

## 2020-08-28 NOTE — PROGRESS NOTES
IV and tele discontinued. Reviewed discharge paperwork and medications with patient. Written prescriptions handed to patient. Questions addressed and answered. Patient to be transported via wheelchair.

## 2020-08-31 RX ORDER — AMLODIPINE BESYLATE 5 MG/1
TABLET ORAL
Qty: 90 TABLET | Refills: 0 | Status: SHIPPED | OUTPATIENT
Start: 2020-08-31 | End: 2020-12-09

## 2020-08-31 NOTE — TELEPHONE ENCOUNTER
Last refill #90 on 6/15/2020  Last office visit on 6/2/2020  Future Appointments   Date Time Provider Cristina Cyndi   9/21/2020  1:40 PM Dianne Hood MD [facility] [place] [facility] [place]     BP Readings from Last 3 Encounters:  08/28/20 : 132/67  08/17/20 :

## 2020-09-01 ENCOUNTER — TELEPHONE (OUTPATIENT)
Dept: FAMILY MEDICINE CLINIC | Facility: CLINIC | Age: 75
End: 2020-09-01

## 2020-09-01 NOTE — TELEPHONE ENCOUNTER
Spoke with pt. She wants to see Dr. Shanell Morgan instead of Rafaela to discuss sleep study. Scheduled appt.   Future Appointments   Date Time Provider Cristina Hanna   9/12/2020 10:00 AM Joby Tsai MD [facility] [place] [facility]

## 2020-09-01 NOTE — TELEPHONE ENCOUNTER
Future Appointments   Date Time Provider Cristina Hanna   9/21/2020  1:40 PM Donis Jaimes MD [facility] [place] [facility] [place]     Had a procedure @ [facility] recently and they had to give her oxygen.   Surgeon told her due to her MARY LOU, to follow-up with Dr Martha Bejarano soon

## 2020-09-03 ENCOUNTER — HOSPITAL ENCOUNTER (OUTPATIENT)
Dept: GENERAL RADIOLOGY | Facility: HOSPITAL | Age: 75
Discharge: HOME | End: 2020-09-03
Attending: NURSE PRACTITIONER
Payer: MEDICARE

## 2020-09-03 ENCOUNTER — LAB ENCOUNTER (OUTPATIENT)
Dept: LAB | Facility: HOSPITAL | Age: 75
End: 2020-09-03
Attending: NURSE PRACTITIONER
Payer: MEDICARE

## 2020-09-03 ENCOUNTER — OFFICE VISIT (OUTPATIENT)
Dept: CARDIOLOGY | Age: 75
End: 2020-09-03

## 2020-09-03 VITALS
BODY MASS INDEX: 40.98 KG/M2 | HEIGHT: 65 IN | DIASTOLIC BLOOD PRESSURE: 68 MMHG | HEART RATE: 76 BPM | WEIGHT: 246 LBS | SYSTOLIC BLOOD PRESSURE: 120 MMHG

## 2020-09-03 DIAGNOSIS — R06.00 DYSPNEA ON EXERTION: ICD-10-CM

## 2020-09-03 DIAGNOSIS — Z98.890 H/O CARDIAC RADIOFREQUENCY ABLATION: ICD-10-CM

## 2020-09-03 DIAGNOSIS — I48.0 PAROXYSMAL ATRIAL FIBRILLATION (CMD): ICD-10-CM

## 2020-09-03 DIAGNOSIS — R06.09 DYSPNEA ON EXERTION: Primary | ICD-10-CM

## 2020-09-03 DIAGNOSIS — R06.00 DYSPNEA, PAROXYSMAL NOCTURNAL: Primary | ICD-10-CM

## 2020-09-03 DIAGNOSIS — Z98.890 PERSONAL HISTORY OF SURGERY TO HEART AND GREAT VESSELS, PRESENTING HAZARDS TO HEALTH: ICD-10-CM

## 2020-09-03 LAB
BASOPHILS # BLD AUTO: 0.09 X10(3) UL (ref 0–0.2)
BASOPHILS NFR BLD AUTO: 1.3 %
DEPRECATED RDW RBC AUTO: 49.1 FL (ref 35.1–46.3)
EOSINOPHIL # BLD AUTO: 0.26 X10(3) UL (ref 0–0.7)
EOSINOPHIL NFR BLD AUTO: 3.8 %
ERYTHROCYTE [DISTWIDTH] IN BLOOD BY AUTOMATED COUNT: 14.6 % (ref 11–15)
HCT VFR BLD AUTO: 43.3 % (ref 35–48)
HGB BLD-MCNC: 14.2 G/DL (ref 12–16)
IMM GRANULOCYTES # BLD AUTO: 0.02 X10(3) UL (ref 0–1)
IMM GRANULOCYTES NFR BLD: 0.3 %
LYMPHOCYTES # BLD AUTO: 2.39 X10(3) UL (ref 1–4)
LYMPHOCYTES NFR BLD AUTO: 35.1 %
MCH RBC QN AUTO: 29.9 PG (ref 26–34)
MCHC RBC AUTO-ENTMCNC: 32.8 G/DL (ref 31–37)
MCV RBC AUTO: 91.2 FL (ref 80–100)
MONOCYTES # BLD AUTO: 0.7 X10(3) UL (ref 0.1–1)
MONOCYTES NFR BLD AUTO: 10.3 %
NEUTROPHILS # BLD AUTO: 3.35 X10 (3) UL (ref 1.5–7.7)
NEUTROPHILS # BLD AUTO: 3.35 X10(3) UL (ref 1.5–7.7)
NEUTROPHILS NFR BLD AUTO: 49.2 %
NT-PROBNP SERPL-MCNC: 1179 PG/ML (ref ?–125)
PLATELET # BLD AUTO: 271 10(3)UL (ref 150–450)
RBC # BLD AUTO: 4.75 X10(6)UL (ref 3.8–5.3)
WBC # BLD AUTO: 6.8 X10(3) UL (ref 4–11)

## 2020-09-03 PROCEDURE — 85025 COMPLETE CBC W/AUTO DIFF WBC: CPT

## 2020-09-03 PROCEDURE — 83880 ASSAY OF NATRIURETIC PEPTIDE: CPT

## 2020-09-03 PROCEDURE — 99214 OFFICE O/P EST MOD 30 MIN: CPT | Performed by: NURSE PRACTITIONER

## 2020-09-03 PROCEDURE — 36415 COLL VENOUS BLD VENIPUNCTURE: CPT

## 2020-09-03 PROCEDURE — 93000 ELECTROCARDIOGRAM COMPLETE: CPT | Performed by: NURSE PRACTITIONER

## 2020-09-03 PROCEDURE — 71046 X-RAY EXAM CHEST 2 VIEWS: CPT | Performed by: NURSE PRACTITIONER

## 2020-09-03 RX ORDER — FUROSEMIDE 20 MG/1
20 TABLET ORAL DAILY
Qty: 30 TABLET | Refills: 0 | Status: SHIPPED | OUTPATIENT
Start: 2020-09-03 | End: 2020-12-02

## 2020-09-03 RX ORDER — SOTALOL HYDROCHLORIDE 80 MG/1
80 TABLET ORAL 2 TIMES DAILY
COMMUNITY
Start: 2020-08-28 | End: 2020-09-03 | Stop reason: ALTCHOICE

## 2020-09-03 SDOH — HEALTH STABILITY: MENTAL HEALTH: HOW OFTEN DO YOU HAVE A DRINK CONTAINING ALCOHOL?: NEVER

## 2020-09-03 ASSESSMENT — PATIENT HEALTH QUESTIONNAIRE - PHQ9
CLINICAL INTERPRETATION OF PHQ2 SCORE: NO FURTHER SCREENING NEEDED
1. LITTLE INTEREST OR PLEASURE IN DOING THINGS: NOT AT ALL
CLINICAL INTERPRETATION OF PHQ9 SCORE: NO FURTHER SCREENING NEEDED
SUM OF ALL RESPONSES TO PHQ9 QUESTIONS 1 AND 2: 0
2. FEELING DOWN, DEPRESSED OR HOPELESS: NOT AT ALL
SUM OF ALL RESPONSES TO PHQ9 QUESTIONS 1 AND 2: 0

## 2020-09-03 ASSESSMENT — ENCOUNTER SYMPTOMS
DIAPHORESIS: 0
NEUROLOGICAL NEGATIVE: 1
SYNCOPE: 0
WEIGHT LOSS: 0
DECREASED APPETITE: 0
GASTROINTESTINAL NEGATIVE: 1
SHORTNESS OF BREATH: 1
WEIGHT GAIN: 0

## 2020-09-04 ENCOUNTER — TELEPHONE (OUTPATIENT)
Dept: CARDIOLOGY | Age: 75
End: 2020-09-04

## 2020-09-04 ENCOUNTER — MED REC SCAN ONLY (OUTPATIENT)
Dept: FAMILY MEDICINE CLINIC | Facility: CLINIC | Age: 75
End: 2020-09-04

## 2020-09-08 ENCOUNTER — APPOINTMENT (OUTPATIENT)
Dept: LAB | Facility: HOSPITAL | Age: 75
End: 2020-09-08
Attending: INTERNAL MEDICINE
Payer: MEDICARE

## 2020-09-08 DIAGNOSIS — I48.91 A-FIB (HCC): ICD-10-CM

## 2020-09-08 LAB
SARS-COV-2 RNA SPEC QL NAA+PROBE: NOT DETECTED
SPECIMEN SOURCE: NORMAL

## 2020-09-09 ENCOUNTER — TELEPHONE (OUTPATIENT)
Dept: CARDIOLOGY | Age: 75
End: 2020-09-09

## 2020-09-09 NOTE — HISTORICAL OFFICE NOTE
Progress Notes  - documented in this encounter  Sharda Bell CNP - 2020 10:00 AM CDT  Formatting of this note might be different from the original.  [address]  Cardiovascular Clinic Note    Mac Feldman  : 1945  PCP: Lea Bell Comments)   CLASS   • Valsartan Cough   Oral,     Medications:  Current Outpatient Medications   Medication Sig Dispense Refill   • sotalol (BETAPACE) 80 MG tablet Take 80 mg by mouth 2 times daily.    • coenzyme Q10 100 MG capsule Take 100 mg by mouth joe normal.     Assessment/Plan:  1. Atrial fibrillation status post cryo ablation– EKG today shows atrial fibrillation with controlled ventricular response. Now more dyspneic with exertion– continue present management with Eliquis and sotalol.  We will plan fo

## 2020-09-10 LAB — SARS-COV-2 RNA RESP QL NAA+PROBE: NOT DETECTED

## 2020-09-10 RX ORDER — METOPROLOL SUCCINATE 50 MG/1
75 TABLET, EXTENDED RELEASE ORAL DAILY
Status: ON HOLD | COMMUNITY
End: 2020-09-11

## 2020-09-10 RX ORDER — FUROSEMIDE 20 MG/1
20 TABLET ORAL DAILY PRN
COMMUNITY
End: 2021-11-09

## 2020-09-11 ENCOUNTER — HOSPITAL ENCOUNTER (OUTPATIENT)
Dept: INTERVENTIONAL RADIOLOGY/VASCULAR | Facility: HOSPITAL | Age: 75
Discharge: HOME | End: 2020-09-11
Attending: INTERNAL MEDICINE | Admitting: INTERNAL MEDICINE
Payer: MEDICARE

## 2020-09-11 ENCOUNTER — TELEPHONE (OUTPATIENT)
Dept: CARDIOLOGY | Age: 75
End: 2020-09-11

## 2020-09-11 VITALS
SYSTOLIC BLOOD PRESSURE: 137 MMHG | HEART RATE: 75 BPM | WEIGHT: 235 LBS | RESPIRATION RATE: 18 BRPM | DIASTOLIC BLOOD PRESSURE: 68 MMHG | HEIGHT: 64.5 IN | TEMPERATURE: 97 F | OXYGEN SATURATION: 99 % | BODY MASS INDEX: 39.63 KG/M2

## 2020-09-11 DIAGNOSIS — I48.91 A-FIB (HCC): Primary | ICD-10-CM

## 2020-09-11 DIAGNOSIS — I48.91 ATRIAL FIBRILLATION, UNSPECIFIED TYPE (HCC): ICD-10-CM

## 2020-09-11 LAB
ATRIAL RATE: 64 BPM
P AXIS: 52 DEGREES
P-R INTERVAL: 266 MS
Q-T INTERVAL: 510 MS
QRS DURATION: 170 MS
QTC CALCULATION (BEZET): 526 MS
R AXIS: 265 DEGREES
T AXIS: 99 DEGREES
VENTRICULAR RATE: 64 BPM

## 2020-09-11 PROCEDURE — 93005 ELECTROCARDIOGRAM TRACING: CPT

## 2020-09-11 PROCEDURE — 5A2204Z RESTORATION OF CARDIAC RHYTHM, SINGLE: ICD-10-PCS | Performed by: INTERNAL MEDICINE

## 2020-09-11 PROCEDURE — 92960 CARDIOVERSION ELECTRIC EXT: CPT

## 2020-09-11 PROCEDURE — 92960 CARDIOVERSION ELECTRIC EXT: CPT | Performed by: INTERNAL MEDICINE

## 2020-09-11 PROCEDURE — 93010 ELECTROCARDIOGRAM REPORT: CPT | Performed by: INTERNAL MEDICINE

## 2020-09-11 RX ORDER — METOPROLOL SUCCINATE 50 MG/1
75 TABLET, EXTENDED RELEASE ORAL DAILY
Qty: 30 TABLET | Refills: 0 | Status: SHIPPED | OUTPATIENT
Start: 2020-09-11 | End: 2020-09-11

## 2020-09-11 RX ORDER — SODIUM CHLORIDE 9 MG/ML
INJECTION, SOLUTION INTRAVENOUS CONTINUOUS
Status: DISCONTINUED | OUTPATIENT
Start: 2020-09-11 | End: 2020-09-11

## 2020-09-11 RX ORDER — METOPROLOL SUCCINATE 50 MG/1
75 TABLET, EXTENDED RELEASE ORAL DAILY
Qty: 31 TABLET | Refills: 11 | Status: SHIPPED | OUTPATIENT
Start: 2020-09-11 | End: 2020-11-05

## 2020-09-11 RX ADMIN — SODIUM CHLORIDE: 9 INJECTION, SOLUTION INTRAVENOUS at 09:15:00

## 2020-09-11 NOTE — PROCEDURES
PROCEDURE(S) PERFORMED:    1. Cardioversion. 2.     Sedation     :  Nilya Smith MD     ANESTHESIA:  IV sedation. INDICATION:  Persistent atrial fibrillation. COMPLICATIONS:  None.      METHODS:  The patient was brought to the outpatien

## 2020-09-11 NOTE — H&P
[facility]  H&P    Nayana Saab Patient Status:  Outpatient in a Bed    1945 MRN [id number]   Location [address] [facility] Attending Belgica Johnson MD   Hosp Day # 0 ELLIS Davis KNEE TOTAL REPLACEMENT Right 6/27/2018    Performed by Cornelius Quintero MD at [facility] MAIN OR   • LUMPECTOMY LEFT  2/3/16    L breast lumpectomy w/sentinel lymph node biopsy and lymph node dissection   • OTHER SURGICAL HISTORY  11/19/08    Oral surgery-2 cyst dullness. Normal excursions and effort. Abdomen: Soft, non-tender. Extremities: Without clubbing, cyanosis or edema. Peripheral pulses are 2+. Neurologic: Alert and oriented, normal affect. Skin: Warm and dry.      Laboratories and Data:  Diagnostics

## 2020-09-11 NOTE — PROGRESS NOTES
Pt A&O x 4 and ready to go home. VSS on RA. Successful CV with Dr. Perry Sheridan. Pt received 50 mg of IV Brevital for procedure. She is now fully recovered. Discharge instructions reviewed with pt. All questions answered. IV and tele d/mohsen.  Pt escorted to won

## 2020-09-12 ENCOUNTER — OFFICE VISIT (OUTPATIENT)
Dept: FAMILY MEDICINE CLINIC | Facility: CLINIC | Age: 75
End: 2020-09-12
Payer: MEDICARE

## 2020-09-12 VITALS
RESPIRATION RATE: 18 BRPM | DIASTOLIC BLOOD PRESSURE: 70 MMHG | SYSTOLIC BLOOD PRESSURE: 120 MMHG | WEIGHT: 243 LBS | TEMPERATURE: 97 F | OXYGEN SATURATION: 95 % | HEIGHT: 64.5 IN | BODY MASS INDEX: 40.98 KG/M2 | HEART RATE: 65 BPM

## 2020-09-12 DIAGNOSIS — E66.01 SEVERE OBESITY (BMI 35.0-39.9) WITH COMORBIDITY (HCC): ICD-10-CM

## 2020-09-12 DIAGNOSIS — I48.0 PAROXYSMAL ATRIAL FIBRILLATION (HCC): ICD-10-CM

## 2020-09-12 DIAGNOSIS — G47.33 OSA (OBSTRUCTIVE SLEEP APNEA): Primary | ICD-10-CM

## 2020-09-12 PROCEDURE — 99214 OFFICE O/P EST MOD 30 MIN: CPT | Performed by: FAMILY MEDICINE

## 2020-09-12 PROCEDURE — 3074F SYST BP LT 130 MM HG: CPT | Performed by: FAMILY MEDICINE

## 2020-09-12 PROCEDURE — 3008F BODY MASS INDEX DOCD: CPT | Performed by: FAMILY MEDICINE

## 2020-09-12 PROCEDURE — 3078F DIAST BP <80 MM HG: CPT | Performed by: FAMILY MEDICINE

## 2020-09-12 NOTE — PROGRESS NOTES
[facility]  SLEEP PROGRESS NOTE        HPI:   This is a 70year old female coming in for Patient presents with:  Sleep Problem: sleep study review      HPI:  Patient presents with  to review her sleep study.    Patient with sol 1/6/16    Invasive Ductal Carcinoma-Grade 1   • Calculus of kidney 2009   • Cervix dysplasia     LUZ 3 with laser cone March 2010   • Essential hypertension    • Exposure to medical diagnostic radiation    • High blood pressure    • High cholesterol    • H 4.0 standard drinks      Frequency: Monthly or less      Drinks per session: 1 or 2      Binge frequency: Never    Drug use: No    Family History:  Family History   Problem Relation Age of Onset   • Breast Cancer Self 78   • Stroke Mother    • Heart Attack fibrillation (HCC)     MARY LOU (obstructive sleep apnea)      REVIEW OF SYSTEMS:   Review of Systems   Constitutional: Positive for fatigue. HENT: Negative. Negative for congestion. Eyes: Negative. Respiratory: Positive for apnea.     Cardiovascular: P range of motion. Neck supple. No JVD present. No tracheal deviation present. No thyromegaly present. Cardiovascular: Normal rate, regular rhythm, normal heart sounds and intact distal pulses.    Pulmonary/Chest: Effort normal.   Lymphadenopathy:     She h was created utilizing Dragon speech recognition software.  Please excuse any grammatical errors. Call my office if you have any questions regarding this note.  \"     Vaughn Martel MD  9/12/2020  12:08 PM

## 2020-09-14 ENCOUNTER — TELEPHONE (OUTPATIENT)
Dept: FAMILY MEDICINE CLINIC | Facility: CLINIC | Age: 75
End: 2020-09-14

## 2020-09-14 NOTE — TELEPHONE ENCOUNTER
Spoke with pt. Pt states she already spoke with [employer]. Pt states she will keep her study for 9/22. Message sent to [facility] Department. Pre-Cert pending.

## 2020-09-14 NOTE — TELEPHONE ENCOUNTER
Pre-Auth information for sleep study test  - Going to have it done in [place]  -   Please call  [facility] [employer]  [phone] to recieve the Auth for her sleep study  ( Scheduled for 9/18)

## 2020-09-14 NOTE — TELEPHONE ENCOUNTER
Patient states that she received a call that her appt needs to be r/s, wants to know if she can go to [place]. Would like a call back.

## 2020-09-15 NOTE — TELEPHONE ENCOUNTER
Pt stating that she has requested her medical records from outside facilities. She is wondering if we have yet received? Pt has MD f/u in December. Records not yet received; encouraged pt to call back in a couple of weeks to check.  Pt verbalized underst A-T Advancement Flap Text: The defect edges were debeveled with a #15 scalpel blade.  Given the location of the defect, shape of the defect and the proximity to free margins an A-T advancement flap was deemed most appropriate.  Using a sterile surgical marker, an appropriate advancement flap was drawn incorporating the defect and placing the expected incisions within the relaxed skin tension lines where possible.    The area thus outlined was incised deep to adipose tissue with a #15 scalpel blade.  The skin margins were undermined to an appropriate distance in all directions utilizing iris scissors.

## 2020-09-16 ENCOUNTER — OFFICE VISIT (OUTPATIENT)
Dept: CARDIOLOGY | Age: 75
End: 2020-09-16

## 2020-09-16 VITALS
WEIGHT: 240 LBS | HEIGHT: 64 IN | DIASTOLIC BLOOD PRESSURE: 70 MMHG | BODY MASS INDEX: 40.97 KG/M2 | SYSTOLIC BLOOD PRESSURE: 120 MMHG | HEART RATE: 67 BPM

## 2020-09-16 DIAGNOSIS — I44.7 LEFT BUNDLE BRANCH BLOCK (LBBB): ICD-10-CM

## 2020-09-16 DIAGNOSIS — Z86.79 HISTORY OF ATRIAL FIBRILLATION: ICD-10-CM

## 2020-09-16 DIAGNOSIS — R06.09 DYSPNEA ON EXERTION: Primary | ICD-10-CM

## 2020-09-16 PROCEDURE — 93000 ELECTROCARDIOGRAM COMPLETE: CPT | Performed by: NURSE PRACTITIONER

## 2020-09-16 PROCEDURE — 99214 OFFICE O/P EST MOD 30 MIN: CPT | Performed by: NURSE PRACTITIONER

## 2020-09-16 RX ORDER — AMOXICILLIN 500 MG/1
CAPSULE ORAL
COMMUNITY

## 2020-09-16 RX ORDER — METOPROLOL SUCCINATE 50 MG/1
75 TABLET, EXTENDED RELEASE ORAL
COMMUNITY
Start: 2020-09-11 | End: 2020-10-02 | Stop reason: DRUGHIGH

## 2020-09-16 ASSESSMENT — ENCOUNTER SYMPTOMS
WEIGHT GAIN: 0
SYNCOPE: 0
DIAPHORESIS: 0
WEIGHT LOSS: 0
GASTROINTESTINAL NEGATIVE: 1
SHORTNESS OF BREATH: 0
NEUROLOGICAL NEGATIVE: 1
DECREASED APPETITE: 0

## 2020-09-16 NOTE — TELEPHONE ENCOUNTER
Unsure if referral message was sent to [facility] referral deparment.  Resent to [place] sleep [place] referral department

## 2020-09-19 ENCOUNTER — LAB ENCOUNTER (OUTPATIENT)
Dept: LAB | Age: 75
End: 2020-09-19
Attending: FAMILY MEDICINE
Payer: MEDICARE

## 2020-09-19 DIAGNOSIS — Z01.812 PRE-PROCEDURE LAB EXAM: Primary | ICD-10-CM

## 2020-09-20 LAB — SARS-COV-2 RNA RESP QL NAA+PROBE: NOT DETECTED

## 2020-09-22 ENCOUNTER — OFFICE VISIT (OUTPATIENT)
Dept: SLEEP CENTER | Age: 75
End: 2020-09-22
Attending: FAMILY MEDICINE
Payer: MEDICARE

## 2020-09-22 DIAGNOSIS — E66.01 SEVERE OBESITY (BMI 35.0-39.9) WITH COMORBIDITY (HCC): ICD-10-CM

## 2020-09-22 DIAGNOSIS — G47.33 OSA (OBSTRUCTIVE SLEEP APNEA): ICD-10-CM

## 2020-09-22 DIAGNOSIS — I48.0 PAROXYSMAL ATRIAL FIBRILLATION (HCC): ICD-10-CM

## 2020-09-22 PROCEDURE — 95811 POLYSOM 6/>YRS CPAP 4/> PARM: CPT

## 2020-09-24 DIAGNOSIS — I48.0 PAROXYSMAL ATRIAL FIBRILLATION (CMD): ICD-10-CM

## 2020-09-24 DIAGNOSIS — Z98.890 H/O CARDIAC RADIOFREQUENCY ABLATION: ICD-10-CM

## 2020-09-24 DIAGNOSIS — R06.09 DYSPNEA ON EXERTION: ICD-10-CM

## 2020-09-25 ENCOUNTER — SLEEP STUDY (OUTPATIENT)
Dept: FAMILY MEDICINE CLINIC | Facility: CLINIC | Age: 75
End: 2020-09-25
Payer: MEDICARE

## 2020-09-25 DIAGNOSIS — G47.33 OBSTRUCTIVE SLEEP APNEA SYNDROME: Primary | ICD-10-CM

## 2020-09-25 PROCEDURE — 95811 POLYSOM 6/>YRS CPAP 4/> PARM: CPT | Performed by: FAMILY MEDICINE

## 2020-09-29 ENCOUNTER — TELEPHONE (OUTPATIENT)
Dept: FAMILY MEDICINE CLINIC | Facility: CLINIC | Age: 75
End: 2020-09-29

## 2020-09-29 DIAGNOSIS — G47.33 OSA (OBSTRUCTIVE SLEEP APNEA): Primary | ICD-10-CM

## 2020-09-29 NOTE — TELEPHONE ENCOUNTER
Spoke with patient. [employer] does not cover [facility]. Please place order for CPAP at 14 cm with  Chip.     Flowsheet updated

## 2020-10-01 ENCOUNTER — APPOINTMENT (OUTPATIENT)
Dept: CARDIOLOGY | Age: 75
End: 2020-10-01

## 2020-10-01 ENCOUNTER — TELEPHONE (OUTPATIENT)
Dept: FAMILY MEDICINE CLINIC | Facility: CLINIC | Age: 75
End: 2020-10-01

## 2020-10-01 NOTE — PROGRESS NOTES
[place] PAP at 14  Update Flow sheet. Notify pt to:   Call DME supplier to obtain machine. Recommend routine follow up to assure compliance and efficacy.   Avoid alcohol, sedatives and other cns depressants that may worsen sleep apnea and disrupt norm

## 2020-10-01 NOTE — TELEPHONE ENCOUNTER
----- Message from Hazel Dong MD sent at 10/1/2020  8:51 AM CDT -----  [place] PAP at 14  Update Flow sheet. Notify pt to:   Call DME supplier to obtain machine. Recommend routine follow up to assure compliance and efficacy.   Avoid alcohol, sedati

## 2020-10-02 ENCOUNTER — TELEPHONE (OUTPATIENT)
Dept: CARDIOLOGY | Age: 75
End: 2020-10-02

## 2020-10-02 ENCOUNTER — TELEPHONE (OUTPATIENT)
Dept: FAMILY MEDICINE CLINIC | Facility: CLINIC | Age: 75
End: 2020-10-02

## 2020-10-02 DIAGNOSIS — E66.01 SEVERE OBESITY (BMI 35.0-39.9) WITH COMORBIDITY (HCC): ICD-10-CM

## 2020-10-02 DIAGNOSIS — G47.33 OSA (OBSTRUCTIVE SLEEP APNEA): Primary | ICD-10-CM

## 2020-10-02 DIAGNOSIS — I48.0 PAROXYSMAL ATRIAL FIBRILLATION (HCC): ICD-10-CM

## 2020-10-02 RX ORDER — METOPROLOL SUCCINATE 100 MG/1
100 TABLET, EXTENDED RELEASE ORAL 2 TIMES DAILY
Status: SHIPPED | COMMUNITY
Start: 2020-10-02 | End: 2021-02-01 | Stop reason: SDUPTHER

## 2020-10-02 NOTE — TELEPHONE ENCOUNTER
Pt called now she states they have some concerns with CPAP and now her cardiologist informed her she needs to be on her CPAP as soon as possible. Pt would like a call back ASAP.

## 2020-10-02 NOTE — TELEPHONE ENCOUNTER
Pt notified and verbalized understanding that all paperwork was faxed to [facility] at [phone]

## 2020-10-02 NOTE — TELEPHONE ENCOUNTER
Pts  called, they are unhappy with Judy Srinivasan as their distributor for the CPAP and he would like to talk to someone today about this process. Please c/b.

## 2020-10-05 ENCOUNTER — TELEPHONE (OUTPATIENT)
Dept: FAMILY MEDICINE CLINIC | Facility: CLINIC | Age: 75
End: 2020-10-05

## 2020-10-05 ENCOUNTER — TELEPHONE (OUTPATIENT)
Dept: CARDIOLOGY | Age: 75
End: 2020-10-05

## 2020-10-05 NOTE — TELEPHONE ENCOUNTER
Pt would like a c/b to discuss the process of [employer] and how it works with their insurance. No more info was given.

## 2020-10-05 NOTE — TELEPHONE ENCOUNTER
Spoke with patient. He just wanted to inform us that [facility] may be asking for additional documentation. Informed patient to also call Calvin Heard to see where they are at in the process of getting machine approved.

## 2020-10-06 ENCOUNTER — TELEPHONE (OUTPATIENT)
Dept: CARDIOLOGY | Age: 75
End: 2020-10-06

## 2020-10-07 ENCOUNTER — TELEPHONE (OUTPATIENT)
Dept: FAMILY MEDICINE CLINIC | Facility: CLINIC | Age: 75
End: 2020-10-07

## 2020-10-07 NOTE — TELEPHONE ENCOUNTER
States that patient needs new authorization request for cpap to go to a different company, [address] states that patient tried a company before but it didn't work with them so patient would like to try somewhere else.  States that an authorization was already

## 2020-10-29 ENCOUNTER — PATIENT MESSAGE (OUTPATIENT)
Dept: FAMILY MEDICINE CLINIC | Facility: CLINIC | Age: 75
End: 2020-10-29

## 2020-10-29 DIAGNOSIS — I48.91 ATRIAL FIBRILLATION, UNSPECIFIED TYPE (HCC): Primary | ICD-10-CM

## 2020-10-30 ENCOUNTER — E-ADVICE (OUTPATIENT)
Dept: CARDIOLOGY | Age: 75
End: 2020-10-30

## 2020-10-30 NOTE — TELEPHONE ENCOUNTER
From: Konrad Mccallum  To: Lincoln Seth DO  Sent: 10/29/2020 2:14 PM CDT  Subject: Referral Request    Dr. Ojeda Overcast, I have upcoming visits with Dr. Marilia Cobb and Dr. Junie Bolaños, and I will also need a mammogram. Could you please give me any ref

## 2020-11-05 ENCOUNTER — OFFICE VISIT (OUTPATIENT)
Dept: FAMILY MEDICINE CLINIC | Facility: CLINIC | Age: 75
End: 2020-11-05
Payer: MEDICARE

## 2020-11-05 VITALS
TEMPERATURE: 97 F | SYSTOLIC BLOOD PRESSURE: 126 MMHG | BODY MASS INDEX: 40.47 KG/M2 | DIASTOLIC BLOOD PRESSURE: 70 MMHG | WEIGHT: 240 LBS | HEIGHT: 64.5 IN | OXYGEN SATURATION: 98 % | RESPIRATION RATE: 18 BRPM | HEART RATE: 61 BPM

## 2020-11-05 DIAGNOSIS — G47.33 OSA (OBSTRUCTIVE SLEEP APNEA): Primary | ICD-10-CM

## 2020-11-05 PROCEDURE — 3008F BODY MASS INDEX DOCD: CPT | Performed by: FAMILY MEDICINE

## 2020-11-05 PROCEDURE — 99214 OFFICE O/P EST MOD 30 MIN: CPT | Performed by: FAMILY MEDICINE

## 2020-11-05 PROCEDURE — 3078F DIAST BP <80 MM HG: CPT | Performed by: FAMILY MEDICINE

## 2020-11-05 PROCEDURE — 3074F SYST BP LT 130 MM HG: CPT | Performed by: FAMILY MEDICINE

## 2020-11-30 ENCOUNTER — TELEPHONE (OUTPATIENT)
Dept: FAMILY MEDICINE CLINIC | Facility: CLINIC | Age: 75
End: 2020-11-30

## 2020-11-30 ENCOUNTER — OFFICE VISIT (OUTPATIENT)
Dept: FAMILY MEDICINE CLINIC | Facility: CLINIC | Age: 75
End: 2020-11-30
Payer: MEDICARE

## 2020-11-30 VITALS
SYSTOLIC BLOOD PRESSURE: 138 MMHG | HEIGHT: 64.5 IN | HEART RATE: 70 BPM | DIASTOLIC BLOOD PRESSURE: 88 MMHG | OXYGEN SATURATION: 97 % | TEMPERATURE: 98 F | BODY MASS INDEX: 41.36 KG/M2 | WEIGHT: 245.25 LBS

## 2020-11-30 DIAGNOSIS — M23.92 INTERNAL DERANGEMENT OF LEFT KNEE: Primary | ICD-10-CM

## 2020-11-30 PROCEDURE — 3008F BODY MASS INDEX DOCD: CPT | Performed by: FAMILY MEDICINE

## 2020-11-30 PROCEDURE — 3075F SYST BP GE 130 - 139MM HG: CPT | Performed by: FAMILY MEDICINE

## 2020-11-30 PROCEDURE — 3079F DIAST BP 80-89 MM HG: CPT | Performed by: FAMILY MEDICINE

## 2020-11-30 PROCEDURE — 99213 OFFICE O/P EST LOW 20 MIN: CPT | Performed by: FAMILY MEDICINE

## 2020-11-30 RX ORDER — METOPROLOL SUCCINATE 50 MG/1
50 TABLET, EXTENDED RELEASE ORAL 2 TIMES DAILY
COMMUNITY
End: 2020-12-18

## 2020-11-30 NOTE — TELEPHONE ENCOUNTER
Calling central scheduling to schedule MRI of the LT knee. I spoke with Amara Ozuna. Friday December 4, 2020.  2:15pm in [place]. They are going to go through all of the questions with pt again-I was unable to answer them fully.       Arrive 30 minutes

## 2020-11-30 NOTE — PROGRESS NOTES
Marv Manning is a 70year old female. Patient presents with:   Other: LT knee will go out on pt from time to time-last Thursday night knee would not strainghten out when pt got into bed after a pain episode--like it was locked in place-there fel Invasive Ductal Carcinoma-Grade 1   • Calculus of kidney 2009   • Cervix dysplasia     LUZ 3 with laser cone March 2010   • Essential hypertension    • Exposure to medical diagnostic radiation    • Hyperlipidemia    • Left bundle branch block 11/20/2006 drinks      Frequency: Monthly or less      Drinks per session: 1 or 2      Binge frequency: Never    Drug use: No       REVIEW OF SYSTEMS:   GENERAL HEALTH: feels well otherwise  SKIN: denies any unusual skin lesions or rashes  RESPIRATORY: denies shortne patellofemoral joint space. There is no acute fracture. No loose body. Slight varus deformity on the AP standing   view. Status post right total knee arthroplasty in normal alignment. SOFT TISSUES:  Negative. No visible soft tissue swelling.   EFFUSIO

## 2020-12-01 ENCOUNTER — TELEPHONE (OUTPATIENT)
Dept: FAMILY MEDICINE CLINIC | Facility: CLINIC | Age: 75
End: 2020-12-01

## 2020-12-01 NOTE — TELEPHONE ENCOUNTER
PT. CALLING STATING HER INSURANCE WANTS HER TO VERIFY THE ORDER HAS BEEN SENT TO [facility]. I ADVISED PT. WE USE THE SAME COMPUTER SYSTEM AND THEY WILL BE ABLE TO SEE THE ORDER.  NEPTALI

## 2020-12-01 NOTE — TELEPHONE ENCOUNTER
Noted.    Future Appointments   Date Time Provider Cristina Hanna   12/4/2020  2:15 PM PF MRI RM1 (1.5T) PF MRI [place]   12/18/2020  2:40 PM Minna Zimmer MD [facility] [place] [facility] [place]   1/27/2021 10:00 AM [facility] RM1 [facility]   1/29/2021

## 2020-12-02 ENCOUNTER — OFFICE VISIT (OUTPATIENT)
Dept: CARDIOLOGY | Age: 75
End: 2020-12-02

## 2020-12-02 VITALS
HEART RATE: 61 BPM | BODY MASS INDEX: 40.97 KG/M2 | SYSTOLIC BLOOD PRESSURE: 132 MMHG | DIASTOLIC BLOOD PRESSURE: 74 MMHG | HEIGHT: 64 IN | WEIGHT: 240 LBS

## 2020-12-02 DIAGNOSIS — Z86.79 HISTORY OF ATRIAL FIBRILLATION: Primary | ICD-10-CM

## 2020-12-02 DIAGNOSIS — I44.7 LEFT BUNDLE BRANCH BLOCK (LBBB): ICD-10-CM

## 2020-12-02 DIAGNOSIS — I48.19 PERSISTENT ATRIAL FIBRILLATION (CMD): ICD-10-CM

## 2020-12-02 PROCEDURE — 99214 OFFICE O/P EST MOD 30 MIN: CPT | Performed by: INTERNAL MEDICINE

## 2020-12-02 RX ORDER — SOTALOL HYDROCHLORIDE 80 MG/1
TABLET ORAL
COMMUNITY
Start: 2020-09-23 | End: 2020-12-02

## 2020-12-02 ASSESSMENT — PATIENT HEALTH QUESTIONNAIRE - PHQ9
1. LITTLE INTEREST OR PLEASURE IN DOING THINGS: NOT AT ALL
SUM OF ALL RESPONSES TO PHQ9 QUESTIONS 1 AND 2: 0
SUM OF ALL RESPONSES TO PHQ9 QUESTIONS 1 AND 2: 0
CLINICAL INTERPRETATION OF PHQ2 SCORE: NO FURTHER SCREENING NEEDED
CLINICAL INTERPRETATION OF PHQ9 SCORE: NO FURTHER SCREENING NEEDED
2. FEELING DOWN, DEPRESSED OR HOPELESS: NOT AT ALL

## 2020-12-04 ENCOUNTER — MED REC SCAN ONLY (OUTPATIENT)
Dept: FAMILY MEDICINE CLINIC | Facility: CLINIC | Age: 75
End: 2020-12-04

## 2020-12-04 ENCOUNTER — HOSPITAL ENCOUNTER (OUTPATIENT)
Dept: MRI IMAGING | Age: 75
Discharge: HOME | End: 2020-12-04
Attending: FAMILY MEDICINE
Payer: MEDICARE

## 2020-12-04 DIAGNOSIS — M23.92 INTERNAL DERANGEMENT OF LEFT KNEE: ICD-10-CM

## 2020-12-04 PROCEDURE — 73721 MRI JNT OF LWR EXTRE W/O DYE: CPT | Performed by: FAMILY MEDICINE

## 2020-12-07 ENCOUNTER — TELEPHONE (OUTPATIENT)
Dept: FAMILY MEDICINE CLINIC | Facility: CLINIC | Age: 75
End: 2020-12-07

## 2020-12-07 DIAGNOSIS — S83.282A TEAR OF LATERAL MENISCUS OF LEFT KNEE, UNSPECIFIED TEAR TYPE, UNSPECIFIED WHETHER OLD OR CURRENT TEAR, INITIAL ENCOUNTER: ICD-10-CM

## 2020-12-07 DIAGNOSIS — M23.92 INTERNAL DERANGEMENT OF LEFT KNEE: Primary | ICD-10-CM

## 2020-12-07 NOTE — TELEPHONE ENCOUNTER
Who should she f/u regarding MRI of the knee? Recommended Dr. Laura Fenton, but pt has seen Dr. Dony Combs in the past. Please advise.

## 2020-12-07 NOTE — TELEPHONE ENCOUNTER
Dr. Soham Lamb. I did not have Oksana's chart at the time I got the results and I had forgot she had seen Dr. Soham Lamb.

## 2020-12-07 NOTE — TELEPHONE ENCOUNTER
PT HAD MRI DONE, WAS TOLD IF MRI SHOWED SOMETHING SHE WOULD NEED TO SEE DR Batres Listen, BUT SHE GOT A MESSAGE SAYING SHE NEEDS TO SEE DR Baltazar Givens?  WHO IS SHE SUPPOSED TO SEE?

## 2020-12-09 RX ORDER — AMLODIPINE BESYLATE 5 MG/1
TABLET ORAL
Qty: 90 TABLET | Refills: 0 | Status: SHIPPED | OUTPATIENT
Start: 2020-12-09 | End: 2021-03-08

## 2020-12-09 NOTE — TELEPHONE ENCOUNTER
LOV 11/30/2020    LAST LAB 09/03/2020    LAST RX  Amlodipine #90 R0 08/31/2020    Next OV   Future Appointments   Date Time Provider Cristina Hanna   12/18/2020  2:40 PM Kassandra Washburn MD [facility] [place] [facility] [place]   1/27/2021 10:00 AM [facility] RM1 [facility]

## 2020-12-18 ENCOUNTER — OFFICE VISIT (OUTPATIENT)
Dept: FAMILY MEDICINE CLINIC | Facility: CLINIC | Age: 75
End: 2020-12-18
Payer: MEDICARE

## 2020-12-18 VITALS
RESPIRATION RATE: 16 BRPM | WEIGHT: 240.19 LBS | DIASTOLIC BLOOD PRESSURE: 64 MMHG | HEART RATE: 55 BPM | BODY MASS INDEX: 41.01 KG/M2 | HEIGHT: 64 IN | OXYGEN SATURATION: 97 % | SYSTOLIC BLOOD PRESSURE: 120 MMHG | TEMPERATURE: 99 F

## 2020-12-18 DIAGNOSIS — E66.01 SEVERE OBESITY (BMI 35.0-39.9) WITH COMORBIDITY (HCC): Chronic | ICD-10-CM

## 2020-12-18 DIAGNOSIS — G47.33 OSA (OBSTRUCTIVE SLEEP APNEA): Primary | ICD-10-CM

## 2020-12-18 DIAGNOSIS — J30.0 VASOMOTOR RHINITIS: ICD-10-CM

## 2020-12-18 PROCEDURE — 3078F DIAST BP <80 MM HG: CPT | Performed by: FAMILY MEDICINE

## 2020-12-18 PROCEDURE — 99214 OFFICE O/P EST MOD 30 MIN: CPT | Performed by: FAMILY MEDICINE

## 2020-12-18 PROCEDURE — 3008F BODY MASS INDEX DOCD: CPT | Performed by: FAMILY MEDICINE

## 2020-12-18 PROCEDURE — 3074F SYST BP LT 130 MM HG: CPT | Performed by: FAMILY MEDICINE

## 2020-12-18 RX ORDER — METOPROLOL SUCCINATE 100 MG/1
100 TABLET, EXTENDED RELEASE ORAL DAILY
COMMUNITY
Start: 2020-10-02

## 2020-12-28 ENCOUNTER — HOSPITAL ENCOUNTER (OUTPATIENT)
Dept: CV DIAGNOSTICS | Facility: HOSPITAL | Age: 75
Discharge: HOME | End: 2020-12-28
Attending: INTERNAL MEDICINE
Payer: MEDICARE

## 2020-12-28 DIAGNOSIS — Z86.79 HISTORY OF ATRIAL FIBRILLATION: ICD-10-CM

## 2020-12-28 PROCEDURE — 93270 REMOTE 30 DAY ECG REV/REPORT: CPT | Performed by: INTERNAL MEDICINE

## 2020-12-28 PROCEDURE — 93272 ECG/REVIEW INTERPRET ONLY: CPT | Performed by: INTERNAL MEDICINE

## 2020-12-28 PROCEDURE — 93271 ECG/MONITORING AND ANALYSIS: CPT | Performed by: INTERNAL MEDICINE

## 2021-01-20 ENCOUNTER — TELEPHONE (OUTPATIENT)
Dept: FAMILY MEDICINE CLINIC | Facility: CLINIC | Age: 76
End: 2021-01-20

## 2021-01-26 ENCOUNTER — TELEPHONE (OUTPATIENT)
Dept: FAMILY MEDICINE CLINIC | Facility: CLINIC | Age: 76
End: 2021-01-26

## 2021-01-26 ENCOUNTER — TELEPHONE (OUTPATIENT)
Dept: CARDIOLOGY | Age: 76
End: 2021-01-26

## 2021-01-26 NOTE — TELEPHONE ENCOUNTER
Pt received letter stating she needs lab. Has some questions regarding where she can go and if she needs an order.

## 2021-01-26 NOTE — TELEPHONE ENCOUNTER
Patient states she no longer goes to [facility]. Requested lab preference change to [facility] and will have labs drawn when she is in [facility].

## 2021-01-27 ENCOUNTER — HOSPITAL ENCOUNTER (OUTPATIENT)
Dept: MAMMOGRAPHY | Facility: HOSPITAL | Age: 76
Discharge: HOME | End: 2021-01-27
Attending: INTERNAL MEDICINE
Payer: MEDICARE

## 2021-01-27 DIAGNOSIS — C50.312 MALIGNANT NEOPLASM OF LOWER-INNER QUADRANT OF LEFT FEMALE BREAST, UNSPECIFIED ESTROGEN RECEPTOR STATUS (HCC): ICD-10-CM

## 2021-01-27 DIAGNOSIS — N60.02 CYST OF LEFT BREAST: ICD-10-CM

## 2021-01-27 PROCEDURE — 77066 DX MAMMO INCL CAD BI: CPT | Performed by: INTERNAL MEDICINE

## 2021-01-27 PROCEDURE — 77062 BREAST TOMOSYNTHESIS BI: CPT | Performed by: INTERNAL MEDICINE

## 2021-01-29 ENCOUNTER — OFFICE VISIT (OUTPATIENT)
Dept: HEMATOLOGY/ONCOLOGY | Facility: HOSPITAL | Age: 76
End: 2021-01-29
Attending: INTERNAL MEDICINE
Payer: MEDICARE

## 2021-01-29 VITALS
DIASTOLIC BLOOD PRESSURE: 63 MMHG | HEIGHT: 64.02 IN | SYSTOLIC BLOOD PRESSURE: 131 MMHG | BODY MASS INDEX: 42.23 KG/M2 | WEIGHT: 247.38 LBS | RESPIRATION RATE: 16 BRPM | TEMPERATURE: 97 F | HEART RATE: 62 BPM | OXYGEN SATURATION: 97 %

## 2021-01-29 DIAGNOSIS — C50.312 MALIGNANT NEOPLASM OF LOWER-INNER QUADRANT OF LEFT FEMALE BREAST, UNSPECIFIED ESTROGEN RECEPTOR STATUS (HCC): ICD-10-CM

## 2021-01-29 PROCEDURE — 99213 OFFICE O/P EST LOW 20 MIN: CPT | Performed by: INTERNAL MEDICINE

## 2021-01-29 NOTE — PROGRESS NOTES
Cancer Center Progress Note  Patient Name: Alexa Meneses   YOB: 1945   Medical Record Number: [id number]   CSN: [id number]   Attending Physician: Niurka Alaniz M.D.        Date of Visit: 1/29/2021      Chief Complaint:  Kristine Ribeiro Primary osteoarthritis of right knee 05/16/2017   • Pulmonary embolism ([address] 09/19/2003    Occurred after a long plane ride ON H[place]   • Visual impairment     READERS       Past Surgical History:  Past Surgical History:   Procedure Laterality Date   • [place] 0.0 - 4.0 standard drinks        Frequency: Monthly or less        Drinks per session: 1 or 2        Binge frequency: Never      Drug use: No      Sexual activity: Not on file    Lifestyle      Physical activity        Days per week: Not on file        Min tablet (5 mg total) by mouth 2 (two) times daily. , Disp: , Rfl: 0    Allergies:    Sotalol                 SHORTNESS OF BREATH  Sulfa Antibiotics       HIVES, SWELLING  Lisinopril              Coughing    Comment:cough  Valsartan               Coughing hepatosplenomegaly. Extremities Normal - No visible deformities, no cyanosis, clubbing or edema.     Integumentary Normal - No rashes, No Jaundice   Neurologic Normal - No sensory or motor deficits, normal cerebellar function, normal gait, cranial nerves

## 2021-02-01 ENCOUNTER — TELEPHONE (OUTPATIENT)
Dept: CARDIOLOGY | Age: 76
End: 2021-02-01

## 2021-02-01 RX ORDER — METOPROLOL SUCCINATE 100 MG/1
100 TABLET, EXTENDED RELEASE ORAL 2 TIMES DAILY
Qty: 180 TABLET | Refills: 3 | Status: SHIPPED | OUTPATIENT
Start: 2021-02-01 | End: 2021-03-15 | Stop reason: SDUPTHER

## 2021-02-02 DIAGNOSIS — Z23 NEED FOR VACCINATION: ICD-10-CM

## 2021-02-03 ENCOUNTER — TELEPHONE (OUTPATIENT)
Dept: CARDIOLOGY | Age: 76
End: 2021-02-03

## 2021-02-15 ENCOUNTER — TELEPHONE (OUTPATIENT)
Dept: CARDIOLOGY | Age: 76
End: 2021-02-15

## 2021-02-26 ENCOUNTER — TELEPHONE (OUTPATIENT)
Dept: CARDIOLOGY | Age: 76
End: 2021-02-26

## 2021-03-03 ENCOUNTER — PATIENT OUTREACH (OUTPATIENT)
Dept: FAMILY MEDICINE CLINIC | Facility: CLINIC | Age: 76
End: 2021-03-03

## 2021-03-08 RX ORDER — AMLODIPINE BESYLATE 5 MG/1
TABLET ORAL
Qty: 90 TABLET | Refills: 0 | Status: SHIPPED | OUTPATIENT
Start: 2021-03-08 | End: 2021-06-07

## 2021-03-08 NOTE — TELEPHONE ENCOUNTER
Last office visit 11/30/2020       Last refill 12/9/2020 AMLODIPNE BESYLATE 5 MG # 90 X 0 REFILL         Last Lab 6/11/2020         No future appointments.                      Name from pharmacy: AMLODIPINE BESYLATE 5 MG Tablet         Will file in chart a

## 2021-03-15 ENCOUNTER — MED REC SCAN ONLY (OUTPATIENT)
Dept: FAMILY MEDICINE CLINIC | Facility: CLINIC | Age: 76
End: 2021-03-15

## 2021-03-15 ENCOUNTER — OFFICE VISIT (OUTPATIENT)
Dept: CARDIOLOGY | Age: 76
End: 2021-03-15

## 2021-03-15 VITALS
WEIGHT: 250 LBS | HEART RATE: 57 BPM | DIASTOLIC BLOOD PRESSURE: 88 MMHG | BODY MASS INDEX: 42.68 KG/M2 | SYSTOLIC BLOOD PRESSURE: 120 MMHG | HEIGHT: 64 IN

## 2021-03-15 DIAGNOSIS — I44.7 LEFT BUNDLE BRANCH BLOCK (LBBB): Primary | ICD-10-CM

## 2021-03-15 DIAGNOSIS — I48.0 PAROXYSMAL ATRIAL FIBRILLATION (CMD): ICD-10-CM

## 2021-03-15 PROCEDURE — 93000 ELECTROCARDIOGRAM COMPLETE: CPT | Performed by: INTERNAL MEDICINE

## 2021-03-15 PROCEDURE — 99214 OFFICE O/P EST MOD 30 MIN: CPT | Performed by: INTERNAL MEDICINE

## 2021-03-15 RX ORDER — METOPROLOL SUCCINATE 100 MG/1
100 TABLET, EXTENDED RELEASE ORAL DAILY
COMMUNITY

## 2021-03-15 ASSESSMENT — PATIENT HEALTH QUESTIONNAIRE - PHQ9
2. FEELING DOWN, DEPRESSED OR HOPELESS: NOT AT ALL
1. LITTLE INTEREST OR PLEASURE IN DOING THINGS: NOT AT ALL
CLINICAL INTERPRETATION OF PHQ2 SCORE: NO FURTHER SCREENING NEEDED
CLINICAL INTERPRETATION OF PHQ9 SCORE: NO FURTHER SCREENING NEEDED
SUM OF ALL RESPONSES TO PHQ9 QUESTIONS 1 AND 2: 0
SUM OF ALL RESPONSES TO PHQ9 QUESTIONS 1 AND 2: 0

## 2021-03-25 ENCOUNTER — TELEPHONE (OUTPATIENT)
Dept: HEMATOLOGY/ONCOLOGY | Facility: HOSPITAL | Age: 76
End: 2021-03-25

## 2021-03-25 NOTE — TELEPHONE ENCOUNTER
Pt called re: getting a bill for recent mammogram. Questioning if dx coding is correct. Message forwarded to billing department.

## 2021-04-08 ENCOUNTER — TELEPHONE (OUTPATIENT)
Dept: FAMILY MEDICINE CLINIC | Facility: CLINIC | Age: 76
End: 2021-04-08

## 2021-05-13 ENCOUNTER — TELEPHONE (OUTPATIENT)
Dept: FAMILY MEDICINE CLINIC | Facility: CLINIC | Age: 76
End: 2021-05-13

## 2021-06-07 RX ORDER — AMLODIPINE BESYLATE 5 MG/1
TABLET ORAL
Qty: 90 TABLET | Refills: 0 | Status: SHIPPED | OUTPATIENT
Start: 2021-06-07 | End: 2021-07-09

## 2021-06-14 ENCOUNTER — APPOINTMENT (OUTPATIENT)
Dept: CARDIOLOGY | Age: 76
End: 2021-06-14

## 2021-06-15 ENCOUNTER — OFFICE VISIT (OUTPATIENT)
Dept: FAMILY MEDICINE CLINIC | Facility: CLINIC | Age: 76
End: 2021-06-15
Payer: MEDICARE

## 2021-06-15 VITALS
TEMPERATURE: 97 F | SYSTOLIC BLOOD PRESSURE: 124 MMHG | HEART RATE: 54 BPM | OXYGEN SATURATION: 96 % | BODY MASS INDEX: 42.58 KG/M2 | WEIGHT: 249.38 LBS | RESPIRATION RATE: 18 BRPM | DIASTOLIC BLOOD PRESSURE: 78 MMHG | HEIGHT: 64 IN

## 2021-06-15 DIAGNOSIS — G47.33 OSA (OBSTRUCTIVE SLEEP APNEA): Primary | ICD-10-CM

## 2021-06-15 DIAGNOSIS — E66.01 SEVERE OBESITY (BMI 35.0-39.9) WITH COMORBIDITY (HCC): ICD-10-CM

## 2021-06-15 PROCEDURE — 3074F SYST BP LT 130 MM HG: CPT | Performed by: FAMILY MEDICINE

## 2021-06-15 PROCEDURE — 3078F DIAST BP <80 MM HG: CPT | Performed by: FAMILY MEDICINE

## 2021-06-15 PROCEDURE — 3008F BODY MASS INDEX DOCD: CPT | Performed by: FAMILY MEDICINE

## 2021-06-15 PROCEDURE — 99214 OFFICE O/P EST MOD 30 MIN: CPT | Performed by: FAMILY MEDICINE

## 2021-06-15 NOTE — PROGRESS NOTES
[facility]  SLE[facility]        HPI:   This is a 78year old female coming in for Patient presents with:   Follow - Up  Obstructive Sleep Apnea (MARY LOU): 6 months       HPI:  Patient was having some difficulty with her full face mask (min) - -       Lab Results   Component Value Date    IRON 47 (L) 05/20/2019     Lab Results   Component Value Date    MAKENZIE 179.9 05/20/2019     No results found for: [facility] Franco, [facility]  Lab Results   Component Value Date    B12 773 05/20/2019 on file    Social History    Tobacco Use      Smoking status: Never Smoker      Smokeless tobacco: Never Used    Vaping Use      Vaping Use: Never used    Alcohol use:  Yes      Alcohol/week: 0.0 - 4.0 standard drinks    Drug use: No    Family History:  Fam sharp sec lasting anterior chest catch. Gastrointestinal: Negative. Endocrine: Negative. Genitourinary: Negative. Musculoskeletal: Negative. Skin: Negative. Allergic/Immunologic: Negative. Neurological: Negative.     Hematological: Ne rate and regular rhythm. Heart sounds: Normal heart sounds. Pulmonary:      Effort: Pulmonary effort is normal.   Musculoskeletal:      Cervical back: Normal range of motion and neck supple. Lymphadenopathy:      Cervical: No cervical adenopathy. of Sleep Download as defined above. Continue with Rx management of Sleep apnea with Pap therapy.         Meds & Refills for this Visit:  Requested Prescriptions      No prescriptions requested or ordered in this encounter       Outcome: Parent verbalizes u

## 2021-06-16 ENCOUNTER — TELEPHONE (OUTPATIENT)
Dept: CARDIOLOGY | Age: 76
End: 2021-06-16

## 2021-06-23 ENCOUNTER — PATIENT OUTREACH (OUTPATIENT)
Dept: FAMILY MEDICINE CLINIC | Facility: CLINIC | Age: 76
End: 2021-06-23

## 2021-07-08 NOTE — TELEPHONE ENCOUNTER
Patient has scheduled a follow up sleep visit, wanted to know whether she needed to bring anything to the appointment aside from her SD card. Informed patient her SD card will be sufficient. 77 year old woman well known to palliative care  She has advanced COPD and has been referred to hospice multiple times; revoked within the last month per discussion with hospice  Now here for suicide attempt    Patient seen at bedside. She was asleep and obtunded, and unable to participate in exam.  Primary team, per EMR, had GOC discussion with son August overnight and he maintained DNR/DNI and was considering hospice    Today, per chart review, patient is awaiting medical stability for ?inpatient psych transfer due to suicide attempt  Patient has end stage COPD and has opted for hospice previously  Will attempt to reach out to patient's son and discuss GOC moving forward prior to IPP transfer to discuss appropriateness of such a transfer    Will follow

## 2021-07-09 NOTE — TELEPHONE ENCOUNTER
Last refill on amlodipine #90 on 6/7/2021  Last refill on simvastatin #90 x 3 on 6/12/2020  Last office visit pertaining to refill on 11/30/2020  No future appointments.   BP Readings from Last 3 Encounters:  06/15/21 : 124/78  01/29/21 : 131/63  12/18/20 :

## 2021-07-11 RX ORDER — AMLODIPINE BESYLATE 5 MG/1
TABLET ORAL
Qty: 30 TABLET | Refills: 0 | Status: SHIPPED | OUTPATIENT
Start: 2021-07-11 | End: 2021-09-28

## 2021-07-11 RX ORDER — SIMVASTATIN 20 MG
TABLET ORAL
Qty: 90 TABLET | Refills: 3 | Status: SHIPPED | OUTPATIENT
Start: 2021-07-11

## 2021-07-29 ENCOUNTER — TELEPHONE (OUTPATIENT)
Dept: FAMILY MEDICINE CLINIC | Facility: CLINIC | Age: 76
End: 2021-07-29

## 2021-09-27 RX ORDER — AMLODIPINE BESYLATE 5 MG/1
TABLET ORAL
Qty: 30 TABLET | Refills: 0 | OUTPATIENT
Start: 2021-09-27

## 2021-09-27 NOTE — TELEPHONE ENCOUNTER
Hypertension Medications Protocol Failed 09/27/2021 10:15 AM   Protocol Details  CMP or BMP in past 12 months    Appointment in past 6 or next 3 months          Last office visit:  11/30/20  Last refill:  07/11/21  #30, no refills  Last cmp:  07/11/21  #3

## 2021-09-28 RX ORDER — AMLODIPINE BESYLATE 5 MG/1
5 TABLET ORAL EVERY MORNING
Qty: 30 TABLET | Refills: 2 | Status: SHIPPED | OUTPATIENT
Start: 2021-09-28 | End: 2021-11-09

## 2021-09-28 NOTE — TELEPHONE ENCOUNTER
REFILL AMLODIPINE TO [facility], LEAVING OUT OF TOWN TOMORROW, NEEDS THIS FILLED TODAY, SHE HAS SUPERVISIT APPT HERE 11/9, CALL WHEN IT HAS BEEN SENT TO [facility]

## 2021-09-28 NOTE — TELEPHONE ENCOUNTER
LOV: 11/30/20    LAST LAB: 08/19/20    LAST RX: 07/11/21    Next OV:   Future Appointments   Date Time Provider Cristina Hanna   11/9/2021 10:15 AM Jahaira Holm DO [facility] [place]         PROTOCOL  Hypertension Medications Protocol Failed 09/2

## 2021-11-01 ENCOUNTER — TELEPHONE (OUTPATIENT)
Dept: HEMATOLOGY/ONCOLOGY | Facility: HOSPITAL | Age: 76
End: 2021-11-01

## 2021-11-01 DIAGNOSIS — C50.312 MALIGNANT NEOPLASM OF LOWER-INNER QUADRANT OF LEFT FEMALE BREAST, UNSPECIFIED ESTROGEN RECEPTOR STATUS (HCC): Primary | ICD-10-CM

## 2021-11-01 NOTE — TELEPHONE ENCOUNTER
Patient calling and has made her F/U appointment for 1/28/2022.     She is requesting the order for her Mammogram.    Pleas advise when order has been placed

## 2021-11-03 NOTE — PROGRESS NOTES
Patient due for Pap and HPV.    Reminder done today via [employer].     [facility]  [facility]        HPI:   This is a 70year old female coming in for Patient presents with:  Obstructive Sleep Apnea (MARY LOU)      HPI: patient states that she is done napping and not feeling tired, and feels good during Date   • Allergic rhinitis due to allergen 12/27/2017   • Arrhythmia    • Breast cancer ([facility] [address] 1/6/16    Invasive Ductal Carcinoma-Grade 1   • Calculus of kidney 2009   • Cervix dysplasia     LUZ 3 with laser cone March 2010   • Essential hypertension    • less      Drinks per session: 1 or 2      Binge frequency: Never    Drug use: No    Family History:  Family History   Problem Relation Age of Onset   • Breast Cancer Self 78   • Stroke Mother    • Heart Attack Father 45   • BRCA gene + Sister    • Breast C Allergic/Immunologic: Negative. Neurological: Negative. Hematological: Negative. Psychiatric/Behavioral: Positive for sleep disturbance. All other systems reviewed and are negative.       EXAM:   /64 (BP Location: Left arm, Patient Positi Neurological: She is alert and oriented to person, place, and time. She has normal reflexes. Skin: Skin is warm and dry. She is not diaphoretic. Psychiatric: She has a normal mood and affect.  Her behavior is normal. Judgment and thought content danielle Dragon speech recognition software. Please excuse any grammatical errors. Call my office if you have any questions regarding this note.  \"     Blanca Badillo MD  12/18/2020  3:09 PM

## 2021-11-09 ENCOUNTER — LABORATORY ENCOUNTER (OUTPATIENT)
Dept: LAB | Age: 76
End: 2021-11-09
Attending: FAMILY MEDICINE
Payer: MEDICARE

## 2021-11-09 ENCOUNTER — OFFICE VISIT (OUTPATIENT)
Dept: FAMILY MEDICINE CLINIC | Facility: CLINIC | Age: 76
End: 2021-11-09
Payer: MEDICARE

## 2021-11-09 VITALS
HEART RATE: 86 BPM | OXYGEN SATURATION: 98 % | RESPIRATION RATE: 18 BRPM | BODY MASS INDEX: 38.65 KG/M2 | HEIGHT: 65 IN | SYSTOLIC BLOOD PRESSURE: 138 MMHG | TEMPERATURE: 99 F | WEIGHT: 232 LBS | DIASTOLIC BLOOD PRESSURE: 80 MMHG

## 2021-11-09 DIAGNOSIS — E78.5 HYPERLIPIDEMIA, UNSPECIFIED HYPERLIPIDEMIA TYPE: ICD-10-CM

## 2021-11-09 DIAGNOSIS — I10 PRIMARY HYPERTENSION: ICD-10-CM

## 2021-11-09 DIAGNOSIS — E66.01 MORBID OBESITY (HCC): Primary | ICD-10-CM

## 2021-11-09 DIAGNOSIS — Z00.00 ENCOUNTER FOR ANNUAL HEALTH EXAMINATION: Primary | ICD-10-CM

## 2021-11-09 DIAGNOSIS — G47.33 OSA (OBSTRUCTIVE SLEEP APNEA): ICD-10-CM

## 2021-11-09 DIAGNOSIS — Z23 FLU VACCINE NEED: ICD-10-CM

## 2021-11-09 DIAGNOSIS — I48.0 PAROXYSMAL ATRIAL FIBRILLATION (HCC): ICD-10-CM

## 2021-11-09 PROCEDURE — 83550 IRON BINDING TEST: CPT

## 2021-11-09 PROCEDURE — 36415 COLL VENOUS BLD VENIPUNCTURE: CPT

## 2021-11-09 PROCEDURE — G0439 PPPS, SUBSEQ VISIT: HCPCS | Performed by: FAMILY MEDICINE

## 2021-11-09 PROCEDURE — 3008F BODY MASS INDEX DOCD: CPT | Performed by: FAMILY MEDICINE

## 2021-11-09 PROCEDURE — 3079F DIAST BP 80-89 MM HG: CPT | Performed by: FAMILY MEDICINE

## 2021-11-09 PROCEDURE — 3075F SYST BP GE 130 - 139MM HG: CPT | Performed by: FAMILY MEDICINE

## 2021-11-09 PROCEDURE — 82728 ASSAY OF FERRITIN: CPT

## 2021-11-09 PROCEDURE — 83540 ASSAY OF IRON: CPT

## 2021-11-09 PROCEDURE — 80053 COMPREHEN METABOLIC PANEL: CPT

## 2021-11-09 PROCEDURE — 80061 LIPID PANEL: CPT

## 2021-11-09 PROCEDURE — 99397 PER PM REEVAL EST PAT 65+ YR: CPT | Performed by: FAMILY MEDICINE

## 2021-11-09 PROCEDURE — 96160 PT-FOCUSED HLTH RISK ASSMT: CPT | Performed by: FAMILY MEDICINE

## 2021-11-09 RX ORDER — AMLODIPINE BESYLATE 5 MG/1
5 TABLET ORAL EVERY MORNING
Qty: 90 TABLET | Refills: 3 | Status: SHIPPED | OUTPATIENT
Start: 2021-11-09 | End: 2022-02-07

## 2021-11-09 NOTE — PROGRESS NOTES
HPI:   Kirk Betts is a 78year old female who presents for a MA (Medicare Advantage) [facility] (Once per calendar year).     No acute complaints  No topic due editable text        Fall/Risk Assessment abnormal    She has been screened for Fa (112.2 kg)     Last Cholesterol Labs:   Lab Results   Component Value Date    CHOLEST 159 06/11/2020    HDL 53 06/11/2020    LDL 85 06/11/2020    TRIG 118 06/11/2020          Last Chemistry Labs:   Lab Results   Component Value Date    AST 21 06/11/2020 colonoscopy (2016); lumpectomy left (2/3/16); radiation left; other surgical history (11/19/08); other surgical history (3/13/09); other surgical history (1/20/10); other surgical history (12/11/12); other surgical history (2/6/15); other surgical history Eyes Chart Acuity: 20/15   Able To Tolerate Visual Acuity: Yes      General Appearance:  Alert, cooperative, no distress, appears stated age   Head:  Normocephalic, without obvious abnormality, atraumatic   Eyes:  PERRL, conjunctiva/corneas clear, EOM's in will consider    3. Paroxysmal atrial fibrillation (HCC)  Controlled and stable      4. Primary hypertension  Controlled and stable    - COMP METABOLIC PANEL (14); Future    5. MARY LOU (obstructive sleep apnea)  Controlled and stable      6.  Hyperlipidemia, un months if never tested or if previously tested but not diagnosed with pre-diabetes   One screening every 6 months if diagnosed with pre-diabetes Lab Results   Component Value Date    GLU 95 08/19/2020        Cardiovascular Disease Screening    Lipid Panel 01/27/2021    Mammogram due on 01/27/2022    Immunizations    Influenza Covered once per flu season  Please get every year -  Influenza Vaccine(1) due on 10/01/2021    Pneumococcal Each vaccine (Pjcetai50 & [id number]) covered once after 65 Prevnar 13: 10

## 2021-11-09 NOTE — PATIENT INSTRUCTIONS
Jono Hernandez's SCREENING SCHEDULE   Tests on this list are recommended by your physician but may not be covered, or covered at this frequency, by your insurer. Please check with your insurance carrier before scheduling to verify coverage. LM informing pt.    Jessica MEADOWS, Patient Care      recommendations at this time   Pap and Pelvic    Pap   Covered every 2 years for women at normal risk;  Annually if at high risk -  No recommendations at this time    Chlamydia Annually if high risk -  No recommendations at this time   Screening Mammogram the appsplit. This site has a lot of good information including definitions of the different types of Advance Directives.  It also has the State forms available on it's website for anyone to review and print using their home computer a

## 2021-11-10 ENCOUNTER — TELEPHONE (OUTPATIENT)
Dept: FAMILY MEDICINE CLINIC | Facility: CLINIC | Age: 76
End: 2021-11-10

## 2021-11-10 DIAGNOSIS — E78.5 HYPERLIPIDEMIA, UNSPECIFIED HYPERLIPIDEMIA TYPE: ICD-10-CM

## 2021-11-10 DIAGNOSIS — I10 PRIMARY HYPERTENSION: Primary | ICD-10-CM

## 2021-11-10 NOTE — TELEPHONE ENCOUNTER
----- Message from JOSE Mann sent at 11/9/2021  4:52 PM CST -----  Dr. Luis New is out of the office today. Vitamin D looks great. Continue current supplements.

## 2021-11-10 NOTE — TELEPHONE ENCOUNTER
----- Message from JOSE Birmingham sent at 11/9/2021  4:53 PM CST -----  Dr. Joselito Tony is out of the office today. Iron levels improving. Continue supplements. Note to MyChart.

## 2021-11-29 ENCOUNTER — TELEPHONE (OUTPATIENT)
Dept: FAMILY MEDICINE CLINIC | Facility: CLINIC | Age: 76
End: 2021-11-29

## 2021-11-29 NOTE — TELEPHONE ENCOUNTER
PT HAS CPAP MACHINE, USUALLY SEES DR HAINES BUT SHE IS CURRENTLY ON LEAVE, WHO ELSE DOES  RECOMMEND SHE SEES?

## 2021-11-30 NOTE — TELEPHONE ENCOUNTER
Dr. Pamela Baze is currently out of the office. Per Dr. Ramointa Cheung, advised she may schedule w/covidering provider, JOSE Deshpande. Spouse verbalized understanding.

## 2021-12-02 ENCOUNTER — TELEPHONE (OUTPATIENT)
Dept: HEMATOLOGY/ONCOLOGY | Facility: HOSPITAL | Age: 76
End: 2021-12-02

## 2021-12-02 ENCOUNTER — NURSE NAVIGATOR ENCOUNTER (OUTPATIENT)
Dept: HEMATOLOGY/ONCOLOGY | Facility: HOSPITAL | Age: 76
End: 2021-12-02

## 2021-12-02 NOTE — PROGRESS NOTES
Called patient in regards to rescheduling her mammogram due to a self palpable breast lump that she told Dr. Blaire Zeng and his nurse, Gilmer Downs called me to help patient get a sooner mammogram scheduled rather than one that was scheduled for December 14, 20

## 2021-12-02 NOTE — TELEPHONE ENCOUNTER
Pt states she feels a lump on her lumpectomy side. Instructed patient to get her mammogram done now. Pt verbalized understanding. Pt states she called and cannot get mammogram scheduled until Dec 14.   Bettina Pitts, breast care navigator informed and will get p

## 2021-12-03 ENCOUNTER — HOSPITAL ENCOUNTER (OUTPATIENT)
Dept: MAMMOGRAPHY | Age: 76
Discharge: HOME | End: 2021-12-03
Attending: INTERNAL MEDICINE
Payer: MEDICARE

## 2021-12-03 ENCOUNTER — HOSPITAL ENCOUNTER (OUTPATIENT)
Dept: ULTRASOUND IMAGING | Age: 76
Discharge: HOME | End: 2021-12-03
Attending: INTERNAL MEDICINE
Payer: MEDICARE

## 2021-12-03 DIAGNOSIS — C50.312 MALIGNANT NEOPLASM OF LOWER-INNER QUADRANT OF LEFT FEMALE BREAST, UNSPECIFIED ESTROGEN RECEPTOR STATUS (HCC): ICD-10-CM

## 2021-12-03 PROCEDURE — 77066 DX MAMMO INCL CAD BI: CPT | Performed by: INTERNAL MEDICINE

## 2021-12-03 PROCEDURE — 77062 BREAST TOMOSYNTHESIS BI: CPT | Performed by: INTERNAL MEDICINE

## 2021-12-03 PROCEDURE — 76642 ULTRASOUND BREAST LIMITED: CPT | Performed by: INTERNAL MEDICINE

## 2021-12-09 ENCOUNTER — OFFICE VISIT (OUTPATIENT)
Dept: HEMATOLOGY/ONCOLOGY | Age: 76
End: 2021-12-09
Attending: INTERNAL MEDICINE
Payer: MEDICARE

## 2021-12-09 VITALS
OXYGEN SATURATION: 96 % | DIASTOLIC BLOOD PRESSURE: 80 MMHG | HEART RATE: 59 BPM | TEMPERATURE: 96 F | BODY MASS INDEX: 37 KG/M2 | RESPIRATION RATE: 20 BRPM | SYSTOLIC BLOOD PRESSURE: 133 MMHG | WEIGHT: 224.31 LBS

## 2021-12-09 DIAGNOSIS — N63.20 LUMP OF BREAST, LEFT: ICD-10-CM

## 2021-12-09 DIAGNOSIS — Z85.3 HISTORY OF BREAST CANCER: Primary | ICD-10-CM

## 2021-12-09 PROCEDURE — 99214 OFFICE O/P EST MOD 30 MIN: CPT | Performed by: INTERNAL MEDICINE

## 2021-12-09 NOTE — PROGRESS NOTES
Patient is here for follow up for breast cancer. She had recently felt a lump in her left breast.  She has recently lost 34# as she has intended to do. She had a mammogram on 12/3/2021. She offers no other complaints.      Education Record    Learner:

## 2021-12-09 NOTE — PROGRESS NOTES
Cancer Center Progress Note  Patient Name: Bayron Flowers   YOB: 1945   Medical Record Number: [id number]   CSN: [id number]   Attending Physician: Estrellita Villanueva M.D.        Date of Visit: 12/9/2021        Chief Complaint:  Sri Carl the liver on ultrasound August 2011   • Pneumonia due to organism 2014    LEFT LUNG   • Primary osteoarthritis of both hips 5/16/2017   • Primary osteoarthritis of left knee 5/16/2017   • Primary osteoarthritis of right knee 05/16/2017   • Pulmonary emboli Drug use: No      Sexual activity: Not on file    Other Topics      Concerns:        Caffeine Concern: Not Asked        Exercise: Not Asked        Seat Belt: Not Asked        Special Diet: Not Asked        Stress Concern: Not Asked        Weight Concern: N chest pain, palpitations or orthopnea. Gastrointestinal No nausea, vomiting, diarrhea, GI bleeding, or constipation. Genitorurinary  No hematuria, dysuria, or incontinence. No abnormal bleeding.    Integumentary No rashes or yellowing of the skin   Neur the left breast. There are no focal suspicious masses, calcifications, or areas of architectural distortion in the left breast.       Annual screening mammography is recommended, or sooner if clinically indicated.          Pathology:    Impression and Plan:

## 2021-12-13 ENCOUNTER — OFFICE VISIT (OUTPATIENT)
Dept: FAMILY MEDICINE CLINIC | Facility: CLINIC | Age: 76
End: 2021-12-13
Payer: MEDICARE

## 2021-12-13 VITALS
DIASTOLIC BLOOD PRESSURE: 68 MMHG | RESPIRATION RATE: 18 BRPM | SYSTOLIC BLOOD PRESSURE: 124 MMHG | OXYGEN SATURATION: 97 % | TEMPERATURE: 98 F | HEART RATE: 60 BPM

## 2021-12-13 DIAGNOSIS — M54.50 CHRONIC BILATERAL LOW BACK PAIN WITHOUT SCIATICA: Primary | ICD-10-CM

## 2021-12-13 DIAGNOSIS — G89.29 CHRONIC BILATERAL LOW BACK PAIN WITHOUT SCIATICA: Primary | ICD-10-CM

## 2021-12-13 PROCEDURE — 3074F SYST BP LT 130 MM HG: CPT | Performed by: FAMILY MEDICINE

## 2021-12-13 PROCEDURE — 3078F DIAST BP <80 MM HG: CPT | Performed by: FAMILY MEDICINE

## 2021-12-13 PROCEDURE — 99213 OFFICE O/P EST LOW 20 MIN: CPT | Performed by: FAMILY MEDICINE

## 2021-12-13 RX ORDER — CYCLOBENZAPRINE HCL 10 MG
10 TABLET ORAL NIGHTLY
Qty: 15 TABLET | Refills: 0 | Status: SHIPPED | OUTPATIENT
Start: 2021-12-13 | End: 2021-12-28

## 2021-12-14 ENCOUNTER — TELEPHONE (OUTPATIENT)
Dept: PHYSICAL THERAPY | Facility: HOSPITAL | Age: 76
End: 2021-12-14

## 2021-12-15 ENCOUNTER — OFFICE VISIT (OUTPATIENT)
Dept: PHYSICAL THERAPY | Age: 76
End: 2021-12-15
Attending: FAMILY MEDICINE
Payer: MEDICARE

## 2021-12-15 DIAGNOSIS — M54.50 CHRONIC BILATERAL LOW BACK PAIN WITHOUT SCIATICA: ICD-10-CM

## 2021-12-15 DIAGNOSIS — G89.29 CHRONIC BILATERAL LOW BACK PAIN WITHOUT SCIATICA: ICD-10-CM

## 2021-12-15 PROCEDURE — 97110 THERAPEUTIC EXERCISES: CPT

## 2021-12-15 PROCEDURE — 97112 NEUROMUSCULAR REEDUCATION: CPT

## 2021-12-15 PROCEDURE — 97161 PT EVAL LOW COMPLEX 20 MIN: CPT

## 2021-12-22 ENCOUNTER — OFFICE VISIT (OUTPATIENT)
Dept: PHYSICAL THERAPY | Age: 76
End: 2021-12-22
Attending: FAMILY MEDICINE
Payer: MEDICARE

## 2021-12-22 DIAGNOSIS — M54.50 CHRONIC BILATERAL LOW BACK PAIN WITHOUT SCIATICA: ICD-10-CM

## 2021-12-22 DIAGNOSIS — G89.29 CHRONIC BILATERAL LOW BACK PAIN WITHOUT SCIATICA: ICD-10-CM

## 2021-12-22 PROCEDURE — 97140 MANUAL THERAPY 1/> REGIONS: CPT

## 2021-12-22 PROCEDURE — 97110 THERAPEUTIC EXERCISES: CPT

## 2021-12-22 PROCEDURE — 97112 NEUROMUSCULAR REEDUCATION: CPT

## 2021-12-22 NOTE — PROGRESS NOTES
Dx: Back pain         Insurance (Authorized # of Visits):  [facility] [place] visits authorized expires 1/26/2022         Authorizing Physician: Dr. Brynn Galeana  Next MD visit: none scheduled    Fall Risk: standard         Precautions: n/a               Subjective: Use sheet behind your low back to go further back. Hands on middle of waist as you bend backwards to push sheet forward and get more of a stretch on your back    Reminders:  A. In/out of car: Get into car with your buttock going first  B.  Pews: sit up tall correction (listed below) reduced pain. Discussed plan of care    Educated on home program, see below. Verbal, visual and tactile cues for there ex.          Manual 8 minutes:  Shift correction technique in standing hips to the R: better         N Re-Ed

## 2021-12-23 ENCOUNTER — OFFICE VISIT (OUTPATIENT)
Dept: PHYSICAL THERAPY | Age: 76
End: 2021-12-23
Attending: FAMILY MEDICINE
Payer: MEDICARE

## 2021-12-23 PROCEDURE — 97140 MANUAL THERAPY 1/> REGIONS: CPT

## 2021-12-23 PROCEDURE — 97110 THERAPEUTIC EXERCISES: CPT

## 2021-12-23 NOTE — PROGRESS NOTES
Dx: Back pain         Insurance (Authorized # of Visits):  [facility] [employer] [facility] 3/8 visits authorized expires 1/26/2022         Authorizing Physician: Dr. Bell Clark MD visit: none scheduled    Fall Risk: standard         Precautions: n/a               Subjective: go further back. Hands on middle of waist as you bend backwards to push sheet forward and get more of a stretch on your back    Reminders:  A. In out of bed: Get in on other side to avoid raising left leg  B.  In/out of car: Get into car with your buttock g and tactile cues for there ex.    There ex:  L side glide on wall 10-25x2: a bit better    L side glide in doorframe 10-25x3: a bit more better    Repeated extension in standing x20 patient overpressure x3 sets    Discussed plan of care, posture education

## 2021-12-28 ENCOUNTER — OFFICE VISIT (OUTPATIENT)
Dept: PHYSICAL THERAPY | Age: 76
End: 2021-12-28
Attending: FAMILY MEDICINE
Payer: MEDICARE

## 2021-12-28 PROCEDURE — 97110 THERAPEUTIC EXERCISES: CPT

## 2021-12-28 PROCEDURE — 97112 NEUROMUSCULAR REEDUCATION: CPT

## 2021-12-28 NOTE — PROGRESS NOTES
Progress Summary  Pt has attended 4 visits in Physical Therapy.      Dx: Back pain and knee pain        Insurance (Authorized # of Visits):  [facility] 4/8 visits authorized expires 1/26/2022         Authorizing Physician: Dr. Lynette Castillo  Next MD visit: none sc progress on 12/28/2021)    FOTO: 54/100    Plan: Continue skilled Physical Therapy 1-2 x/week or a total of 16 visits over a 90 day period.  Treatment will include: manual therapy, therapeutic exercise, therapeutic activites, neuromuscular reeducation and h buttock going first  C. Pews: sit up tall, lumbar support. Scoot forward to get up from pew  D.  Sit in firm chairs, with back support, every time you sit (Kit, presents, ect)  E. Puzzle do in firm chairs, avoid doing standing  F. Present wrapping: avoid care, posture education    Educated on home program, see below. Verbal, visual and tactile cues for there ex.    There ex:  L side glide in doorframe 10-25x3: better    Repeated extension in standing x20 patient overpressure x2 sets    Self-patella medial No

## 2022-01-05 ENCOUNTER — OFFICE VISIT (OUTPATIENT)
Dept: PHYSICAL THERAPY | Age: 77
End: 2022-01-05
Attending: FAMILY MEDICINE
Payer: MEDICARE

## 2022-01-05 DIAGNOSIS — M54.50 CHRONIC BILATERAL LOW BACK PAIN WITHOUT SCIATICA: ICD-10-CM

## 2022-01-05 DIAGNOSIS — G89.29 CHRONIC BILATERAL LOW BACK PAIN WITHOUT SCIATICA: ICD-10-CM

## 2022-01-05 PROCEDURE — 97112 NEUROMUSCULAR REEDUCATION: CPT

## 2022-01-05 PROCEDURE — 97110 THERAPEUTIC EXERCISES: CPT

## 2022-01-05 NOTE — PROGRESS NOTES
Dx: Back pain and knee pain        Insurance (Authorized # of Visits):  [facility] [employer] [facility] 5/8 visits authorized expires 1/26/2022         Authorizing Physician: Dr. Morales Tapia  Next MD visit: none scheduled    Fall Risk: standard         Precautions: n/a glidarron and review posture  Shift correction again   Consider unloaded shift correction (hips off center)    Current HEP:   Every two hours   Right forearm on doorframe and left hand grab side of doorway slide hips to the right 30-70 times. a.  If you get front of you, stand up and move your feet. You can wrap sitting or standing, but nose between toes with this, avoid twisting. J. Shoes and socks, try putting these on while laying on your back  K. Reading/phone, pillows in lap, have this up high  L.  Out o plan of care, posture education    Educated on home program, see below. Verbal, visual and tactile cues for there ex. There ex:  Lumbar flexion in sitting x10: worse after.     L side glide in doorframe 10-25x3: better    Repeated extension in standing x

## 2022-01-07 ENCOUNTER — APPOINTMENT (OUTPATIENT)
Dept: PHYSICAL THERAPY | Age: 77
End: 2022-01-07
Attending: FAMILY MEDICINE
Payer: MEDICARE

## 2022-01-12 ENCOUNTER — APPOINTMENT (OUTPATIENT)
Dept: PHYSICAL THERAPY | Age: 77
End: 2022-01-12
Attending: FAMILY MEDICINE
Payer: MEDICARE

## 2022-01-14 ENCOUNTER — OFFICE VISIT (OUTPATIENT)
Dept: PHYSICAL THERAPY | Age: 77
End: 2022-01-14
Attending: FAMILY MEDICINE
Payer: MEDICARE

## 2022-01-14 PROCEDURE — 97110 THERAPEUTIC EXERCISES: CPT

## 2022-01-14 PROCEDURE — 97140 MANUAL THERAPY 1/> REGIONS: CPT

## 2022-01-14 PROCEDURE — 97112 NEUROMUSCULAR REEDUCATION: CPT

## 2022-01-14 NOTE — PROGRESS NOTES
Dx: Back pain and knee pain        Insurance (Authorized # of Visits):  [employer] 3/4 visits authorized expires April 10, 2022         [id number] Lalitha Mata Physician: Dr. Corina Clark MD visit: none scheduled    Fall Risk: standard         Precautions: n/a 12/28/2021)  Pt will flexion ROM from severe loss with pain to min loss with no pain to allow for improved ability to put on her shoes (70% progress on 12/28/2021)  Pt will be independent with home program to allow for maintenance of goals achieved in ther ready to workout:  a. Treadmill 10-15 minutes  b. Total gym: 15 times, short break and 15 times again  c. Upright posture during these activities and the hour after. G. In out of bed: Get in on other side to avoid raising left leg  H.  In/out of car: Get i pain.    Discussed plan of care    Educated on home program, see below. Verbal, visual and tactile cues for there ex.    There ex:  L side glide on wall 10-25x2: a bit better    L side glide in doorframe 10-25x3: a bit more better    Repeated extension in at this time. N Re-Ed:  Motivational interviewing to increase adherence. Practiced turning without twisting. Education on how to take breaks to avoid slumping when gets tired.

## 2022-01-19 ENCOUNTER — APPOINTMENT (OUTPATIENT)
Dept: PHYSICAL THERAPY | Age: 77
End: 2022-01-19
Attending: FAMILY MEDICINE
Payer: MEDICARE

## 2022-01-19 ENCOUNTER — OFFICE VISIT (OUTPATIENT)
Dept: FAMILY MEDICINE CLINIC | Facility: CLINIC | Age: 77
End: 2022-01-19
Payer: MEDICARE

## 2022-01-19 VITALS
WEIGHT: 226 LBS | RESPIRATION RATE: 18 BRPM | SYSTOLIC BLOOD PRESSURE: 122 MMHG | HEIGHT: 65 IN | BODY MASS INDEX: 37.65 KG/M2 | OXYGEN SATURATION: 98 % | TEMPERATURE: 98 F | HEART RATE: 57 BPM | DIASTOLIC BLOOD PRESSURE: 68 MMHG

## 2022-01-19 DIAGNOSIS — G47.33 OSA (OBSTRUCTIVE SLEEP APNEA): Primary | ICD-10-CM

## 2022-01-19 PROCEDURE — 99214 OFFICE O/P EST MOD 30 MIN: CPT | Performed by: NURSE PRACTITIONER

## 2022-01-19 PROCEDURE — 3078F DIAST BP <80 MM HG: CPT | Performed by: NURSE PRACTITIONER

## 2022-01-19 PROCEDURE — 3074F SYST BP LT 130 MM HG: CPT | Performed by: NURSE PRACTITIONER

## 2022-01-19 PROCEDURE — 3008F BODY MASS INDEX DOCD: CPT | Performed by: NURSE PRACTITIONER

## 2022-01-19 NOTE — PATIENT INSTRUCTIONS
Continue sleep therapy. We will increase pressure to 15. We will decrease tube temp and humidity to 2. Follow-up in 2 months - sooner if needed.      Advised if still with sleep apnea and not using CPAP has a  7 fold increase in risk of heart attack, st

## 2022-01-19 NOTE — PROGRESS NOTES
[facility]        HPI:   This is a 78year old female coming in for Patient presents with:  Obstructive Sleep Apnea (MARY LOU): Sleep follow up      HPI:     Patient is present for sleep therapy follow up.    States is hav PLM INDEX (/hr) - -   SS Facility - -   Sleep Study CM (CM H2O) - -   Sleep EFFC (%) - -   TOT Sleep TM (min) - -         Past Medical History:   Diagnosis Date   • Allergic rhinitis due to allergen 12/27/2017   • Arrhythmia    [facility] Use: Never used    Alcohol use:  Yes      Alcohol/week: 0.0 - 4.0 standard drinks    Drug use: No    Family History:  Family History   Adopted: Yes   Problem Relation Age of Onset   • Breast Cancer Self 78   • Stroke Mother    • Heart Attack Father 45   • B BMI 37.61 kg/m²  Estimated body mass index is 37.61 kg/m² as calculated from the following:    Height as of this encounter: 5' 5\" (1.651 m). Weight as of this encounter: 226 lb (102.5 kg). Neck in inches:       Wt Readings from Last 6 Encounters:  01/ 15.  We will decrease tube temp and humidity to 2. Follow-up in 2 months - sooner if needed.      Advised if still with sleep apnea and not using CPAP has a  7 fold increase in risk of heart attack, stroke, abnormal heart rhythm  and death,  increased risk symptoms. Parent is to call with any side effects or complications from the treatments as a result of today.        JOSE Ch  1/19/2022  9:23 AM

## 2022-01-21 ENCOUNTER — APPOINTMENT (OUTPATIENT)
Dept: PHYSICAL THERAPY | Age: 77
End: 2022-01-21
Attending: FAMILY MEDICINE
Payer: MEDICARE

## 2022-01-26 ENCOUNTER — TELEPHONE (OUTPATIENT)
Dept: PHYSICAL THERAPY | Age: 77
End: 2022-01-26

## 2022-01-28 ENCOUNTER — APPOINTMENT (OUTPATIENT)
Dept: PHYSICAL THERAPY | Age: 77
End: 2022-01-28
Attending: FAMILY MEDICINE
Payer: MEDICARE

## 2022-01-28 ENCOUNTER — APPOINTMENT (OUTPATIENT)
Dept: HEMATOLOGY/ONCOLOGY | Facility: HOSPITAL | Age: 77
End: 2022-01-28
Payer: MEDICARE

## 2022-02-01 ENCOUNTER — TELEPHONE (OUTPATIENT)
Dept: PHYSICAL THERAPY | Age: 77
End: 2022-02-01

## 2022-02-01 NOTE — TELEPHONE ENCOUNTER
Reported doing much better, very little pain and able to be more active. Ready for discharge, told continue home program for 6 more weeks, after that do home program for once a day after that. FOTO done via phone, told gradually increase activity and continue to be aware of posture.

## 2022-02-04 ENCOUNTER — APPOINTMENT (OUTPATIENT)
Dept: PHYSICAL THERAPY | Age: 77
End: 2022-02-04
Attending: FAMILY MEDICINE
Payer: MEDICARE

## 2022-02-11 ENCOUNTER — APPOINTMENT (OUTPATIENT)
Dept: PHYSICAL THERAPY | Age: 77
End: 2022-02-11
Attending: FAMILY MEDICINE
Payer: MEDICARE

## 2022-02-18 ENCOUNTER — APPOINTMENT (OUTPATIENT)
Dept: PHYSICAL THERAPY | Age: 77
End: 2022-02-18
Attending: FAMILY MEDICINE
Payer: MEDICARE

## 2022-02-23 ENCOUNTER — APPOINTMENT (OUTPATIENT)
Dept: PHYSICAL THERAPY | Age: 77
End: 2022-02-23
Attending: FAMILY MEDICINE
Payer: MEDICARE

## 2022-02-25 ENCOUNTER — APPOINTMENT (OUTPATIENT)
Dept: PHYSICAL THERAPY | Age: 77
End: 2022-02-25
Attending: FAMILY MEDICINE
Payer: MEDICARE

## 2022-03-22 ENCOUNTER — OFFICE VISIT (OUTPATIENT)
Dept: FAMILY MEDICINE CLINIC | Facility: CLINIC | Age: 77
End: 2022-03-22
Payer: MEDICARE

## 2022-03-22 VITALS
BODY MASS INDEX: 38.32 KG/M2 | WEIGHT: 230 LBS | OXYGEN SATURATION: 95 % | DIASTOLIC BLOOD PRESSURE: 82 MMHG | HEIGHT: 65 IN | SYSTOLIC BLOOD PRESSURE: 132 MMHG | TEMPERATURE: 98 F | RESPIRATION RATE: 18 BRPM | HEART RATE: 63 BPM

## 2022-03-22 DIAGNOSIS — G47.33 OSA (OBSTRUCTIVE SLEEP APNEA): Primary | ICD-10-CM

## 2022-03-22 PROCEDURE — 3008F BODY MASS INDEX DOCD: CPT | Performed by: NURSE PRACTITIONER

## 2022-03-22 PROCEDURE — 3075F SYST BP GE 130 - 139MM HG: CPT | Performed by: NURSE PRACTITIONER

## 2022-03-22 PROCEDURE — 3079F DIAST BP 80-89 MM HG: CPT | Performed by: NURSE PRACTITIONER

## 2022-03-22 PROCEDURE — 99214 OFFICE O/P EST MOD 30 MIN: CPT | Performed by: NURSE PRACTITIONER

## 2022-03-28 ENCOUNTER — OFFICE VISIT (OUTPATIENT)
Dept: FAMILY MEDICINE CLINIC | Facility: CLINIC | Age: 77
End: 2022-03-28
Payer: MEDICARE

## 2022-03-28 VITALS
WEIGHT: 234 LBS | SYSTOLIC BLOOD PRESSURE: 120 MMHG | RESPIRATION RATE: 16 BRPM | BODY MASS INDEX: 39 KG/M2 | OXYGEN SATURATION: 98 % | HEART RATE: 61 BPM | TEMPERATURE: 99 F | DIASTOLIC BLOOD PRESSURE: 70 MMHG

## 2022-03-28 DIAGNOSIS — H61.22 HEARING LOSS DUE TO CERUMEN IMPACTION, LEFT: Primary | ICD-10-CM

## 2022-03-28 PROCEDURE — 3078F DIAST BP <80 MM HG: CPT | Performed by: FAMILY MEDICINE

## 2022-03-28 PROCEDURE — 99214 OFFICE O/P EST MOD 30 MIN: CPT | Performed by: FAMILY MEDICINE

## 2022-03-28 PROCEDURE — 3074F SYST BP LT 130 MM HG: CPT | Performed by: FAMILY MEDICINE

## 2022-04-07 ENCOUNTER — TELEPHONE (OUTPATIENT)
Dept: FAMILY MEDICINE CLINIC | Facility: CLINIC | Age: 77
End: 2022-04-07

## 2022-04-07 NOTE — TELEPHONE ENCOUNTER
Spoke with patients . Order was sent on 3/33/22 to 1 Maryana Carrillo. He states its now the [facility].  Will refax order to joan

## 2022-04-27 ENCOUNTER — OFFICE VISIT (OUTPATIENT)
Dept: FAMILY MEDICINE CLINIC | Facility: CLINIC | Age: 77
End: 2022-04-27
Payer: MEDICARE

## 2022-04-27 VITALS
HEART RATE: 58 BPM | DIASTOLIC BLOOD PRESSURE: 74 MMHG | TEMPERATURE: 97 F | RESPIRATION RATE: 16 BRPM | OXYGEN SATURATION: 97 % | SYSTOLIC BLOOD PRESSURE: 130 MMHG

## 2022-04-27 DIAGNOSIS — H10.13 ALLERGIC CONJUNCTIVITIS OF BOTH EYES: Primary | ICD-10-CM

## 2022-04-27 PROCEDURE — 99213 OFFICE O/P EST LOW 20 MIN: CPT | Performed by: FAMILY MEDICINE

## 2022-04-27 PROCEDURE — 3078F DIAST BP <80 MM HG: CPT | Performed by: FAMILY MEDICINE

## 2022-04-27 PROCEDURE — 3075F SYST BP GE 130 - 139MM HG: CPT | Performed by: FAMILY MEDICINE

## 2022-04-27 RX ORDER — KETOTIFEN FUMARATE 0.35 MG/ML
2 SOLUTION/ DROPS OPHTHALMIC 2 TIMES DAILY
Qty: 5 ML | Refills: 0 | Status: SHIPPED | OUTPATIENT
Start: 2022-04-27

## 2022-05-05 ENCOUNTER — OFFICE VISIT (OUTPATIENT)
Dept: FAMILY MEDICINE CLINIC | Facility: CLINIC | Age: 77
End: 2022-05-05
Payer: MEDICARE

## 2022-05-05 VITALS
TEMPERATURE: 100 F | HEART RATE: 58 BPM | OXYGEN SATURATION: 92 % | SYSTOLIC BLOOD PRESSURE: 108 MMHG | HEIGHT: 64.5 IN | DIASTOLIC BLOOD PRESSURE: 62 MMHG | RESPIRATION RATE: 16 BRPM | BODY MASS INDEX: 38.54 KG/M2 | WEIGHT: 228.5 LBS

## 2022-05-05 DIAGNOSIS — E78.5 HYPERLIPIDEMIA, UNSPECIFIED HYPERLIPIDEMIA TYPE: ICD-10-CM

## 2022-05-05 DIAGNOSIS — I48.0 PAROXYSMAL ATRIAL FIBRILLATION (HCC): ICD-10-CM

## 2022-05-05 DIAGNOSIS — Z00.00 ENCOUNTER FOR ANNUAL HEALTH EXAMINATION: Primary | ICD-10-CM

## 2022-05-05 DIAGNOSIS — E66.01 SEVERE OBESITY (BMI 35.0-39.9) WITH COMORBIDITY (HCC): Chronic | ICD-10-CM

## 2022-05-05 DIAGNOSIS — I10 PRIMARY HYPERTENSION: ICD-10-CM

## 2022-05-05 DIAGNOSIS — G47.33 OSA (OBSTRUCTIVE SLEEP APNEA): ICD-10-CM

## 2022-05-05 PROBLEM — C50.919 BREAST CANCER (HCC): Status: RESOLVED | Noted: 2017-03-02 | Resolved: 2022-05-05

## 2022-05-05 PROCEDURE — 96160 PT-FOCUSED HLTH RISK ASSMT: CPT | Performed by: FAMILY MEDICINE

## 2022-05-05 PROCEDURE — G0439 PPPS, SUBSEQ VISIT: HCPCS | Performed by: FAMILY MEDICINE

## 2022-05-05 PROCEDURE — 3078F DIAST BP <80 MM HG: CPT | Performed by: FAMILY MEDICINE

## 2022-05-05 PROCEDURE — 3008F BODY MASS INDEX DOCD: CPT | Performed by: FAMILY MEDICINE

## 2022-05-05 PROCEDURE — 3074F SYST BP LT 130 MM HG: CPT | Performed by: FAMILY MEDICINE

## 2022-05-05 PROCEDURE — 99397 PER PM REEVAL EST PAT 65+ YR: CPT | Performed by: FAMILY MEDICINE

## 2022-05-11 ENCOUNTER — OFFICE VISIT (OUTPATIENT)
Dept: FAMILY MEDICINE CLINIC | Facility: CLINIC | Age: 77
End: 2022-05-11
Payer: MEDICARE

## 2022-05-11 VITALS
DIASTOLIC BLOOD PRESSURE: 66 MMHG | RESPIRATION RATE: 16 BRPM | OXYGEN SATURATION: 97 % | SYSTOLIC BLOOD PRESSURE: 106 MMHG | HEART RATE: 57 BPM | TEMPERATURE: 98 F

## 2022-05-11 DIAGNOSIS — H10.13 ALLERGIC CONJUNCTIVITIS OF BOTH EYES: ICD-10-CM

## 2022-05-11 DIAGNOSIS — I10 PRIMARY HYPERTENSION: Primary | ICD-10-CM

## 2022-05-11 PROCEDURE — 3074F SYST BP LT 130 MM HG: CPT | Performed by: FAMILY MEDICINE

## 2022-05-11 PROCEDURE — 3078F DIAST BP <80 MM HG: CPT | Performed by: FAMILY MEDICINE

## 2022-05-11 PROCEDURE — 99213 OFFICE O/P EST LOW 20 MIN: CPT | Performed by: FAMILY MEDICINE

## 2022-05-12 ENCOUNTER — NURSE ONLY (OUTPATIENT)
Dept: FAMILY MEDICINE CLINIC | Facility: CLINIC | Age: 77
End: 2022-05-12
Payer: MEDICARE

## 2022-05-12 VITALS — DIASTOLIC BLOOD PRESSURE: 76 MMHG | SYSTOLIC BLOOD PRESSURE: 110 MMHG

## 2022-05-12 DIAGNOSIS — I10 PRIMARY HYPERTENSION: Primary | ICD-10-CM

## 2022-05-12 PROCEDURE — 3074F SYST BP LT 130 MM HG: CPT | Performed by: FAMILY MEDICINE

## 2022-05-12 PROCEDURE — 3078F DIAST BP <80 MM HG: CPT | Performed by: FAMILY MEDICINE

## 2022-05-12 NOTE — PROGRESS NOTES
Pt advised. DME script is in the chart.  Pt will call back if she decides she would like to purchase machine through [employer].

## 2022-05-12 NOTE — PROGRESS NOTES
[facility] is here for a BP check. She brought in her BP machine from home. She is unsure how old the machine is, she uses it infrequently. Her BP was 156/83 with her BP machine. Her BP was 110/76 after sitting down for 5 minutes with the office manual BP machine. Advised her that the home BP cuff fits snug and it's probably what's causing a high BP reading. Discussed with Dr. Akash Santos, he agrees. Recommends pt to purchase a new BP machine, a prescription has been written. Pt may also come into office for a bp check (nurse visit) every 3 months since her BP is stable.

## 2022-06-09 ENCOUNTER — OFFICE VISIT (OUTPATIENT)
Dept: HEMATOLOGY/ONCOLOGY | Age: 77
End: 2022-06-09
Attending: INTERNAL MEDICINE
Payer: MEDICARE

## 2022-06-09 VITALS
OXYGEN SATURATION: 91 % | RESPIRATION RATE: 20 BRPM | WEIGHT: 233.63 LBS | HEART RATE: 62 BPM | BODY MASS INDEX: 39 KG/M2 | TEMPERATURE: 96 F | DIASTOLIC BLOOD PRESSURE: 79 MMHG | SYSTOLIC BLOOD PRESSURE: 156 MMHG

## 2022-06-09 DIAGNOSIS — C50.312 MALIGNANT NEOPLASM OF LOWER-INNER QUADRANT OF LEFT FEMALE BREAST, UNSPECIFIED ESTROGEN RECEPTOR STATUS (HCC): Primary | ICD-10-CM

## 2022-06-09 PROCEDURE — 99213 OFFICE O/P EST LOW 20 MIN: CPT | Performed by: INTERNAL MEDICINE

## 2022-06-09 NOTE — PROGRESS NOTES
Outpatient Oncology Care Plan  Problem list:  knowledge deficit    Problems related to:    disease/disease progression    Interventions:  provided general teaching    Expected outcomes:  understands plan of care    Progress towards outcome:  making progress    Education Record    Learner:  Patient  Barriers / Limitations:  None  Method:  Brief focused  Outcome:  Shows understanding  Comments:  PT here for follow up. She will be having a biopsy of her eye lid next week.

## 2022-06-10 ENCOUNTER — OFFICE VISIT (OUTPATIENT)
Dept: FAMILY MEDICINE CLINIC | Facility: CLINIC | Age: 77
End: 2022-06-10
Payer: MEDICARE

## 2022-06-10 VITALS
HEIGHT: 64.5 IN | TEMPERATURE: 97 F | WEIGHT: 231.63 LBS | DIASTOLIC BLOOD PRESSURE: 72 MMHG | RESPIRATION RATE: 18 BRPM | HEART RATE: 57 BPM | SYSTOLIC BLOOD PRESSURE: 132 MMHG | BODY MASS INDEX: 39.07 KG/M2 | OXYGEN SATURATION: 96 %

## 2022-06-10 DIAGNOSIS — G47.33 OSA (OBSTRUCTIVE SLEEP APNEA): Primary | ICD-10-CM

## 2022-06-10 PROCEDURE — 3075F SYST BP GE 130 - 139MM HG: CPT | Performed by: NURSE PRACTITIONER

## 2022-06-10 PROCEDURE — 3078F DIAST BP <80 MM HG: CPT | Performed by: NURSE PRACTITIONER

## 2022-06-10 PROCEDURE — 99214 OFFICE O/P EST MOD 30 MIN: CPT | Performed by: NURSE PRACTITIONER

## 2022-06-10 PROCEDURE — 3008F BODY MASS INDEX DOCD: CPT | Performed by: NURSE PRACTITIONER

## 2022-06-10 RX ORDER — TOBRAMYCIN AND DEXAMETHASONE 3; 1 MG/ML; MG/ML
SUSPENSION/ DROPS OPHTHALMIC
COMMUNITY
Start: 2022-05-16

## 2022-06-10 NOTE — TELEPHONE ENCOUNTER
Pt's  states that machine from [facility] arrived yesterday. He states that with [employer] you sign a contract that you will be renting the machine from them forever and never will own it. They do not want to work with [employer].  They have already called [employer]

## 2022-06-22 NOTE — TELEPHONE ENCOUNTER
Last office visit 4-8-19  Last refill 4-11-18 #90 with 3  Labs were due in October. No future appointments. Left message for patient to call back. [Use of Plain Language] : use of plain language [Adequate] : adequate [None] : none

## 2022-07-27 RX ORDER — SIMVASTATIN 20 MG
20 TABLET ORAL NIGHTLY
Qty: 90 TABLET | Refills: 1 | Status: SHIPPED | OUTPATIENT
Start: 2022-07-27

## 2022-07-27 NOTE — TELEPHONE ENCOUNTER
Cholesterol Medication Protocol Passed 07/27/2022 12:04 PM   Protocol Details  ALT < 80    ALT resulted within past year    Lipid panel within past 12 months    Appointment within past 12 or next 3 months     Last refill - 7/11/21 - #90 with 3 refills  Last ALT - 11/9/21 - 33  Last lipid panel - 11/19/21  Last office visit - 5/11/22  Refilled per protocol

## 2022-08-23 ENCOUNTER — TELEPHONE (OUTPATIENT)
Dept: FAMILY MEDICINE CLINIC | Facility: CLINIC | Age: 77
End: 2022-08-23

## 2022-08-23 DIAGNOSIS — H10.13 ALLERGIC CONJUNCTIVITIS OF BOTH EYES: Primary | ICD-10-CM

## 2022-08-23 DIAGNOSIS — R09.89 SINUS COMPLAINT: ICD-10-CM

## 2022-08-23 NOTE — TELEPHONE ENCOUNTER
PT SAW EYE DR, THEY WOULD LIKE HER TO SEE ENT, COULD A REFERRAL BE GIVEN-    PLEASE ADVISE-THANK YOU

## 2022-08-23 NOTE — TELEPHONE ENCOUNTER
Pt states she followed Dr. Fernando Key recommendation and scheduled with Dr. Andre Calle regarding her red/watery eyes. She was told by Dr. Andre Calle that her sx are probably sinus related b/c she is also having congestion and plugged ears. She was referred to see Dr. Lashawn Melvin (ENT) and requests a referral.    Referral pending, please sign.

## 2022-09-01 ENCOUNTER — TELEPHONE (OUTPATIENT)
Dept: FAMILY MEDICINE CLINIC | Facility: CLINIC | Age: 77
End: 2022-09-01

## 2022-09-01 NOTE — TELEPHONE ENCOUNTER
SHE NEEDS THE REFERRAL FOR DR Donna Gandhi AT [address] [place] [address] . HER APPT IS SCHEDULED FOR 9/6 AT 1:45.      [facility] -[phone]

## 2022-09-01 NOTE — TELEPHONE ENCOUNTER
Message sent to Reilly Barlow to review referral. Referral was originally placed on 8/23/22 and still shows open. Awaiting response.

## 2022-09-03 NOTE — TELEPHONE ENCOUNTER
Pt saw DR Chantal Nassar on 11/7/17 states the zpak is not working,  she has red and watery eyes, sinus pressure and headache,  Runny nose and left ear is painful.  wantst to get something else called in. Pt is in [facility]. [facility]  PH: [phone].    P No

## 2022-12-05 ENCOUNTER — TELEPHONE (OUTPATIENT)
Dept: FAMILY MEDICINE CLINIC | Facility: CLINIC | Age: 77
End: 2022-12-05

## 2022-12-05 DIAGNOSIS — U07.1 COVID-19: Primary | ICD-10-CM

## 2022-12-05 DIAGNOSIS — U07.1 COVID-19: ICD-10-CM

## 2022-12-05 RX ORDER — NIRMATRELVIR AND RITONAVIR 150-100 MG
KIT ORAL
Qty: 20 TABLET | Refills: 0 | Status: SHIPPED | OUTPATIENT
Start: 2022-12-05 | End: 2022-12-05

## 2022-12-05 RX ORDER — NIRMATRELVIR AND RITONAVIR 150-100 MG
KIT ORAL
Qty: 20 TABLET | Refills: 0 | Status: SHIPPED | OUTPATIENT
Start: 2022-12-05 | End: 2022-12-10

## 2022-12-05 NOTE — TELEPHONE ENCOUNTER
I sent to [employer] and message says they may not have in stock---call to verify if they do  If not we can try Karina ardon       May use tylenol or advil  Push fluids  To Emergency Room if very short of breath    I will send charlene to he Karina ralph    Hold simvastatin  And Eliquis while on paxlovid

## 2022-12-05 NOTE — TELEPHONE ENCOUNTER
Covid + as of 2 days ago. Fever over weekend, not now. Cough, Has been using Delsym, not working, mild congestion. Patient is on Eliquis, he wanted me to tell you.

## 2022-12-05 NOTE — TELEPHONE ENCOUNTER
Spoke with patient in regard to Paxlovid, and hold simvastatin while on the medication, verbalized understanding.

## 2023-01-04 ENCOUNTER — HOSPITAL ENCOUNTER (OUTPATIENT)
Dept: MAMMOGRAPHY | Facility: HOSPITAL | Age: 78
Discharge: HOME | End: 2023-01-04
Attending: INTERNAL MEDICINE
Payer: MEDICARE

## 2023-01-04 DIAGNOSIS — C50.312 MALIGNANT NEOPLASM OF LOWER-INNER QUADRANT OF LEFT FEMALE BREAST, UNSPECIFIED ESTROGEN RECEPTOR STATUS (HCC): ICD-10-CM

## 2023-01-04 PROCEDURE — 77066 DX MAMMO INCL CAD BI: CPT | Performed by: INTERNAL MEDICINE

## 2023-01-04 PROCEDURE — 77062 BREAST TOMOSYNTHESIS BI: CPT | Performed by: INTERNAL MEDICINE

## 2023-01-11 ENCOUNTER — OFFICE VISIT (OUTPATIENT)
Dept: HEMATOLOGY/ONCOLOGY | Age: 78
End: 2023-01-11
Attending: INTERNAL MEDICINE
Payer: MEDICARE

## 2023-01-11 VITALS
RESPIRATION RATE: 18 BRPM | TEMPERATURE: 97 F | HEART RATE: 87 BPM | BODY MASS INDEX: 43 KG/M2 | DIASTOLIC BLOOD PRESSURE: 86 MMHG | WEIGHT: 255.69 LBS | OXYGEN SATURATION: 96 % | SYSTOLIC BLOOD PRESSURE: 171 MMHG

## 2023-01-11 DIAGNOSIS — C50.312 MALIGNANT NEOPLASM OF LOWER-INNER QUADRANT OF LEFT FEMALE BREAST, UNSPECIFIED ESTROGEN RECEPTOR STATUS (HCC): Primary | ICD-10-CM

## 2023-01-11 PROCEDURE — 99213 OFFICE O/P EST LOW 20 MIN: CPT | Performed by: INTERNAL MEDICINE

## 2023-01-12 ENCOUNTER — OFFICE VISIT (OUTPATIENT)
Dept: FAMILY MEDICINE CLINIC | Facility: CLINIC | Age: 78
End: 2023-01-12
Payer: MEDICARE

## 2023-01-12 VITALS
HEART RATE: 88 BPM | HEIGHT: 64.5 IN | TEMPERATURE: 97 F | WEIGHT: 255.38 LBS | SYSTOLIC BLOOD PRESSURE: 136 MMHG | BODY MASS INDEX: 43.07 KG/M2 | RESPIRATION RATE: 20 BRPM | OXYGEN SATURATION: 98 % | DIASTOLIC BLOOD PRESSURE: 82 MMHG

## 2023-01-12 DIAGNOSIS — I48.0 PAROXYSMAL ATRIAL FIBRILLATION (HCC): ICD-10-CM

## 2023-01-12 DIAGNOSIS — E55.9 VITAMIN D DEFICIENCY: ICD-10-CM

## 2023-01-12 DIAGNOSIS — E66.01 SEVERE OBESITY (BMI 35.0-39.9) WITH COMORBIDITY (HCC): ICD-10-CM

## 2023-01-12 DIAGNOSIS — G47.33 OSA (OBSTRUCTIVE SLEEP APNEA): Primary | ICD-10-CM

## 2023-01-12 DIAGNOSIS — E78.5 HYPERLIPIDEMIA, UNSPECIFIED HYPERLIPIDEMIA TYPE: ICD-10-CM

## 2023-01-12 PROCEDURE — 3079F DIAST BP 80-89 MM HG: CPT | Performed by: FAMILY MEDICINE

## 2023-01-12 PROCEDURE — 3075F SYST BP GE 130 - 139MM HG: CPT | Performed by: FAMILY MEDICINE

## 2023-01-12 PROCEDURE — 99214 OFFICE O/P EST MOD 30 MIN: CPT | Performed by: FAMILY MEDICINE

## 2023-01-12 PROCEDURE — 3008F BODY MASS INDEX DOCD: CPT | Performed by: FAMILY MEDICINE

## 2023-01-12 RX ORDER — B-COMPLEX WITH VITAMIN C
TABLET ORAL AS DIRECTED
COMMUNITY
End: 2023-01-12

## 2023-01-12 RX ORDER — OMEGA-3 FATTY ACIDS/FISH OIL 300-1000MG
CAPSULE ORAL AS DIRECTED
COMMUNITY
End: 2023-01-12

## 2023-01-12 RX ORDER — AMLODIPINE BESYLATE 5 MG/1
TABLET ORAL
COMMUNITY
Start: 2022-11-03

## 2023-01-12 RX ORDER — LOTEPREDNOL ETABONATE 3.8 MG/G
GEL OPHTHALMIC
COMMUNITY
Start: 2022-08-20 | End: 2023-01-12

## 2023-01-12 RX ORDER — FLUTICASONE PROPIONATE 50 MCG
2 SPRAY, SUSPENSION (ML) NASAL DAILY
COMMUNITY
Start: 2022-10-29

## 2023-01-12 RX ORDER — ACETAMINOPHEN/DIPHENHYDRAMINE 500MG-25MG
TABLET ORAL AS DIRECTED
COMMUNITY
End: 2023-01-12

## 2023-01-12 RX ORDER — FEXOFENADINE HCL 180 MG/1
TABLET ORAL AS DIRECTED
COMMUNITY
End: 2023-01-12

## 2023-01-25 ENCOUNTER — PATIENT OUTREACH (OUTPATIENT)
Dept: FAMILY MEDICINE CLINIC | Facility: CLINIC | Age: 78
End: 2023-01-25

## 2023-01-30 RX ORDER — SIMVASTATIN 20 MG
20 TABLET ORAL NIGHTLY
Qty: 90 TABLET | Refills: 0 | Status: SHIPPED | OUTPATIENT
Start: 2023-01-30

## 2023-02-02 NOTE — PROGRESS NOTES
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 Negative for congestion. Eyes: Negative. Respiratory: Positive for apnea. Cardiovascular: Positive for palpitations. Gastrointestinal: Negative. Endocrine: Negative. Genitourinary: Negative. Musculoskeletal: Negative. adenopathy. Neurological: She is alert and oriented to person, place, and time. She has normal reflexes. Skin: Skin is warm and dry. She is not diaphoretic. Psychiatric: She has a normal mood and affect.  Her behavior is normal. Judgment and thought c  Please excuse any grammatical errors. Call my office if you have any questions regarding this note.  \"     María Elena Dong MD  11/5/2020  10:08 AM with patient

## 2023-03-28 ENCOUNTER — TELEPHONE (OUTPATIENT)
Dept: FAMILY MEDICINE CLINIC | Facility: CLINIC | Age: 78
End: 2023-03-28

## 2023-04-24 ENCOUNTER — TELEPHONE (OUTPATIENT)
Dept: FAMILY MEDICINE CLINIC | Facility: CLINIC | Age: 78
End: 2023-04-24

## 2023-04-24 NOTE — TELEPHONE ENCOUNTER
Per , Britt Meyer, patient's CPAP mask has been filling with condensation lately. They are asking for adjusted setting. Observed [employer] machine was set to 5 for heat and humidity. Reduced to 3. Patient to return call tomorrow if any further trouble.

## 2023-06-24 NOTE — TELEPHONE ENCOUNTER
Last refill #90 on 3/8/2021  Last office visit pertaining to refill on 11/30/2020  Future Appointments   Date Time Provider Cristina Cyndi   6/15/2021 10:50 AM Nuria Vargas MD [facility]     . BP Readings from Last 3 Encounters:  01/29/2
Moderate

## 2023-07-12 ENCOUNTER — OFFICE VISIT (OUTPATIENT)
Dept: HEMATOLOGY/ONCOLOGY | Age: 78
End: 2023-07-12
Attending: INTERNAL MEDICINE
Payer: MEDICARE

## 2023-07-12 VITALS
DIASTOLIC BLOOD PRESSURE: 81 MMHG | TEMPERATURE: 97 F | WEIGHT: 260 LBS | OXYGEN SATURATION: 94 % | SYSTOLIC BLOOD PRESSURE: 159 MMHG | BODY MASS INDEX: 44 KG/M2 | RESPIRATION RATE: 18 BRPM | HEART RATE: 65 BPM

## 2023-07-12 DIAGNOSIS — Z85.3 HISTORY OF BREAST CANCER: Primary | ICD-10-CM

## 2023-07-12 PROCEDURE — 99213 OFFICE O/P EST LOW 20 MIN: CPT | Performed by: INTERNAL MEDICINE

## 2023-07-12 RX ORDER — MULTIVIT WITH MINERALS/LUTEIN
1000 TABLET ORAL DAILY
COMMUNITY

## 2023-07-12 RX ORDER — FEXOFENADINE HCL 180 MG/1
180 TABLET ORAL DAILY
COMMUNITY

## 2023-07-12 NOTE — PROGRESS NOTES
Education Record    Learner:  Patient    Disease / Diagnosis:  left breast ca      Barriers / Limitations:  None   Comments:    Method:  Discussion   Comments:    General Topics:  Plan of care reviewed   Comments:    Outcome:  Shows understanding   Comments:   Breast imaging up to date. Pt feeling well. No breast issues.

## 2023-09-11 ENCOUNTER — LABORATORY ENCOUNTER (OUTPATIENT)
Dept: LAB | Age: 78
End: 2023-09-11
Attending: FAMILY MEDICINE
Payer: MEDICARE

## 2023-09-11 ENCOUNTER — OFFICE VISIT (OUTPATIENT)
Dept: FAMILY MEDICINE CLINIC | Facility: CLINIC | Age: 78
End: 2023-09-11
Payer: MEDICARE

## 2023-09-11 VITALS
WEIGHT: 262.5 LBS | DIASTOLIC BLOOD PRESSURE: 90 MMHG | BODY MASS INDEX: 44.82 KG/M2 | RESPIRATION RATE: 12 BRPM | OXYGEN SATURATION: 98 % | SYSTOLIC BLOOD PRESSURE: 180 MMHG | HEART RATE: 60 BPM | HEIGHT: 64.25 IN | TEMPERATURE: 99 F

## 2023-09-11 DIAGNOSIS — I48.0 PAROXYSMAL ATRIAL FIBRILLATION (HCC): ICD-10-CM

## 2023-09-11 DIAGNOSIS — E66.01 SEVERE OBESITY (BMI 35.0-39.9) WITH COMORBIDITY (HCC): ICD-10-CM

## 2023-09-11 DIAGNOSIS — I10 PRIMARY HYPERTENSION: ICD-10-CM

## 2023-09-11 DIAGNOSIS — E78.5 HYPERLIPIDEMIA, UNSPECIFIED HYPERLIPIDEMIA TYPE: ICD-10-CM

## 2023-09-11 DIAGNOSIS — E66.01 SEVERE OBESITY (BMI >= 40) (HCC): ICD-10-CM

## 2023-09-11 DIAGNOSIS — E55.9 VITAMIN D DEFICIENCY: ICD-10-CM

## 2023-09-11 DIAGNOSIS — Z85.3 HISTORY OF LEFT BREAST CANCER: ICD-10-CM

## 2023-09-11 DIAGNOSIS — G47.33 OSA ON CPAP: ICD-10-CM

## 2023-09-11 DIAGNOSIS — Z00.00 ENCOUNTER FOR ANNUAL HEALTH EXAMINATION: Primary | ICD-10-CM

## 2023-09-11 DIAGNOSIS — I44.7 LEFT BUNDLE BRANCH BLOCK (LBBB): ICD-10-CM

## 2023-09-11 DIAGNOSIS — Z79.899 ENCOUNTER FOR LONG-TERM (CURRENT) USE OF MEDICATIONS: ICD-10-CM

## 2023-09-11 LAB
ALBUMIN SERPL-MCNC: 3.7 G/DL (ref 3.4–5)
ALBUMIN/GLOB SERPL: 1 {RATIO} (ref 1–2)
ALP LIVER SERPL-CCNC: 93 U/L
ALT SERPL-CCNC: 27 U/L
ANION GAP SERPL CALC-SCNC: 5 MMOL/L (ref 0–18)
AST SERPL-CCNC: 18 U/L (ref 15–37)
BASOPHILS # BLD AUTO: 0.05 X10(3) UL (ref 0–0.2)
BASOPHILS NFR BLD AUTO: 0.9 %
BILIRUB SERPL-MCNC: 0.6 MG/DL (ref 0.1–2)
BUN BLD-MCNC: 17 MG/DL (ref 7–18)
CALCIUM BLD-MCNC: 9.2 MG/DL (ref 8.5–10.1)
CHLORIDE SERPL-SCNC: 107 MMOL/L (ref 98–112)
CHOLEST SERPL-MCNC: 184 MG/DL (ref ?–200)
CK SERPL-CCNC: 100 U/L
CO2 SERPL-SCNC: 27 MMOL/L (ref 21–32)
CREAT BLD-MCNC: 0.82 MG/DL
DEPRECATED HBV CORE AB SER IA-ACNC: 142 NG/ML
EGFRCR SERPLBLD CKD-EPI 2021: 74 ML/MIN/1.73M2 (ref 60–?)
EOSINOPHIL # BLD AUTO: 0.12 X10(3) UL (ref 0–0.7)
EOSINOPHIL NFR BLD AUTO: 2.1 %
ERYTHROCYTE [DISTWIDTH] IN BLOOD BY AUTOMATED COUNT: 14.7 %
FASTING PATIENT LIPID ANSWER: YES
FASTING STATUS PATIENT QL REPORTED: YES
GLOBULIN PLAS-MCNC: 3.6 G/DL (ref 2.8–4.4)
GLUCOSE BLD-MCNC: 96 MG/DL (ref 70–99)
HCT VFR BLD AUTO: 43 %
HDLC SERPL-MCNC: 55 MG/DL (ref 40–59)
HGB BLD-MCNC: 14.1 G/DL
IMM GRANULOCYTES # BLD AUTO: 0.01 X10(3) UL (ref 0–1)
IMM GRANULOCYTES NFR BLD: 0.2 %
IRON SATN MFR SERPL: 29 %
IRON SERPL-MCNC: 118 UG/DL
LDLC SERPL CALC-MCNC: 109 MG/DL (ref ?–100)
LYMPHOCYTES # BLD AUTO: 1.98 X10(3) UL (ref 1–4)
LYMPHOCYTES NFR BLD AUTO: 34.1 %
MCH RBC QN AUTO: 30.4 PG (ref 26–34)
MCHC RBC AUTO-ENTMCNC: 32.8 G/DL (ref 31–37)
MCV RBC AUTO: 92.7 FL
MONOCYTES # BLD AUTO: 0.56 X10(3) UL (ref 0.1–1)
MONOCYTES NFR BLD AUTO: 9.7 %
NEUTROPHILS # BLD AUTO: 3.08 X10 (3) UL (ref 1.5–7.7)
NEUTROPHILS # BLD AUTO: 3.08 X10(3) UL (ref 1.5–7.7)
NEUTROPHILS NFR BLD AUTO: 53 %
NONHDLC SERPL-MCNC: 129 MG/DL (ref ?–130)
OSMOLALITY SERPL CALC.SUM OF ELEC: 289 MOSM/KG (ref 275–295)
PLATELET # BLD AUTO: 242 10(3)UL (ref 150–450)
POTASSIUM SERPL-SCNC: 4.1 MMOL/L (ref 3.5–5.1)
PROT SERPL-MCNC: 7.3 G/DL (ref 6.4–8.2)
RBC # BLD AUTO: 4.64 X10(6)UL
SODIUM SERPL-SCNC: 139 MMOL/L (ref 136–145)
TIBC SERPL-MCNC: 414 UG/DL (ref 240–450)
TRANSFERRIN SERPL-MCNC: 278 MG/DL (ref 200–360)
TRIGL SERPL-MCNC: 111 MG/DL (ref 30–149)
TSI SER-ACNC: 1.14 MIU/ML (ref 0.36–3.74)
URATE SERPL-MCNC: 4.9 MG/DL
VIT B12 SERPL-MCNC: 375 PG/ML (ref 193–986)
VIT D+METAB SERPL-MCNC: 37.6 NG/ML (ref 30–100)
VLDLC SERPL CALC-MCNC: 19 MG/DL (ref 0–30)
WBC # BLD AUTO: 5.8 X10(3) UL (ref 4–11)

## 2023-09-11 PROCEDURE — 36415 COLL VENOUS BLD VENIPUNCTURE: CPT

## 2023-09-11 PROCEDURE — 85025 COMPLETE CBC W/AUTO DIFF WBC: CPT

## 2023-09-11 PROCEDURE — 82607 VITAMIN B-12: CPT

## 2023-09-11 PROCEDURE — 82306 VITAMIN D 25 HYDROXY: CPT

## 2023-09-11 PROCEDURE — 82728 ASSAY OF FERRITIN: CPT

## 2023-09-11 PROCEDURE — 80053 COMPREHEN METABOLIC PANEL: CPT

## 2023-09-11 PROCEDURE — 80061 LIPID PANEL: CPT

## 2023-09-11 PROCEDURE — 84443 ASSAY THYROID STIM HORMONE: CPT

## 2023-09-11 PROCEDURE — 82550 ASSAY OF CK (CPK): CPT

## 2023-09-11 PROCEDURE — 83540 ASSAY OF IRON: CPT

## 2023-09-11 PROCEDURE — 83550 IRON BINDING TEST: CPT

## 2023-09-11 PROCEDURE — 84550 ASSAY OF BLOOD/URIC ACID: CPT

## 2023-09-11 RX ORDER — SIMVASTATIN 20 MG
20 TABLET ORAL NIGHTLY
Qty: 90 TABLET | Refills: 3 | Status: SHIPPED | OUTPATIENT
Start: 2023-09-11 | End: 2024-09-05

## 2024-01-08 ENCOUNTER — HOSPITAL ENCOUNTER (OUTPATIENT)
Dept: MAMMOGRAPHY | Facility: HOSPITAL | Age: 79
Discharge: HOME | End: 2024-01-08
Attending: INTERNAL MEDICINE
Payer: MEDICARE

## 2024-01-08 DIAGNOSIS — Z85.3 HISTORY OF BREAST CANCER: ICD-10-CM

## 2024-01-08 PROCEDURE — 77066 DX MAMMO INCL CAD BI: CPT | Performed by: INTERNAL MEDICINE

## 2024-01-08 PROCEDURE — 77062 BREAST TOMOSYNTHESIS BI: CPT | Performed by: INTERNAL MEDICINE

## 2024-01-10 ENCOUNTER — OFFICE VISIT (OUTPATIENT)
Dept: HEMATOLOGY/ONCOLOGY | Age: 79
End: 2024-01-10
Attending: INTERNAL MEDICINE
Payer: MEDICARE

## 2024-01-10 VITALS
SYSTOLIC BLOOD PRESSURE: 127 MMHG | BODY MASS INDEX: 46.44 KG/M2 | TEMPERATURE: 97 F | DIASTOLIC BLOOD PRESSURE: 78 MMHG | RESPIRATION RATE: 20 BRPM | HEIGHT: 64 IN | WEIGHT: 272 LBS | HEART RATE: 69 BPM | OXYGEN SATURATION: 95 %

## 2024-01-10 DIAGNOSIS — Z85.3 HISTORY OF BREAST CANCER: Primary | ICD-10-CM

## 2024-01-10 PROCEDURE — 99213 OFFICE O/P EST LOW 20 MIN: CPT | Performed by: INTERNAL MEDICINE

## 2024-01-10 NOTE — PROGRESS NOTES
Pt here for 6mth MD fu visit for h/o BRCA  Pt reports doing well   No breast issues  Mammogram done on 1/8.  Education Record     Learner:  Patient     Disease / Diagnosis: BRCA     Barriers / Limitations:  none                Comments:     Method:  Discussion                Comments:     General Topics:  Plan of care reviewed                Comments:     Outcome:  Shows understanding                Comments:

## 2024-01-10 NOTE — PROGRESS NOTES
Cancer Center Progress Note  Patient Name: Linda Hernandez   YOB: 1945   Medical Record Number: [id number]   CSN: [id number]   Attending Physician: Patrick Carlisle M.D.       Date of Visit: 1/10/2024          Chief Complaint:  Chief Complaint   Patient presents with    Follow - Up        Oncologic History:  Linda Hernandez is a 78 year old female  referred by Dr. Ureña for ongoing care of her L breast cancer.  The patient reports that she initially had an abnormal mammogram in January, 2016.  She had an ultrasound that confirmed a nodule and a biopsy that revealed a 6mm focus of Infiltrating Ductal Carcinoma. The tumor was ER and NE+, HER-2 negative.  She underwent a L Lumpectomy that removed an additional 6mm nodule of carcinoma with the same markers. The surgical margins were clear.  She completed adjuvant radiation.  Her sister has a history of breast cancer and a BRCA2 gene, but Linda was tested and was negative.  She reports that she was offered endocrine therapy and declined.  She moved to [place] and had one visit with an oncologist there in December. Mammogram was reportedly negative.      History of Present Illness:  Pt is here for follow up. No palpable Masses.     Performance Status:  ECOG 0    Past Medical History:  Past Medical History:   Diagnosis Date    Allergic rhinitis due to allergen 12/27/2017    Breast cancer (HCC) 1/6/16    Invasive Ductal Carcinoma-Grade 1    Calculus of kidney 2009    Cervix dysplasia     LUZ 3 with laser cone March 2010    Essential hypertension     Exposure to medical diagnostic radiation     Hyperlipidemia     Left bundle branch block 11/20/2006    NAFLD (nonalcoholic fatty liver disease)     Diffuse fatty infiltration of the liver on ultrasound August 2011    Pneumonia due to organism 2014    LEFT LUNG    Primary osteoarthritis of both hips 5/16/2017    Primary osteoarthritis of left knee 5/16/2017    Primary osteoarthritis of right  knee 05/16/2017    Pulmonary embolism (HCC) 09/19/2003    Occurred after a long plane ride ON HRT    Visual impairment     READERS       Past Surgical History:  Past Surgical History:   Procedure Laterality Date    CATARACT  10/14/08    Right eye    CATARACT  10/28/08    Left eye    COLONOSCOPY  2016    FRACTURE SURGERY  2/11/93    Fractured L ankle. 1 plate and 8 scres    LUMPECTOMY LEFT  2/3/16    L breast lumpectomy w/sentinel lymph node biopsy and lymph node dissection    OTHER SURGICAL HISTORY  11/19/08    Oral surgery-2 cyst and 2 wisdom teeth    OTHER SURGICAL HISTORY  3/13/09    Kidney stones removed    OTHER SURGICAL HISTORY  1/20/10    Colposcopy    OTHER SURGICAL HISTORY  12/11/12    R eye laser surgery-YAG Capsulotomy    OTHER SURGICAL HISTORY  2/6/15    L eye laser surgery-YAG Capsulotomy    OTHER SURGICAL HISTORY  1/5/16    L breast biopsy    OTHER SURGICAL HISTORY  08/2020    ablation     RADIATION LEFT         Family History:  Family History   Adopted: Yes   Problem Relation Age of Onset    Breast Cancer Self 68    Stroke Mother     Heart Attack Father 38    BRCA gene + Sister     Breast Cancer Sister 50        estimate    Breast Cancer Maternal Grandmother        Social History:  Social History     Socioeconomic History    Marital status:      Spouse name: Not on file    Number of children: Not on file    Years of education: Not on file    Highest education level: Not on file   Occupational History    Not on file   Tobacco Use    Smoking status: Never    Smokeless tobacco: Never   Vaping Use    Vaping Use: Never used   Substance and Sexual Activity    Alcohol use: Yes     Alcohol/week: 0.0 - 4.0 standard drinks of alcohol    Drug use: No    Sexual activity: Not on file   Other Topics Concern    Caffeine Concern Not Asked    Exercise Not Asked    Seat Belt Not Asked    Special Diet Not Asked    Stress Concern Not Asked    Weight Concern Not Asked   Social History Narrative    Not on file      Social Determinants of Health     Financial Resource Strain: Not on file   Food Insecurity: Not on file   Transportation Needs: Not on file   Physical Activity: Not on file   Stress: Not on file   Social Connections: Not on file   Housing Stability: Not on file       Current Medications:    Current Outpatient Medications:     simvastatin 20 MG Oral Tab, Take 1 tablet (20 mg total) by mouth nightly., Disp: 90 tablet, Rfl: 3    NON FORMULARY, Balance of nature fruits and veggies, Disp: , Rfl:     NON FORMULARY, daily. Vit d/K2 - david 5 drops, Disp: , Rfl:     NON FORMULARY, Natural calm- magnesium supplement 1-2 tsp HS prn, Disp: , Rfl:     Metoprolol Succinate  MG Oral Tablet 24 Hr, Take 1 tablet (100 mg total) by mouth daily., Disp: , Rfl:     apixaban 5 MG Oral Tab, Take 1 tablet (5 mg total) by mouth 2 (two) times daily., Disp: , Rfl: 0    Allergies:  Allergies   Allergen Reactions    Sotalol SHORTNESS OF BREATH    Sulfa Antibiotics HIVES, SWELLING, ANGIOEDEMA and OTHER (SEE COMMENTS)     CLASS    Doxycycline ITCHING     Per pt: \"made me feel sick-quit taking\"    Lisinopril Coughing     cough    Valsartan Coughing        Review of Systems:    Constitutional No fevers, chills, night sweats, excessive fatigue or weight loss.   Eyes No significant visual difficulties. No diplopia. No yellowing of the eyes.   Hematologic/Lymphatic No easy bruising or bleeding.  No any tender or palpable lymph nodes.   Respiratory No dyspnea, Pleuritic chest pain, cough or hemoptysis.   Cardiovascular No anginal chest pain, palpitations or orthopnea.   Gastrointestinal No nausea, vomiting, diarrhea, GI bleeding, or constipation.   Genitorurinary  No hematuria, dysuria, or incontinence. No abnormal bleeding.   Integumentary No rashes or yellowing of the skin   Neurologic No headache, blurred vision, and no areas of focal weakness. Normal gait.   Psychiatric No insomnia, depression, nicolas or mood swings.         Vital  Signs:  /78 (BP Location: Right arm, Patient Position: Sitting, Cuff Size: large)   Pulse 69   Temp 96.9 °F (36.1 °C) (Tympanic)   Resp 20   Ht 1.626 m (5' 4\")   Wt 123.4 kg (272 lb)   LMP  (LMP Unknown)   SpO2 95%   BMI 46.69 kg/m²     Physical Examination:    Constitutional Normal - Mood and affect appropriate. Appears close to chronological age. Well nourished. Well developed.   Eyes Normal - Conjunctivae and sclerae are clear and without icterus. Pupils are reactive and equal.   Hematologic/Lymphatic Normal - No petechiae or purpura.  No tender or palpable lymph nodes in the cervical, supraclavicular, axillary or inguinal area.   Respiratory Normal - Lungs are clear to auscultation, no rhonchi or wheezing.   Cardiovascular Normal - Regular rate and rhythm, no murmurs, gallops or rubs.   Abdomen Normal - Non-tender, non-distended, no masses, ascites or hepatosplenomegaly.    Extremities Normal - No visible deformities, no cyanosis, clubbing or edema.    Integumentary Normal - No rashes, No Jaundice   Neurologic Normal - No sensory or motor deficits, normal cerebellar function, normal gait, cranial nerves intact.   Psychiatric Normal - Alert and oriented times three. Coherent speech. Verbalizes understanding of our discussions today.   Breast    Post-surgical changes stable.  No palpable masses    Laboratory:   Latest Reference Range & Units 09/11/23 10:01   Glucose 70 - 99 mg/dL 96   Sodium 136 - 145 mmol/L 139   Potassium 3.5 - 5.1 mmol/L 4.1   Chloride 98 - 112 mmol/L 107   Carbon Dioxide, Total 21.0 - 32.0 mmol/L 27.0   BUN 7 - 18 mg/dL 17   CREATININE 0.55 - 1.02 mg/dL 0.82   CALCIUM 8.5 - 10.1 mg/dL 9.2   EGFR >=60 mL/min/1.73m2 74   ANION GAP 0 - 18 mmol/L 5   CALCULATED OSMOLALITY 275 - 295 mOsm/kg 289   ALKALINE PHOSPHATASE 55 - 142 U/L 93   AST (SGOT) 15 - 37 U/L 18   ALT (SGPT) 13 - 56 U/L 27   Total Bilirubin 0.1 - 2.0 mg/dL 0.6   Globulin 2.8 - 4.4 g/dL 3.6   URIC ACID 2.6 - 6.0 mg/dL  4.9   Iron, Serum 50 - 170 ug/dL 118   Transferrin 200 - 360 mg/dL 278   Iron Bind.Cap.(TIBC) 240 - 450 ug/dL 414   Iron Saturation 15 - 50 % 29   FERRITIN 18.0 - 340.0 ng/mL 142.0   Vitamin B12 193 - 986 pg/mL 375         Radiology:  Mammogram: CONCLUSION:    Developing calcifications at the site of the patient's lumpectomy in the upper inner left breast are likely due to developing fat necrosis.  Precautionary follow-up left breast mammogram with magnification views in 6 months is recommended.      The findings and recommendations were discussed by myself with the patient at the end of the exam.       BI-RADS CATEGORY:    DIAGNOSTIC CATEGORY 3--PROBABLY BENIGN FINDING.         Pathology:    Impression and Plan:  Breast cancer: L breast, lower inner quadrant. Stage Ia (B5fP5T6) ER/CT+, HER-2 Neg. She is s/p lumpectomy and radiation.  She was offered adjuvant endocrine therapy and declined.  We reviewed the statistics indicating reduced risk of recurrence and decreased breast cancer mortality with endocrine therapy.  She has done her own research and believes the benefit to be too small too justify the inconvenience. Her low tumor volume was low risk. KATIE. Next Mammogram due in 6 months.    Planned Follow Up:  6 months      Pre-visit charting and record review: 5 min  Visit time: 15 min  Post-visit chartin min  Total time on the day of service: 25 min      Electronically Signed by:    Patrick Carlisle M.D.  [facility]

## 2024-02-09 ENCOUNTER — TELEPHONE (OUTPATIENT)
Dept: HEMATOLOGY/ONCOLOGY | Facility: HOSPITAL | Age: 79
End: 2024-02-09

## 2024-02-09 DIAGNOSIS — R92.8 ABNORMAL MAMMOGRAM: Primary | ICD-10-CM

## 2024-02-09 NOTE — TELEPHONE ENCOUNTER
Please put a mammogram order in for the patient.  Advise when order is in Epic. Also billing from Dr Carlisle's office was not coded correctly. [employer] [phone].

## 2024-02-28 ENCOUNTER — TELEPHONE (OUTPATIENT)
Dept: FAMILY MEDICINE CLINIC | Facility: CLINIC | Age: 79
End: 2024-02-28

## 2024-04-18 ENCOUNTER — PATIENT OUTREACH (OUTPATIENT)
Dept: FAMILY MEDICINE CLINIC | Facility: CLINIC | Age: 79
End: 2024-04-18

## 2024-07-08 ENCOUNTER — APPOINTMENT (OUTPATIENT)
Dept: HEMATOLOGY/ONCOLOGY | Facility: HOSPITAL | Age: 79
End: 2024-07-08
Attending: INTERNAL MEDICINE
Payer: MEDICARE

## 2024-07-08 ENCOUNTER — HOSPITAL ENCOUNTER (OUTPATIENT)
Dept: MAMMOGRAPHY | Facility: HOSPITAL | Age: 79
Discharge: HOME | End: 2024-07-08
Attending: INTERNAL MEDICINE
Payer: MEDICARE

## 2024-07-08 DIAGNOSIS — R92.8 ABNORMAL MAMMOGRAM: ICD-10-CM

## 2024-07-08 PROCEDURE — 77061 BREAST TOMOSYNTHESIS UNI: CPT | Performed by: INTERNAL MEDICINE

## 2024-07-08 PROCEDURE — 77065 DX MAMMO INCL CAD UNI: CPT | Performed by: INTERNAL MEDICINE

## 2024-07-11 ENCOUNTER — OFFICE VISIT (OUTPATIENT)
Dept: HEMATOLOGY/ONCOLOGY | Age: 79
End: 2024-07-11
Attending: INTERNAL MEDICINE
Payer: MEDICARE

## 2024-07-11 VITALS
HEART RATE: 58 BPM | HEIGHT: 64.02 IN | OXYGEN SATURATION: 97 % | BODY MASS INDEX: 42 KG/M2 | SYSTOLIC BLOOD PRESSURE: 155 MMHG | TEMPERATURE: 97 F | RESPIRATION RATE: 20 BRPM | WEIGHT: 246 LBS | DIASTOLIC BLOOD PRESSURE: 76 MMHG

## 2024-07-11 DIAGNOSIS — R92.8 ABNORMAL MAMMOGRAM: Primary | ICD-10-CM

## 2024-07-11 DIAGNOSIS — Z85.3 HISTORY OF BREAST CANCER: ICD-10-CM

## 2024-07-11 PROCEDURE — 99213 OFFICE O/P EST LOW 20 MIN: CPT | Performed by: INTERNAL MEDICINE

## 2024-07-11 NOTE — PROGRESS NOTES
Cancer Center Progress Note  Patient Name: Linda Hernandez   YOB: 1945   Medical Record Number: [id number]   [facility]: [id number]   Attending Physician: Patrick Carlisle M.D.       Date of Visit: 7/11/2024      Chief Complaint:  Chief Complaint   Patient presents with    Follow - Up        Oncologic History:  Linda Hernandez is a 79 year old female  referred by Dr. Ureña for ongoing care of her L breast cancer.  The patient reports that she initially had an abnormal mammogram in January, 2016.  She had an ultrasound that confirmed a nodule and a biopsy that revealed a 6mm focus of Infiltrating Ductal Carcinoma. The tumor was ER and CT+, HER-2 negative.  She underwent a L Lumpectomy that removed an additional 6mm nodule of carcinoma with the same markers. The surgical margins were clear.  She completed adjuvant radiation.  Her sister has a history of breast cancer and a BRCA2 gene, but Linda was tested and was negative.  She reports that she was offered endocrine therapy and declined.  She moved to [place] and had one visit with an oncologist there in December. Mammogram was reportedly negative.      History of Present Illness:  Pt is here for follow up. No palpable masses. Mammogram appeared benign, but another 6 month was recommended.     Performance Status:  ECOG 0    Past Medical History:  Past Medical History:    Allergic rhinitis due to allergen    Breast cancer (HCC)    Invasive Ductal Carcinoma-Grade 1    Calculus of kidney    Cervix dysplasia    LUZ 3 with laser cone March 2010    Essential hypertension    Exposure to medical diagnostic radiation    Hyperlipidemia    Left bundle branch block    NAFLD (nonalcoholic fatty liver disease)    Diffuse fatty infiltration of the liver on ultrasound August 2011    Pneumonia due to organism    LEFT LUNG    Primary osteoarthritis of both hips    Primary osteoarthritis of left knee    Primary osteoarthritis of right knee    Pulmonary  embolism (HCC)    Occurred after a long plane ride ON HRT    Visual impairment    READERS       Past Surgical History:  Past Surgical History:   Procedure Laterality Date    Cataract  10/14/08    Right eye    Cataract  10/28/08    Left eye    Colonoscopy  2016    Fracture surgery  2/11/93    Fractured L ankle. 1 plate and 8 scres    Lumpectomy left  2/3/16    L breast lumpectomy w/sentinel lymph node biopsy and lymph node dissection    Other surgical history  11/19/08    Oral surgery-2 cyst and 2 wisdom teeth    Other surgical history  3/13/09    Kidney stones removed    Other surgical history  1/20/10    Colposcopy    Other surgical history  12/11/12    R eye laser surgery-YAG Capsulotomy    Other surgical history  2/6/15    L eye laser surgery-YAG Capsulotomy    Other surgical history  1/5/16    L breast biopsy    Other surgical history  08/2020    ablation     Radiation left         Family History:  Family History   Adopted: Yes   Problem Relation Age of Onset    Breast Cancer Self 68    Stroke Mother     Heart Attack Father 38    BRCA gene + Sister     Breast Cancer Sister 50        estimate    Breast Cancer Maternal Grandmother        Social History:  Social History     Socioeconomic History    Marital status:      Spouse name: Not on file    Number of children: Not on file    Years of education: Not on file    Highest education level: Not on file   Occupational History    Not on file   Tobacco Use    Smoking status: Never    Smokeless tobacco: Never   Vaping Use    Vaping status: Never Used   Substance and Sexual Activity    Alcohol use: Yes     Alcohol/week: 0.0 - 4.0 standard drinks of alcohol    Drug use: No    Sexual activity: Not on file   Other Topics Concern    Caffeine Concern Not Asked    Exercise Not Asked    Seat Belt Not Asked    Special Diet Not Asked    Stress Concern Not Asked    Weight Concern Not Asked   Social History Narrative    Not on file     Social Determinants of Health      Financial Resource Strain: Not on file   Food Insecurity: Not on file   Transportation Needs: Not on file   Physical Activity: Not on file   Stress: Not on file   Social Connections: Not on file   Housing Stability: Low Risk  (8/19/2022)    [facility]    Housing Stability     Mortgage Payment Concerns?: Not on file     Number of Places Lived in the Last Year: Not on file     Unstable Housing?: Not on file       Current Medications:    Current Outpatient Medications:     simvastatin 20 MG Oral Tab, Take 1 tablet (20 mg total) by mouth nightly., Disp: 90 tablet, Rfl: 3    NON FORMULARY, Balance of nature fruits and veggies, Disp: , Rfl:     NON FORMULARY, daily. Vit d/K2 - david 5 drops, Disp: , Rfl:     NON FORMULARY, Natural calm- magnesium supplement 1-2 tsp HS prn, Disp: , Rfl:     Metoprolol Succinate  MG Oral Tablet 24 Hr, Take 1 tablet (100 mg total) by mouth daily., Disp: , Rfl:     apixaban 5 MG Oral Tab, Take 1 tablet (5 mg total) by mouth 2 (two) times daily., Disp: , Rfl: 0    Allergies:  Allergies   Allergen Reactions    Sotalol SHORTNESS OF BREATH    Sulfa Antibiotics HIVES, SWELLING, ANGIOEDEMA and OTHER (SEE COMMENTS)     CLASS    Doxycycline ITCHING     Per pt: \"made me feel sick-quit taking\"    Lisinopril Coughing     cough    Valsartan Coughing        Review of Systems:    Constitutional No fevers, chills, night sweats, excessive fatigue or weight loss.   Eyes No significant visual difficulties. No diplopia. No yellowing of the eyes.   Hematologic/Lymphatic No easy bruising or bleeding.  No any tender or palpable lymph nodes.   Respiratory No dyspnea, Pleuritic chest pain, cough or hemoptysis.   Cardiovascular No anginal chest pain, palpitations or orthopnea.   Gastrointestinal No nausea, vomiting, diarrhea, GI bleeding, or constipation.   Genitorurinary  No hematuria, dysuria, or incontinence. No abnormal bleeding.    Integumentary No rashes or yellowing of the skin   Neurologic No headache, blurred vision, and no areas of focal weakness. Normal gait.   Psychiatric No insomnia, depression, nicolas or mood swings.         Vital Signs:  /76 (BP Location: Right arm, Patient Position: Sitting, Cuff Size: large)   Pulse 58   Temp 97.3 °F (36.3 °C) (Tympanic)   Resp 20   Ht 1.626 m (5' 4.02\")   Wt 111.6 kg (246 lb)   LMP  (LMP Unknown)   SpO2 97%   BMI 42.20 kg/m²     Physical Examination:    Constitutional Normal - Mood and affect appropriate. Appears close to chronological age. Well nourished. Well developed.   Eyes Normal - Conjunctivae and sclerae are clear and without icterus. Pupils are reactive and equal.   Hematologic/Lymphatic Normal - No petechiae or purpura.  No tender or palpable lymph nodes in the cervical, supraclavicular, axillary or inguinal area.   Respiratory Normal - Lungs are clear to auscultation, no rhonchi or wheezing.   Cardiovascular Normal - Regular rate and rhythm, no murmurs, gallops or rubs.   Abdomen Normal - Non-tender, non-distended, no masses, ascites or hepatosplenomegaly.    Extremities Normal - No visible deformities, no cyanosis, clubbing or edema.    Integumentary Normal - No rashes, No Jaundice   Neurologic Normal - No sensory or motor deficits, normal cerebellar function, normal gait, cranial nerves intact.   Psychiatric Normal - Alert and oriented times three. Coherent speech. Verbalizes understanding of our discussions today.   Breast    Post-surgical changes stable.  No palpable masses    Laboratory:    Radiology:  Mammogram: FINDINGS:  There are stable benign findings which merit no further evaluation. There are no dominant masses or suspicious clusters of microcalcifications seen. There is no lymphadenopathy or nipple retraction.  There is increase in the coarse benign   dystrophic calcification at the lumpectomy site in the superomedial of posterior left breast.  An additional  six-month diagnostic mammogram is recommended to return the patient to her annual screening schedule.       Pathology:    Impression and Plan:  Breast cancer: L breast, lower inner quadrant. Stage Ia ([id number]) ER/MI+, HER-2 Neg. She is s/p lumpectomy and radiation.  She was offered adjuvant endocrine therapy and declined.  We reviewed the statistics indicating reduced risk of recurrence and decreased breast cancer mortality with endocrine therapy.  She has done her own research and believes the benefit to be too small too justify the inconvenience. Her low tumor volume was low risk. KATIE. Repeat Mammogram was recommended in 6 months.    Planned Follow Up:  6 months      Pre-visit charting and record review: 5 min  Visit time: 15 min  Post-visit chartin min  Total time on the day of service: 25 min      Electronically Signed by:    [facility]

## 2024-07-11 NOTE — PROGRESS NOTES
Pt here for 6 month follow up.  Recent mammogram.  No new complaints.    Outpatient Oncology Care Plan  Problem list:  knowledge deficit    Problems related to:    disease/disease progression    Interventions:  provided general teaching    Expected outcomes:  understands plan of care    Progress towards outcome:  making progress    Education Record    Learner:  Patient  Barriers / Limitations:  None  Method:  Brief focused  Outcome:  Shows understanding  Comments:

## 2024-07-22 RX ORDER — SIMVASTATIN 20 MG
20 TABLET ORAL NIGHTLY
Qty: 90 TABLET | Refills: 3 | Status: SHIPPED | OUTPATIENT
Start: 2024-07-22

## 2024-07-22 NOTE — TELEPHONE ENCOUNTER
SIMVASTATIN 20 MG Oral Tab     Cholesterol Medication Protocol [id number]/22/2024 06:56 AM   Protocol Details ALT < 80    ALT resulted within past year    Lipid panel within past 12 months    In person appointment or virtual visit in the past 12 mos or appointment in next 3 mos      Last office visit:  9/11/23    Future Appointments   Date Time Provider Department Center   8/6/2024  3:00 PM Kofi Blandon MD [facility] [place]   1/9/2025 10:20 AM [facility] RM3  [facility]   1/22/2025  1:30 PM Patrick Carlisle MD PF HEM [facility] [place]   Last filled:  9/11/23  #90 with 3 refills   Last labs:  9/11/23  ALT: 27

## 2024-07-28 NOTE — PROGRESS NOTES
Give lactulose, which she takes for hepatic encephalopathy.     POD #1 Joint newsletter given to patient. Reviewed indications, side effects of pain medication/narcotics and constipation prevention.  Stressed importance of increased fluids/roughage in diet, continued use of stool softeners along with laxatives and suppo

## 2024-08-06 ENCOUNTER — OFFICE VISIT (OUTPATIENT)
Dept: FAMILY MEDICINE CLINIC | Facility: CLINIC | Age: 79
End: 2024-08-06
Payer: MEDICARE

## 2024-08-06 VITALS
HEIGHT: 64 IN | BODY MASS INDEX: 42.34 KG/M2 | DIASTOLIC BLOOD PRESSURE: 70 MMHG | OXYGEN SATURATION: 97 % | SYSTOLIC BLOOD PRESSURE: 136 MMHG | WEIGHT: 248 LBS | RESPIRATION RATE: 12 BRPM | HEART RATE: 61 BPM | TEMPERATURE: 99 F

## 2024-08-06 DIAGNOSIS — E66.01 SEVERE OBESITY (BMI >= 40) (HCC): ICD-10-CM

## 2024-08-06 DIAGNOSIS — I48.0 PAROXYSMAL ATRIAL FIBRILLATION (HCC): ICD-10-CM

## 2024-08-06 DIAGNOSIS — I44.7 LEFT BUNDLE BRANCH BLOCK (LBBB): ICD-10-CM

## 2024-08-06 DIAGNOSIS — H61.22 HEARING LOSS OF LEFT EAR DUE TO CERUMEN IMPACTION: ICD-10-CM

## 2024-08-06 DIAGNOSIS — Z00.00 WELL ADULT EXAM: Primary | ICD-10-CM

## 2024-08-06 DIAGNOSIS — Z12.11 COLON CANCER SCREENING: ICD-10-CM

## 2024-08-06 DIAGNOSIS — I10 PRIMARY HYPERTENSION: ICD-10-CM

## 2024-08-06 DIAGNOSIS — E78.2 MIXED HYPERLIPIDEMIA: ICD-10-CM

## 2024-08-06 PROCEDURE — 1170F FXNL STATUS ASSESSED: CPT | Performed by: FAMILY MEDICINE

## 2024-08-06 PROCEDURE — 3075F SYST BP GE 130 - 139MM HG: CPT | Performed by: FAMILY MEDICINE

## 2024-08-06 PROCEDURE — 96160 PT-FOCUSED HLTH RISK ASSMT: CPT | Performed by: FAMILY MEDICINE

## 2024-08-06 PROCEDURE — 1160F RVW MEDS BY RX/DR IN RCRD: CPT | Performed by: FAMILY MEDICINE

## 2024-08-06 PROCEDURE — 3078F DIAST BP <80 MM HG: CPT | Performed by: FAMILY MEDICINE

## 2024-08-06 PROCEDURE — 3008F BODY MASS INDEX DOCD: CPT | Performed by: FAMILY MEDICINE

## 2024-08-06 PROCEDURE — 1159F MED LIST DOCD IN RCRD: CPT | Performed by: FAMILY MEDICINE

## 2024-08-06 PROCEDURE — G0439 PPPS, SUBSEQ VISIT: HCPCS | Performed by: FAMILY MEDICINE

## 2024-08-06 PROCEDURE — 1126F AMNT PAIN NOTED NONE PRSNT: CPT | Performed by: FAMILY MEDICINE

## 2024-08-09 NOTE — PROGRESS NOTES
Linda Hernandez is a 79 year old female.    CC:    Chief Complaint   Patient presents with    Well Adult     MA SUPERVISIT        Subjective:      Chief Complaint Reviewed and Verified  Nursing Notes Reviewed and   Verified  Tobacco Reviewed  Allergies Reviewed  Medications Reviewed    Problem List Reviewed  Medical History Reviewed  Surgical History   Reviewed  Family History Reviewed           HPI:  Feels well  No complains of  issues  Denies needs    Cholesterol shows Good control and Good compliance. Long term heart-healthy diet and lifestyle discussed and encouraged to reduce risk of cardiovascular disease.  Cholesterol: 184, done on 9/11/2023.  HDL Cholesterol: 55, done on 9/11/2023.  TriGlycerides 111, done on 9/11/2023.  LDL Cholesterol: 109, done on 9/11/2023.   Cholesterol medications include SIMVASTATIN 20 MG Oral Tab [[id number]], SIMVASTATIN 20 MG Oral Tab [[id number]] (Long-Term Med).      Last K was 4.1 done on 9/11/2023.  Last Cr was 0.82 done on 9/11/2023.  Last eGFR was 74 on 9/11/2023.      History/Other:    Allergies:  Allergies   Allergen Reactions    Sotalol SHORTNESS OF BREATH    Sulfa Antibiotics HIVES, SWELLING, ANGIOEDEMA and OTHER (SEE COMMENTS)     CLASS    Doxycycline ITCHING     Per pt: \"made me feel sick-quit taking\"    Lisinopril Coughing     cough    Valsartan Coughing      Current Meds:  has a current medication list which includes the following prescription(s): simvastatin, metoprolol succinate er, apixaban, NON FORMULARY, NON FORMULARY, and NON FORMULARY.       History:  Past Medical History:    Allergic rhinitis due to allergen    Breast cancer (HCC)    Invasive Ductal Carcinoma-Grade 1    Calculus of kidney    Cervix dysplasia    LUZ 3 with laser cone March 2010    Essential hypertension    Exposure to medical diagnostic radiation    Hyperlipidemia    Left bundle branch block    NAFLD (nonalcoholic fatty liver disease)    Diffuse fatty infiltration of the liver  on ultrasound 2011    Pneumonia due to organism    LEFT LUNG    Primary osteoarthritis of both hips    Primary osteoarthritis of left knee    Primary osteoarthritis of right knee    Pulmonary embolism (HCC)    Occurred after a long plane ride ON HRT    Visual impairment    READERS      Past Surgical History:   Procedure Laterality Date    Cataract  10/14/08    Right eye    Cataract  10/28/08    Left eye    Colonoscopy      Fracture surgery  93    Fractured L ankle. 1 plate and 8 scres    Lumpectomy left  2/3/16    L breast lumpectomy w/sentinel lymph node biopsy and lymph node dissection    Other surgical history  08    Oral surgery-2 cyst and 2 wisdom teeth    Other surgical history  3/13/09    Kidney stones removed    Other surgical history  1/20/10    Colposcopy    Other surgical history  12    R eye laser surgery-YAG Capsulotomy    Other surgical history  2/6/15    L eye laser surgery-YAG Capsulotomy    Other surgical history  16    L breast biopsy    Other surgical history  2020    ablation     Radiation left        Family History   Adopted: Yes   Problem Relation Age of Onset    Breast Cancer Self 68    Stroke Mother     Heart Attack Father 38    BRCA gene + Sister     Breast Cancer Sister 50        estimate    Breast Cancer Maternal Grandmother       Family Status   Relation Status    Self (Not Specified)    Mo     Fa     Sis Alive    MGMA (Not Specified)      Social History     Socioeconomic History    Marital status:    Tobacco Use    Smoking status: Never    Smokeless tobacco: Never   Vaping Use    Vaping status: Never Used   Substance and Sexual Activity    Alcohol use: Yes     Alcohol/week: 0.0 - 4.0 standard drinks of alcohol    Drug use: No     Social Determinants of Health      [facility]    Housing Stability            Immunization History   Administered Date(s) Administered     FLU VAC High Dose 65 YRS & Older PRSV Free (28202) 01/13/2020    FLULAVAL 6 months & older 0.5 ml Prefilled syringe (49714) 12/27/2017    Influenza 10/16/2018    Pneumococcal (Prevnar 13) 10/20/2015    Pneumovax 23 10/16/2013    TDAP 12/27/2017    Zoster Vaccine Live (Zostavax) 09/24/2007    Zoster Vaccine Recombinant Adjuvanted (Shingrix) 04/11/2018, 09/07/2018           Wt Readings from Last 6 Encounters:   08/06/24 248 lb (112.5 kg)   07/11/24 246 lb (111.6 kg)   01/10/24 272 lb (123.4 kg)   09/11/23 262 lb 8 oz (119.1 kg)   07/12/23 260 lb (117.9 kg)   01/12/23 255 lb 6.4 oz (115.8 kg)       BP Readings from Last 3 Encounters:   08/06/24 136/70   07/11/24 155/76   01/10/24 127/78     REVIEW OF SYSTEMS:   GENERAL: feels well otherwise  SKIN: denies any unusual skin lesions  EYES:denies blurred vision or double vision  HEENT: denies nasal congestion, sinus pain or ST  LUNGS: denies shortness of breath with exertion  CARDIOVASCULAR: denies chest pain on exertion  GI: denies abdominal pain,denies heartburn   : denies dysuria or changes in stream or incontinence  MUSCULOSKELETAL: denies back pain  NEURO: denies headaches  PSYCHE: denies depression or anxiety  HEMATOLOGIC: denies hx of anemia  Objective:    EXAM:   Blood pressure 136/70, pulse 61, temperature 99.3 °F (37.4 °C), temperature source Temporal, resp. rate 12, height 5' 4\" (1.626 m), weight 248 lb (112.5 kg), SpO2 97%, not currently breastfeeding.  Body mass index is 42.57 kg/m².  No LMP recorded (lmp unknown). Patient is postmenopausal.    Reviewed by Dr Blandon    GENERAL: well developed, well nourished,in no apparent distress  SKIN: no rashes,no suspicious lesions  HEENT: atraumatic, normocephalic,ears and throat are clear  EYES:PERRL, EOMI, conjunctiva are clear  NECK: supple,no adenopathy,no bruits  CHEST: no chest tenderness  BREAST: defer  LUNGS: clear to auscultation  CARDIO: RRR without murmur  GI: good BS's,no masses, HSM or tenderness  :  defer  RECTAL:defer  MUSCULOSKELETAL: back is not tender,FROM of the back  EXTREMITIES: no cyanosis, clubbing or edema  NEURO: Oriented times three,cranial nerves are intact,motor and sensory are grossly intact      Assessment & Plan:       ASSESSMENT:  1. Well adult exam (Primary)  2. Severe obesity (BMI >= 40) ([facility])  Overview:  Estimated body mass index is 42.57 kg/m² as calculated from the following:    Height as of this encounter: 5' 4\" (1.626 m).    Weight as of this encounter: 248 lb (112.5 kg).    Assessment & Plan:  Discussed weight loss through caloric reduction and aerobic exercise    3. Paroxysmal atrial fibrillation ([facility])  Overview:  On Eliquis and sees Dr Avalos regularly  Assessment & Plan:   Stable    [x] chronic stable condition, noted historically, continues present status    4. Colon cancer screening  -     GASTRO - INTERNAL  5. Hearing loss of left ear due to cerumen impaction  6. Primary hypertension  Overview:  Blood Pressure and Cardiac Medications            Metoprolol Succinate  MG Oral Tablet 24 Hr            Assessment & Plan:  Last K was 4.1 done on 9/11/2023.  Last Cr was 0.82 done on 9/11/2023.  Last eGFR was 74 on 9/11/2023.    Orders:  -     Cancel: Comp Metabolic Panel (14); Future; Expected date: 08/06/2024  -     Cancel: Comp Metabolic Panel (14)  -     Comp Metabolic Panel (14)  7. Mixed hyperlipidemia  Assessment & Plan:  Cholesterol shows Good control. Long term heart-healthy diet and lifestyle discussed and encouraged to reduce risk of cardiovascular disease.  Cholesterol: 184, done on 9/11/2023.  HDL Cholesterol: 55, done on 9/11/2023.  TriGlycerides 111, done on 9/11/2023.  LDL Cholesterol: 109, done on 9/11/2023.   Cholesterol medications include SIMVASTATIN 20 MG Oral Tab [[id number]], SIMVASTATIN 20 MG Oral Tab [[id number]] (Long-Term Med).      Orders:  -     Cancel: Lipid Panel; Future; Expected date: 08/06/2024  -     Cancel: Comp Metabolic Panel (14); Future;  Expected date: 08/06/2024  -     Cancel: Lipid Panel  -     Cancel: Comp Metabolic Panel (14)  -     Lipid Panel  -     Comp Metabolic Panel (14)  8. Left bundle branch block (LBBB)  Assessment & Plan:   Stable    [x] chronic stable condition, noted historically, continues present status              Meds & Refills for this Visit:  Requested Prescriptions      No prescriptions requested or ordered in this encounter

## 2024-08-09 NOTE — ASSESSMENT & PLAN NOTE
Last K was 4.1 done on 9/11/2023.  Last Cr was 0.82 done on 9/11/2023.  Last eGFR was 74 on 9/11/2023.

## 2024-08-09 NOTE — ASSESSMENT & PLAN NOTE
Cholesterol shows Good control. Long term heart-healthy diet and lifestyle discussed and encouraged to reduce risk of cardiovascular disease.  Cholesterol: 184, done on 9/11/2023.  HDL Cholesterol: 55, done on 9/11/2023.  TriGlycerides 111, done on 9/11/2023.  LDL Cholesterol: 109, done on 9/11/2023.   Cholesterol medications include SIMVASTATIN 20 MG Oral Tab [[id number]], SIMVASTATIN 20 MG Oral Tab [[id number]] (Long-Term Med).

## 2024-08-21 NOTE — PROGRESS NOTES
Deanne Escobar is a 72year old female. Patient presents with:   Other: F/U AFTER RIGHT KNEE SURGERY-- ISSUES WITH A-FIB DURING/AFTER SURGERY--- RM 1      HPI:Date of Admission: 6/27/2018   Disposition: [facility]                 Date of 90 tablet Rfl: 3     No current facility-administered medications on file prior to visit.       Past Medical History:   Diagnosis Date   • Allergic rhinitis due to allergen 12/27/2017   • Arrhythmia     LBBB   • Breast cancer ([facility] [address] 1/6/16    Invasive Ductal Smokeless tobacco: Never Used                      Alcohol use:  No                 REVIEW OF SYSTEMS:   GENERAL HEALTH: feels well otherwise  SKIN: denies any unusual skin lesions or rashes  RESPIRATORY: denies shortness of breath   CARDIOVA Final   • RBC 06/28/2018 3.41* 3.80 - 5.10 x10(6)uL Final   • HGB 06/28/2018 10.5* 12.0 - 16.0 g/dL Final   • HCT 06/28/2018 31.7* 34.0 - 50.0 % Final   • PLT 06/28/2018 234.0  150.0 - 450.0 10(3)uL Final   • MCV 06/28/2018 93.0  81.0 - 100.0 fL Final   • Neutrophil % 06/28/2018 66.8  % Final   • Lymphocyte % 06/28/2018 21.0  % Final   • Monocyte % 06/28/2018 11.5  % Final   • Eosinophil % 06/28/2018 0.1  % Final   • Basophil % 06/28/2018 0.2  % Final   • Immature Granulocyte % 06/28/2018 0.4  % Final   • V as well as prognosis and expectations. No orders of the defined types were placed in this encounter.       Meds & Refills for this Visit:  No prescriptions requested or ordered in this encounter    Imaging & Consults:  OP REFERRAL TO CARDIOLOGIST eyeglasses

## 2024-08-31 LAB
ALBUMIN/GLOBULIN RATIO: 1.5 (CALC) (ref 1–2.5)
ALBUMIN: 4.1 G/DL (ref 3.6–5.1)
ALKALINE PHOSPHATASE: 108 U/L (ref 37–153)
ALT: 22 U/L (ref 6–29)
AST: 22 U/L (ref 10–35)
BILIRUBIN, TOTAL: 0.5 MG/DL (ref 0.2–1.2)
BUN: 18 MG/DL (ref 7–25)
CALCIUM: 9.4 MG/DL (ref 8.6–10.4)
CARBON DIOXIDE: 27 MMOL/L (ref 20–32)
CHLORIDE: 103 MMOL/L (ref 98–110)
CHOL/HDLC RATIO: 3.4 (CALC)
CHOLESTEROL, TOTAL: 183 MG/DL
CREATININE: 0.67 MG/DL (ref 0.6–1)
EGFR: 90 ML/MIN/1.73M2
GLOBULIN: 2.7 G/DL (CALC) (ref 1.9–3.7)
GLUCOSE: 90 MG/DL (ref 65–99)
HDL CHOLESTEROL: 54 MG/DL
LDL-CHOLESTEROL: 105 MG/DL (CALC)
NON-HDL CHOLESTEROL: 129 MG/DL (CALC)
POTASSIUM: 4.8 MMOL/L (ref 3.5–5.3)
PROTEIN, TOTAL: 6.8 G/DL (ref 6.1–8.1)
SODIUM: 138 MMOL/L (ref 135–146)
TRIGLYCERIDES: 142 MG/DL

## 2024-09-13 ENCOUNTER — TELEPHONE (OUTPATIENT)
Dept: HEMATOLOGY/ONCOLOGY | Age: 79
End: 2024-09-13

## 2024-09-13 NOTE — TELEPHONE ENCOUNTER
Patient is calling asking where she should go next for basal cell carcinoma. Patient is asking Dr. Carlisle for guidance on what steps to take next. Patient is reachable at [phone]. Called 9/13/24 GA

## 2024-09-24 DIAGNOSIS — E78.2 MIXED HYPERLIPIDEMIA: Primary | ICD-10-CM

## 2024-09-24 DIAGNOSIS — Z79.899 ENCOUNTER FOR LONG-TERM (CURRENT) USE OF MEDICATIONS: ICD-10-CM

## 2024-09-24 DIAGNOSIS — Z00.00 ENCOUNTER FOR ANNUAL HEALTH EXAMINATION: ICD-10-CM

## 2024-09-24 DIAGNOSIS — E78.5 HYPERLIPIDEMIA, UNSPECIFIED HYPERLIPIDEMIA TYPE: ICD-10-CM

## 2025-01-09 ENCOUNTER — HOSPITAL ENCOUNTER (OUTPATIENT)
Dept: MAMMOGRAPHY | Facility: HOSPITAL | Age: 80
Discharge: HOME | End: 2025-01-09
Attending: INTERNAL MEDICINE
Payer: MEDICARE

## 2025-01-09 DIAGNOSIS — Z85.3 HISTORY OF BREAST CANCER: ICD-10-CM

## 2025-01-09 DIAGNOSIS — R92.8 ABNORMAL MAMMOGRAM: ICD-10-CM

## 2025-01-09 PROCEDURE — 77066 DX MAMMO INCL CAD BI: CPT | Performed by: INTERNAL MEDICINE

## 2025-01-09 PROCEDURE — 77062 BREAST TOMOSYNTHESIS BI: CPT | Performed by: INTERNAL MEDICINE

## 2025-01-22 ENCOUNTER — OFFICE VISIT (OUTPATIENT)
Age: 80
End: 2025-01-22
Attending: INTERNAL MEDICINE
Payer: MEDICARE

## 2025-01-22 VITALS
SYSTOLIC BLOOD PRESSURE: 147 MMHG | TEMPERATURE: 98 F | BODY MASS INDEX: 44.16 KG/M2 | WEIGHT: 258.69 LBS | DIASTOLIC BLOOD PRESSURE: 75 MMHG | OXYGEN SATURATION: 95 % | HEART RATE: 58 BPM | RESPIRATION RATE: 20 BRPM | HEIGHT: 64.02 IN

## 2025-01-22 DIAGNOSIS — C44.311 BASAL CELL CARCINOMA (BCC) OF SKIN OF NOSE: ICD-10-CM

## 2025-01-22 DIAGNOSIS — R92.8 ABNORMAL MAMMOGRAM: Primary | ICD-10-CM

## 2025-01-22 DIAGNOSIS — Z85.3 HISTORY OF BREAST CANCER: ICD-10-CM

## 2025-01-22 NOTE — PROGRESS NOTES
Cancer Center Progress Note  Patient Name: Linda Hernandez   YOB: 1945   Medical Record Number: [id number]   [facility]: [id number]   Attending Physician: Patrick Carlisle M.D.       Date of Visit: 1/22/2025        Chief Complaint:  Chief Complaint   Patient presents with    Cancer     Follow up with         Oncologic History:  Linda Hernandez is a 79 year old female  referred by Dr. Ureña for ongoing care of her L breast cancer.  The patient reports that she initially had an abnormal mammogram in January, 2016.  She had an ultrasound that confirmed a nodule and a biopsy that revealed a 6mm focus of Infiltrating Ductal Carcinoma. The tumor was ER and CT+, HER-2 negative.  She underwent a L Lumpectomy that removed an additional 6mm nodule of carcinoma with the same markers. The surgical margins were clear.  She completed adjuvant radiation.  Her sister has a history of breast cancer and a BRCA2 gene, but Linda was tested and was negative.  She reports that she was offered endocrine therapy and declined.  She moved to [place] and had one visit with an oncologist there in December. Mammogram was reportedly negative.      History of Present Illness:  Pt is here for follow up. No palpable masses. Mammogram appeared benign, but another 6 month was recommended.   She had a basal cell cancer removed from her nose. It was a traumatic experience for her.     Performance Status:  ECOG 0    Past Medical History:  Past Medical History:    Allergic rhinitis due to allergen    Breast cancer (HCC)    Invasive Ductal Carcinoma-Grade 1    Calculus of kidney    Cervix dysplasia    LUZ 3 with laser cone March 2010    Essential hypertension    Exposure to medical diagnostic radiation    Hyperlipidemia    Left bundle branch block    NAFLD (nonalcoholic fatty liver disease)    Diffuse fatty infiltration of the liver on ultrasound August 2011    Pneumonia due to organism    LEFT LUNG    Primary  osteoarthritis of both hips    Primary osteoarthritis of left knee    Primary osteoarthritis of right knee    Pulmonary embolism (HCC)    Occurred after a long plane ride ON HRT    Visual impairment    READERS       Past Surgical History:  Past Surgical History:   Procedure Laterality Date    Cataract  10/14/08    Right eye    Cataract  10/28/08    Left eye    Colonoscopy  2016    Fracture surgery  2/11/93    Fractured L ankle. 1 plate and 8 scres    Lumpectomy left  2/3/16    L breast lumpectomy w/sentinel lymph node biopsy and lymph node dissection    Other surgical history  11/19/08    Oral surgery-2 cyst and 2 wisdom teeth    Other surgical history  3/13/09    Kidney stones removed    Other surgical history  1/20/10    Colposcopy    Other surgical history  12/11/12    R eye laser surgery-YAG Capsulotomy    Other surgical history  2/6/15    L eye laser surgery-YAG Capsulotomy    Other surgical history  1/5/16    L breast biopsy    Other surgical history  08/2020    ablation     Radiation left         Family History:  Family History   Adopted: Yes   Problem Relation Age of Onset    Breast Cancer Self 68    Stroke Mother     Heart Attack Father 38    BRCA gene + Sister     Breast Cancer Sister 50        estimate    Breast Cancer Maternal Grandmother        Social History:  Social History     Socioeconomic History    Marital status:      Spouse name: Not on file    Number of children: Not on file    Years of education: Not on file    Highest education level: Not on file   Occupational History    Not on file   Tobacco Use    Smoking status: Never    Smokeless tobacco: Never   Vaping Use    Vaping status: Never Used   Substance and Sexual Activity    Alcohol use: Yes     Alcohol/week: 0.0 - 4.0 standard drinks of alcohol    Drug use: No    Sexual activity: Not on file   Other Topics Concern    Caffeine Concern Not Asked    Exercise Not Asked    Seat Belt Not Asked    Special Diet Not Asked    Stress Concern Not  Asked    Weight Concern Not Asked   Social History Narrative    Not on file     Social Drivers of Health     Financial Resource Strain: Not on file   Food Insecurity: Not on file   Transportation Needs: Not on file   Physical Activity: Not on file   Stress: Not on file   Social Connections: Not on file   Housing Stability: Low Risk  (8/19/2022)    Received [facility], [facility]    Housing Stability     Mortgage Payment Concerns?: Not on file     Number of Places Lived in the Last Year: Not on file     Unstable Housing?: Not on file       Current Medications:    Current Outpatient Medications:     SIMVASTATIN 20 MG Oral Tab, TAKE 1 TABLET EVERY NIGHT, Disp: 90 tablet, Rfl: 3    NON FORMULARY, Balance of nature fruits and veggies, Disp: , Rfl:     NON FORMULARY, daily. Vit d/K2 - david 5 drops, Disp: , Rfl:     NON FORMULARY, Natural calm- magnesium supplement 1-2 tsp HS prn, Disp: , Rfl:     Metoprolol Succinate  MG Oral Tablet 24 Hr, Take 1 tablet (100 mg total) by mouth daily., Disp: , Rfl:     apixaban 5 MG Oral Tab, Take 1 tablet (5 mg total) by mouth 2 (two) times daily., Disp: , Rfl: 0    Allergies:  Allergies   Allergen Reactions    Sotalol SHORTNESS OF BREATH    Sulfa Antibiotics HIVES, SWELLING, ANGIOEDEMA and OTHER (SEE COMMENTS)     CLASS    Doxycycline ITCHING     Per pt: \"made me feel sick-quit taking\"    Lisinopril Coughing     cough    Valsartan Coughing        Review of Systems:    Constitutional No fevers, chills, night sweats, excessive fatigue or weight loss.   Eyes No significant visual difficulties. No diplopia. No yellowing of the eyes.   Hematologic/Lymphatic No easy bruising or bleeding.  No any tender or palpable lymph nodes.   Respiratory No dyspnea, Pleuritic chest pain, cough or hemoptysis.   Cardiovascular No anginal chest pain, palpitations or orthopnea.   Gastrointestinal No nausea, vomiting, diarrhea, GI bleeding, or constipation.    Genitorurinary  No hematuria, dysuria, or incontinence. No abnormal bleeding.   Integumentary No rashes or yellowing of the skin   Neurologic No headache, blurred vision, and no areas of focal weakness. Normal gait.   Psychiatric No insomnia, depression, nicolas or mood swings.         Vital Signs:  /75 (BP Location: Right arm, Patient Position: Sitting, Cuff Size: large)   Pulse 58   Temp 97.5 °F (36.4 °C) (Tympanic)   Resp 20   Ht 1.626 m (5' 4.02\")   Wt 117.3 kg (258 lb 11.2 oz)   LMP  (LMP Unknown)   SpO2 95%   BMI 44.38 kg/m²     Physical Examination:    Constitutional Normal - Mood and affect appropriate. Appears close to chronological age. Well nourished. Well developed.   Eyes Normal - Conjunctivae and sclerae are clear and without icterus. Pupils are reactive and equal.   Hematologic/Lymphatic Normal - No petechiae or purpura.  No tender or palpable lymph nodes in the cervical, supraclavicular, axillary or inguinal area.   Respiratory Normal - Lungs are clear to auscultation, no rhonchi or wheezing.   Cardiovascular Normal - Regular rate and rhythm, no murmurs, gallops or rubs.   Abdomen Normal - Non-tender, non-distended, no masses, ascites or hepatosplenomegaly.    Extremities Normal - No visible deformities, no cyanosis, clubbing or edema.    Integumentary Normal - No rashes, No Jaundice   Neurologic Normal - No sensory or motor deficits, normal cerebellar function, normal gait, cranial nerves intact.   Psychiatric Normal - Alert and oriented times three. Coherent speech. Verbalizes understanding of our discussions today.   Breast    Post-surgical changes stable.  No palpable masses    Laboratory:    Radiology:  Mammogram: CONCLUSION:  Probably benign appearing dystrophic calcifications in the upper-outer quadrant left breast at the site of lumpectomy.  Recommend repeat magnification views and left breast mammogram in 6 months.      Stable parenchymal pattern right breast.           Pathology:    Impression and Plan:  Breast cancer: L breast, lower inner quadrant. Stage Ia ([id number]) ER/NV+, HER-2 Neg. She is s/p lumpectomy and radiation.  She was offered adjuvant endocrine therapy and declined.  We reviewed the statistics indicating reduced risk of recurrence and decreased breast cancer mortality with endocrine therapy.  She has done her own research and believes the benefit to be too small too justify the inconvenience. Her low tumor volume was low risk. KATIE. Repeat Mammogram was recommended in 6 months.    Basal cell carcinoma: We discussed the importance of ongoing skin exams and establishing a long term relationship with a dermatologist that she trusts.     Planned Follow Up:  6 months    The patient and I have a longitudinal relationship to address/treat this serious and complex condition      Electronically Signed by:    Patrick Carlisle M.D.  [facility]

## 2025-01-22 NOTE — PROGRESS NOTES
Patient is here today for 6 month follow up with  for Breast Cancer. Patient denies pain. Patient following up with Dermatology for Basal Cell Cancer. Feels good otherwise. Medication listand medical history  were reviewed and updated.     Education Record    Learner:  Patient      Disease / Diagnosis: Breast Cancer    Barriers / Limitations:  None   Comments:    Method:  Brief focused, Discussion, Printed material and Reinforcement   Comments:    General Topics:  Medication, Pain, Procedure and Plan of care reviewed   Comments:    Outcome:  Shows understanding   Comments:    AVS provided and follow up reviewed.  Patient instructed to call as needed.

## 2025-02-26 ENCOUNTER — TELEPHONE (OUTPATIENT)
Dept: FAMILY MEDICINE CLINIC | Facility: CLINIC | Age: 80
End: 2025-02-26

## 2025-04-04 ENCOUNTER — OFFICE VISIT (OUTPATIENT)
Dept: FAMILY MEDICINE CLINIC | Facility: CLINIC | Age: 80
End: 2025-04-04
Payer: MEDICARE

## 2025-04-04 VITALS
RESPIRATION RATE: 22 BRPM | SYSTOLIC BLOOD PRESSURE: 136 MMHG | OXYGEN SATURATION: 97 % | TEMPERATURE: 98 F | BODY MASS INDEX: 43 KG/M2 | HEART RATE: 63 BPM | WEIGHT: 251.5 LBS | DIASTOLIC BLOOD PRESSURE: 80 MMHG
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
## 2025-04-04 DIAGNOSIS — S43.402A SPRAIN OF LEFT SHOULDER, UNSPECIFIED SHOULDER SPRAIN TYPE, INITIAL ENCOUNTER: Primary | ICD-10-CM

## 2025-04-04 DIAGNOSIS — M62.830 SPASM OF LEFT TRAPEZIUS MUSCLE: ICD-10-CM

## 2025-04-04 PROCEDURE — 1160F RVW MEDS BY RX/DR IN RCRD: CPT | Performed by: FAMILY MEDICINE

## 2025-04-04 PROCEDURE — 3079F DIAST BP 80-89 MM HG: CPT | Performed by: FAMILY MEDICINE

## 2025-04-04 PROCEDURE — 1159F MED LIST DOCD IN RCRD: CPT | Performed by: FAMILY MEDICINE

## 2025-04-04 PROCEDURE — 99213 OFFICE O/P EST LOW 20 MIN: CPT | Performed by: FAMILY MEDICINE

## 2025-04-04 PROCEDURE — 1170F FXNL STATUS ASSESSED: CPT | Performed by: FAMILY MEDICINE

## 2025-04-04 PROCEDURE — 3075F SYST BP GE 130 - 139MM HG: CPT | Performed by: FAMILY MEDICINE

## 2025-04-04 NOTE — PROGRESS NOTES
Linda Hernandez is a 79 year old female who presents for Shoulder Pain       History/Other:   History of Present Illness  Linda Hernandez is a 79 year old female who presents with left shoulder pain.    She has been experiencing left shoulder pain for the past two to three months. The pain is intermittent, with some days being more bothersome than others. It is located behind the shoulder blade and sometimes radiates to the upper trapezius area. Movements such as lifting her arm to the side and putting on a coat are particularly challenging, although there has been some improvement in her range of motion over time. Weather changes, particularly rain, seem to affect her symptoms.    Approximately a month ago, she fell on ice, landing on her buttocks, and used her left arm to support herself while getting into a car. She is uncertain if this incident exacerbated her shoulder pain, which had already been present prior to the fall.    She has not been taking any regular medication for the shoulder pain but occasionally uses Tylenol when the pain is severe. She is unable to take NSAIDs like Advil due to her use of Eliquis. Her current medications include metoprolol, which was recently reduced from 100 mg to 50 mg due to shortness of breath, and Eliquis, which restricts her use of certain pain medications.    She has a granddaughter in [place] and two great-grandchildren, one in [place] and another in [place]. She has lived in various locations, including a dozen years in a smoking area, and has family scattered across the country.   Chief Complaint Reviewed and Verified  No Further Nursing Notes to   Review  Tobacco Reviewed  Allergies Reviewed  Medications Reviewed    Problem List Reviewed  Medical History Reviewed  Surgical History   Reviewed  Family History Reviewed         Tobacco:  She has never smoked tobacco.    Current Outpatient Medications   Medication Sig Dispense Refill    SIMVASTATIN  20 MG Oral Tab TAKE 1 TABLET EVERY NIGHT 90 tablet 3    NON FORMULARY Balance of nature fruits and veggies      NON FORMULARY daily. Vit d/K2 - david 5 drops      NON FORMULARY Natural calm- magnesium supplement 1-2 tsp HS prn      Metoprolol Succinate  MG Oral Tablet 24 Hr Take 1 tablet (100 mg total) by mouth daily.      apixaban 5 MG Oral Tab Take 1 tablet (5 mg total) by mouth 2 (two) times daily.  0         Review of Systems:  Pertinent items are noted in HPI.      Objective:   /80   Pulse 63   Temp 98.4 °F (36.9 °C) (Temporal)   Resp 22   Wt 251 lb 8 oz (114.1 kg)   LMP  (LMP Unknown)   SpO2 97%   BMI 43.15 kg/m²  Estimated body mass index is 43.15 kg/m² as calculated from the following:    Height as of 1/22/25: 5' 4.02\" (1.626 m).    Weight as of this encounter: 251 lb 8 oz (114.1 kg).  Results       Physical Exam  MUSCULOSKELETAL: Tenderness in the left shoulder and upper trapezius on palpation. Tightness in the left shoulder joint and trapezius muscle. Normal range of motion in the left shoulder with some discomfort on elevation.   is normal   can lift arm forward  but restricted to 70 degree adduct      Assessment & Plan:   1. Sprain of left shoulder, unspecified shoulder sprain type, initial encounter (Primary)  2. Spasm of left trapezius muscle  3. Fall due to slipping on ice or snow, initial encounter    Assessment & Plan  Left shoulder pain  Chronic pain likely due to muscle tightness and mild rotator cuff involvement. No fracture or severe tear. NSAIDs contraindicated due to Eliquis; Tylenol recommended.  - Recommend heat therapy for muscle tightness.  - Advise Tylenol for pain relief, starting with regular strength, increasing to extra strength if needed.  - Encourage gentle range of motion exercises to improve mobility.  - Consider physical therapy if no improvement.    Medication management  Metoprolol reduced to 50 mg due to shortness of breath. Eliquis use contraindicates  NSAIDs; Tylenol recommended for pain relief.  - Continue metoprolol at 50 mg.  - Avoid NSAIDs; use Tylenol for pain management.        No follow-ups on file.        Kofi Blandon MD, 4/4/2025, 10:29 AM

## 2025-04-04 NOTE — PROGRESS NOTES
The following individual(s) verbally consented to be recorded using ambient AI listening technology and understand that they can each withdraw their consent to this listening technology at any point by asking the clinician to turn off or pause the recording:    Patient name: Linda Cha Hernandez

## 2025-04-22 ENCOUNTER — TELEPHONE (OUTPATIENT)
Dept: FAMILY MEDICINE CLINIC | Facility: CLINIC | Age: 80
End: 2025-04-22

## 2025-04-22 DIAGNOSIS — J40 SINOBRONCHITIS: ICD-10-CM

## 2025-04-22 DIAGNOSIS — M62.830 SPASM OF LEFT TRAPEZIUS MUSCLE: ICD-10-CM

## 2025-04-22 DIAGNOSIS — J32.9 SINOBRONCHITIS: ICD-10-CM

## 2025-04-22 DIAGNOSIS — S43.402S SPRAIN OF LEFT SHOULDER, UNSPECIFIED SHOULDER SPRAIN TYPE, SEQUELA: Primary | ICD-10-CM
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
## 2025-04-22 RX ORDER — CEPHALEXIN 500 MG/1
500 CAPSULE ORAL 3 TIMES DAILY
Qty: 30 CAPSULE | Refills: 0 | Status: SHIPPED | OUTPATIENT
Start: 2025-04-22 | End: 2025-04-23 | Stop reason: SINTOL

## 2025-04-23 ENCOUNTER — TELEPHONE (OUTPATIENT)
Dept: FAMILY MEDICINE CLINIC | Facility: CLINIC | Age: 80
End: 2025-04-23

## 2025-04-23 DIAGNOSIS — J40 SINOBRONCHITIS: Primary | ICD-10-CM

## 2025-04-23 DIAGNOSIS — J32.9 SINOBRONCHITIS: Primary | ICD-10-CM

## 2025-04-23 RX ORDER — AZITHROMYCIN 250 MG/1
TABLET, FILM COATED ORAL
Qty: 6 TABLET | Refills: 0 | Status: SHIPPED | OUTPATIENT
Start: 2025-04-23 | End: 2025-04-28

## 2025-04-23 NOTE — TELEPHONE ENCOUNTER
Spoke with  who states patient was prescribed Cephalexin and after she took her first dose yesterday she felt very hot and sweaty. States she started coughing more, and felt this was an allergic reaction to the Cephalexin.  states she tolerated a Zpak well in the past and is wondering if she can get this sent in instead as she is not comfortable continuing the Cephalexin. Please advise.

## 2025-04-23 NOTE — TELEPHONE ENCOUNTER
Patient was given medication yesterday.  She said it is making her cough & runny nose worse.  She didn't take it today.  She wanted to discuss.

## 2025-05-01 ENCOUNTER — NURSE TRIAGE (OUTPATIENT)
Dept: FAMILY MEDICINE CLINIC | Facility: CLINIC | Age: 80
End: 2025-05-01

## 2025-05-01 NOTE — TELEPHONE ENCOUNTER
Reason for Disposition   Patient wants to be seen    Answer Assessment - Initial Assessment Questions  1. TYPE OF FOREIGN BODY: \"What got in the eye?\"       Unknown  2. ONSET: \"When did it happen?\"       Woke up this morning with issue  3. MECHANISM: \"How did it happen?\"       Unknown  4. VISION: \"Do you have blurred vision?\"       No  5. PAIN: \"Is it painful?\" If Yes, ask: \"How bad is the pain?\"  (Scale 1-10; or mild, moderate, severe)      Not painful, uncomfortable  6. CONTACTS: \"Do you wear contacts?\"      No  7. OTHER SYMPTOMS: \"Do you have any other symptoms?\"      No  8. PREGNANCY: \"Is there any chance you are pregnant?\" \"When was your last menstrual period?\"      NA    Protocols used: Eye - Foreign Body-A-[place]    Patient has appointment at [facility] today at 5:30, she agrees to reach back out [facility] office and speak with someone in clinical department to see if can get in sooner. She will go to ER with worsening symptoms if needed prior to appointment.

## 2025-05-01 NOTE — TELEPHONE ENCOUNTER
Lt eye started this morning, it feels like there is something in the eye.  She cannot see anything.  Maybe a little swollen.  She is wondering if she should go to the ER [facility]  Or if she should see Dr. Blandon?  Please advise.  Has used eye drops a couple times but not helping.  Supposed to leave Saturday morning for a week and a half.

## 2025-06-25 ENCOUNTER — TELEPHONE (OUTPATIENT)
Age: 80
End: 2025-06-25

## 2025-06-25 NOTE — TELEPHONE ENCOUNTER
Patient is calling to obtain new order for Mammogram to schedule in August. She not sure what side she should be getting as a follow up.      Please contact patient to confirm.

## 2025-06-25 NOTE — TELEPHONE ENCOUNTER
Patient notified that the order for the mammogram was placed today and she can call central scheduling to schedule at her convenience.

## 2025-06-26 RX ORDER — SIMVASTATIN 20 MG
20 TABLET ORAL NIGHTLY
Qty: 90 TABLET | Refills: 3 | Status: SHIPPED | OUTPATIENT
Start: 2025-06-26

## 2025-06-26 NOTE — TELEPHONE ENCOUNTER
Last refill: 07/22/24  Qty 90  W/ 3 refills  Last ov: 04/04/25  Last lab 08/30/24    Requested Prescriptions     Pending Prescriptions Disp Refills    SIMVASTATIN 20 MG Oral Tab [Pharmacy Med Name: Simvastatin Oral Tablet 20 MG] 90 tablet 3     Sig: TAKE 1 TABLET EVERY NIGHT     No future appointments.

## 2025-06-30 ENCOUNTER — TELEPHONE (OUTPATIENT)
Dept: FAMILY MEDICINE CLINIC | Facility: CLINIC | Age: 80
End: 2025-06-30

## 2025-06-30 NOTE — TELEPHONE ENCOUNTER
Patient given scheduling phone number for physical therapy and aware to pick location that is most convenient for her. Agrees to check with insurance regarding coverage and will call back with any further questions or concerns.

## 2025-06-30 NOTE — TELEPHONE ENCOUNTER
Dr. Blandon told patient she needed physical therapy and patient ready to fulfil. - she said she apologizes for the delay she was traveling she is ready to get information on where to go. Please advise   Linda   [phone]

## 2025-07-07 ENCOUNTER — TELEPHONE (OUTPATIENT)
Dept: FAMILY MEDICINE CLINIC | Facility: CLINIC | Age: 80
End: 2025-07-07

## 2025-07-07 NOTE — TELEPHONE ENCOUNTER
[facility] calling on behalf of patient, requesting physical therapy referral be faxed to authorization department FAX: [phone]

## 2025-07-07 NOTE — TELEPHONE ENCOUNTER
Spoke with patient and she gave verbal authorization to fax the information requested.    Referral faxed as requested to provided number.

## 2025-07-11 LAB
AMB EXT BILIRUBIN, TOTAL: 1.3 MG/DL
AMB EXT BUN: 19 MG/DL
AMB EXT CALCIUM: 9.2
AMB EXT CARBON DIOXIDE: 27
AMB EXT CHLORIDE: 106
AMB EXT CMP ALT: 44 U/L
AMB EXT CMP AST: 43 U/L
AMB EXT CREATININE: 0.7 MG/DL
AMB EXT EGFR NON-AA: 80.9
AMB EXT GLUCOSE: 90 MG/DL
AMB EXT POSTASSIUM: 4.5 MMOL/L
AMB EXT SODIUM: 141 MMOL/L
AMB EXT TOTAL PROTEIN: 7
AMB EXT TSH: 1.69 MIU/ML

## 2025-07-12 ENCOUNTER — APPOINTMENT (OUTPATIENT)
Dept: GENERAL RADIOLOGY | Facility: HOSPITAL | Age: 80
End: 2025-07-12
Attending: EMERGENCY MEDICINE
Payer: MEDICARE

## 2025-07-12 ENCOUNTER — HOSPITAL ENCOUNTER (INPATIENT)
Facility: HOSPITAL | Age: 80
LOS: 4 days | Discharge: HOME | End: 2025-07-16
Attending: EMERGENCY MEDICINE | Admitting: INTERNAL MEDICINE
Payer: MEDICARE

## 2025-07-12 ENCOUNTER — HOSPITAL ENCOUNTER (INPATIENT)
Facility: HOSPITAL | Age: 80
LOS: 4 days | Discharge: HOME | DRG: 286 | End: 2025-07-16
Attending: EMERGENCY MEDICINE | Admitting: INTERNAL MEDICINE
Payer: MEDICARE

## 2025-07-12 ENCOUNTER — APPOINTMENT (OUTPATIENT)
Dept: GENERAL RADIOLOGY | Facility: HOSPITAL | Age: 80
DRG: 286 | End: 2025-07-12
Attending: EMERGENCY MEDICINE
Payer: MEDICARE

## 2025-07-12 DIAGNOSIS — R03.0 ELEVATED BLOOD PRESSURE READING: ICD-10-CM

## 2025-07-12 DIAGNOSIS — R07.9 EXERTIONAL CHEST PAIN: ICD-10-CM

## 2025-07-12 DIAGNOSIS — R79.89 ELEVATED BRAIN NATRIURETIC PEPTIDE (BNP) LEVEL: ICD-10-CM

## 2025-07-12 DIAGNOSIS — I50.21 ACUTE HFREF (HEART FAILURE WITH REDUCED EJECTION FRACTION) (HCC): ICD-10-CM

## 2025-07-12 DIAGNOSIS — R06.09 DOE (DYSPNEA ON EXERTION): Primary | ICD-10-CM

## 2025-07-12 LAB
ALBUMIN SERPL-MCNC: 4.5 G/DL (ref 3.2–4.8)
ALBUMIN/GLOB SERPL: 1.4 (ref 1–2)
ALP LIVER SERPL-CCNC: 96 U/L (ref 55–142)
ALT SERPL-CCNC: 49 U/L (ref 10–49)
ANION GAP SERPL CALC-SCNC: 10 MMOL/L (ref 0–18)
APTT PPP: 28.9 SECONDS (ref 23–36)
AST SERPL-CCNC: 41 U/L (ref ?–34)
BASOPHILS # BLD AUTO: 0.06 X10(3) UL (ref 0–0.2)
BASOPHILS NFR BLD AUTO: 0.8 %
BILIRUB SERPL-MCNC: 1 MG/DL (ref 0.2–1.1)
BUN BLD-MCNC: 20 MG/DL (ref 9–23)
CALCIUM BLD-MCNC: 10.3 MG/DL (ref 8.7–10.6)
CHLORIDE SERPL-SCNC: 105 MMOL/L (ref 98–112)
CO2 SERPL-SCNC: 26 MMOL/L (ref 21–32)
CREAT BLD-MCNC: 1.1 MG/DL (ref 0.55–1.02)
EGFRCR SERPLBLD CKD-EPI 2021: 51 ML/MIN/1.73M2 (ref 60–?)
EOSINOPHIL # BLD AUTO: 0.13 X10(3) UL (ref 0–0.7)
EOSINOPHIL NFR BLD AUTO: 1.7 %
ERYTHROCYTE [DISTWIDTH] IN BLOOD BY AUTOMATED COUNT: 15.1 %
GLOBULIN PLAS-MCNC: 3.2 G/DL (ref 2–3.5)
GLUCOSE BLD-MCNC: 102 MG/DL (ref 70–99)
HCT VFR BLD AUTO: 43.4 % (ref 35–48)
HGB BLD-MCNC: 14.8 G/DL (ref 12–16)
IMM GRANULOCYTES # BLD AUTO: 0.01 X10(3) UL (ref 0–1)
IMM GRANULOCYTES NFR BLD: 0.1 %
INR BLD: 1.18 (ref 0.8–1.2)
LYMPHOCYTES # BLD AUTO: 2.33 X10(3) UL (ref 1–4)
LYMPHOCYTES NFR BLD AUTO: 30.7 %
MCH RBC QN AUTO: 30.7 PG (ref 26–34)
MCHC RBC AUTO-ENTMCNC: 34.1 G/DL (ref 31–37)
MCV RBC AUTO: 90 FL (ref 80–100)
MONOCYTES # BLD AUTO: 0.71 X10(3) UL (ref 0.1–1)
MONOCYTES NFR BLD AUTO: 9.4 %
NEUTROPHILS # BLD AUTO: 4.34 X10 (3) UL (ref 1.5–7.7)
NEUTROPHILS # BLD AUTO: 4.34 X10(3) UL (ref 1.5–7.7)
NEUTROPHILS NFR BLD AUTO: 57.3 %
NT-PROBNP SERPL-MCNC: 3081 PG/ML (ref ?–450)
OSMOLALITY SERPL CALC.SUM OF ELEC: 295 MOSM/KG (ref 275–295)
PLATELET # BLD AUTO: 260 10(3)UL (ref 150–450)
POTASSIUM SERPL-SCNC: 3.6 MMOL/L (ref 3.5–5.1)
PROT SERPL-MCNC: 7.7 G/DL (ref 5.7–8.2)
PROTHROMBIN TIME: 15.2 SECONDS (ref 11.6–14.8)
RBC # BLD AUTO: 4.82 X10(6)UL (ref 3.8–5.3)
SODIUM SERPL-SCNC: 141 MMOL/L (ref 136–145)
TROPONIN I SERPL HS-MCNC: 9 NG/L (ref ?–34)
WBC # BLD AUTO: 7.6 X10(3) UL (ref 4–11)

## 2025-07-12 PROCEDURE — 99223 1ST HOSP IP/OBS HIGH 75: CPT | Performed by: INTERNAL MEDICINE

## 2025-07-12 PROCEDURE — 71045 X-RAY EXAM CHEST 1 VIEW: CPT | Performed by: EMERGENCY MEDICINE

## 2025-07-12 RX ORDER — METOPROLOL SUCCINATE 50 MG/1
50 TABLET, EXTENDED RELEASE ORAL
Status: DISCONTINUED | OUTPATIENT
Start: 2025-07-13 | End: 2025-07-12

## 2025-07-12 RX ORDER — ACETAMINOPHEN 500 MG
1000 TABLET ORAL EVERY 8 HOURS PRN
Status: DISCONTINUED | OUTPATIENT
Start: 2025-07-12 | End: 2025-07-16

## 2025-07-12 RX ORDER — BENZONATATE 100 MG/1
200 CAPSULE ORAL 3 TIMES DAILY PRN
Status: DISCONTINUED | OUTPATIENT
Start: 2025-07-12 | End: 2025-07-16

## 2025-07-12 RX ORDER — METOPROLOL SUCCINATE 50 MG/1
50 TABLET, EXTENDED RELEASE ORAL NIGHTLY
Status: DISCONTINUED | OUTPATIENT
Start: 2025-07-12 | End: 2025-07-16

## 2025-07-12 RX ORDER — POTASSIUM CHLORIDE 1500 MG/1
40 TABLET, EXTENDED RELEASE ORAL EVERY 4 HOURS
Status: COMPLETED | OUTPATIENT
Start: 2025-07-12 | End: 2025-07-12

## 2025-07-12 RX ORDER — POLYETHYLENE GLYCOL 3350 17 G/17G
17 POWDER, FOR SOLUTION ORAL DAILY PRN
Status: DISCONTINUED | OUTPATIENT
Start: 2025-07-12 | End: 2025-07-16

## 2025-07-12 RX ORDER — SODIUM PHOSPHATE, DIBASIC AND SODIUM PHOSPHATE, MONOBASIC 7; 19 G/230ML; G/230ML
1 ENEMA RECTAL ONCE AS NEEDED
Status: DISCONTINUED | OUTPATIENT
Start: 2025-07-12 | End: 2025-07-16

## 2025-07-12 RX ORDER — METOCLOPRAMIDE HYDROCHLORIDE 5 MG/ML
10 INJECTION INTRAMUSCULAR; INTRAVENOUS EVERY 8 HOURS PRN
Status: DISCONTINUED | OUTPATIENT
Start: 2025-07-12 | End: 2025-07-16

## 2025-07-12 RX ORDER — ECHINACEA PURPUREA EXTRACT 125 MG
1 TABLET ORAL
Status: DISCONTINUED | OUTPATIENT
Start: 2025-07-12 | End: 2025-07-16

## 2025-07-12 RX ORDER — TORSEMIDE 20 MG/1
20 TABLET ORAL DAILY
Status: ON HOLD | COMMUNITY
Start: 2025-07-11 | End: 2025-07-16

## 2025-07-12 RX ORDER — BISACODYL 10 MG
10 SUPPOSITORY, RECTAL RECTAL
Status: DISCONTINUED | OUTPATIENT
Start: 2025-07-12 | End: 2025-07-16

## 2025-07-12 RX ORDER — ATORVASTATIN CALCIUM 10 MG/1
10 TABLET, FILM COATED ORAL NIGHTLY
Status: DISCONTINUED | OUTPATIENT
Start: 2025-07-12 | End: 2025-07-16

## 2025-07-12 RX ORDER — ONDANSETRON 2 MG/ML
4 INJECTION INTRAMUSCULAR; INTRAVENOUS EVERY 6 HOURS PRN
Status: DISCONTINUED | OUTPATIENT
Start: 2025-07-12 | End: 2025-07-16

## 2025-07-12 RX ORDER — SENNOSIDES 8.6 MG
17.2 TABLET ORAL NIGHTLY PRN
Status: DISCONTINUED | OUTPATIENT
Start: 2025-07-12 | End: 2025-07-16

## 2025-07-12 RX ORDER — FUROSEMIDE 10 MG/ML
40 INJECTION INTRAMUSCULAR; INTRAVENOUS ONCE
Status: COMPLETED | OUTPATIENT
Start: 2025-07-12 | End: 2025-07-12

## 2025-07-12 NOTE — ED INITIAL ASSESSMENT (HPI)
Problem: Adult Inpatient Plan of Care  Goal: Plan of Care Review  Outcome: Ongoing, Progressing  Goal: Patient-Specific Goal (Individualized)  Outcome: Ongoing, Progressing  Goal: Absence of Hospital-Acquired Illness or Injury  Outcome: Ongoing, Progressing  Goal: Optimal Comfort and Wellbeing  Outcome: Ongoing, Progressing  Goal: Readiness for Transition of Care  Outcome: Ongoing, Progressing     Problem: Skin Injury Risk Increased  Goal: Skin Health and Integrity  Outcome: Ongoing, Progressing     Problem: Infection  Goal: Absence of Infection Signs and Symptoms  Outcome: Ongoing, Progressing     Problem: Impaired Wound Healing  Goal: Optimal Wound Healing  Outcome: Ongoing, Progressing     Problem: Fall Injury Risk  Goal: Absence of Fall and Fall-Related Injury  Outcome: Ongoing, Progressing     Problem: Communication Impairment (Mechanical Ventilation, Invasive)  Goal: Effective Communication  Outcome: Ongoing, Progressing     Problem: Device-Related Complication Risk (Mechanical Ventilation, Invasive)  Goal: Optimal Device Function  Outcome: Ongoing, Progressing     Problem: Inability to Wean (Mechanical Ventilation, Invasive)  Goal: Mechanical Ventilation Liberation  Outcome: Ongoing, Progressing     Problem: Nutrition Impairment (Mechanical Ventilation, Invasive)  Goal: Optimal Nutrition Delivery  Outcome: Ongoing, Progressing     Problem: Skin and Tissue Injury (Mechanical Ventilation, Invasive)  Goal: Absence of Device-Related Skin and Tissue Injury  Outcome: Ongoing, Progressing     Problem: Ventilator-Induced Lung Injury (Mechanical Ventilation, Invasive)  Goal: Absence of Ventilator-Induced Lung Injury  Outcome: Ongoing, Progressing     Problem: Communication Impairment (Artificial Airway)  Goal: Effective Communication  Outcome: Ongoing, Progressing     Problem: Device-Related Complication Risk (Artificial Airway)  Goal: Optimal Device Function  Outcome: Ongoing, Progressing     Problem: Skin and  Pt states she is frequently getting out of breath for the last few months.  Pt also noted having chest pains with it.  Pt denies coughing.  Pt states noting swelling to bilateral legs and ankles.     Tissue Injury (Artificial Airway)  Goal: Absence of Device-Related Skin or Tissue Injury  Outcome: Ongoing, Progressing

## 2025-07-12 NOTE — PLAN OF CARE
Problem: SAFETY ADULT - FALL  Goal: Free from fall injury  Description: INTERVENTIONS:  - Assess pt frequently for physical needs  - Identify cognitive and physical deficits and behaviors that affect risk of falls.  - [place] fall precautions as indicated by assessment.  - Educate pt/family on patient safety including physical limitations  - Instruct pt to call for assistance with activity based on assessment  - Modify environment to reduce risk of injury  - Provide assistive devices as appropriate  - Consider OT/PT consult to assist with strengthening/mobility  - Encourage toileting schedule  7/12/2025 1836 by Anayeli Jenkins RN  Outcome: Progressing  7/12/2025 1836 by Anayeli Jenkins RN  Outcome: Progressing     Problem: CARDIOVASCULAR - ADULT  Goal: Maintains optimal cardiac output and hemodynamic stability  Description: INTERVENTIONS:  - Monitor vital signs, rhythm, and trends  - Monitor for bleeding, hypotension and signs of decreased cardiac output  - Evaluate effectiveness of vasoactive medications to optimize hemodynamic stability  - Monitor arterial and/or venous puncture sites for bleeding and/or hematoma  - Assess quality of pulses, skin color and temperature  - Assess for signs of decreased coronary artery perfusion - ex. Angina  - Evaluate fluid balance, assess for edema, trend weights  7/12/2025 1836 by Anayeli Jenkins RN  Outcome: Progressing  7/12/2025 1836 by Anayeli Jenkins RN  Outcome: Progressing  Goal: Absence of cardiac arrhythmias or at baseline  Description: INTERVENTIONS:  - Continuous cardiac monitoring, monitor vital signs, obtain 12 lead EKG if indicated  - Evaluate effectiveness of antiarrhythmic and heart rate control medications as ordered  - Initiate emergency measures for life threatening arrhythmias  - Monitor electrolytes and administer replacement therapy as ordered  7/12/2025 1836 by Anayeli Jenkins RN  Outcome: Progressing  7/12/2025 1836 by Alice  Anayeli GARRISON RN  Outcome: Progressing     Problem: RESPIRATORY - ADULT  Goal: Achieves optimal ventilation and oxygenation  Description: INTERVENTIONS:  - Assess for changes in respiratory status  - Assess for changes in mentation and behavior  - Position to facilitate oxygenation and minimize respiratory effort  - Oxygen supplementation based on oxygen saturation or ABGs  - Provide Smoking Cessation handout, if applicable  - Encourage broncho-pulmonary hygiene including cough, deep breathe, Incentive Spirometry  - Assess the need for suctioning and perform as needed  - Assess and instruct to report SOB or any respiratory difficulty  - Respiratory Therapy support as indicated  - Manage/alleviate anxiety  - Monitor for signs/symptoms of CO2 retention  7/12/2025 1836 by Anayeli Jenkins, RN  Outcome: Progressing  7/12/2025 1836 by Anayeli Jenkins RN  Outcome: Progressing     Problem: METABOLIC/FLUID AND ELECTROLYTES - ADULT  Goal: Electrolytes maintained within normal limits  Description: INTERVENTIONS:  - Monitor labs and rhythm and assess patient for signs and symptoms of electrolyte imbalances  - Administer electrolyte replacement as ordered  - Monitor response to electrolyte replacements, including rhythm and repeat lab results as appropriate  - Fluid restriction as ordered  - Instruct patient on fluid and nutrition restrictions as appropriate  7/12/2025 1836 by Anayeli Jenkins, RN  Outcome: Progressing  7/12/2025 1836 by Anayeli Jenkins, RN  Outcome: Progressing

## 2025-07-12 NOTE — PROGRESS NOTES
07/12/25 1749 07/12/25 1752 07/12/25 1753   Vital Signs   Temp src Oral  --   --    Pulse 97 99 92   Heart Rate Source Monitor Monitor Monitor   Resp 16  --   --    Respiratory Quality Normal  --   --    /82 143/76 127/80   MAP (mmHg) 98 96 94   BP Location Right arm Right arm Right arm   BP Method Automatic Automatic Automatic   Patient Position Lying Sitting Standing

## 2025-07-12 NOTE — PLAN OF CARE
NURSING ADMISSION NOTE      Patient admitted via Cart  Oriented to room.  Safety precautions initiated.  Bed in low position.  Call light in reach.  Accompanied by ,  Skin assessment completed, RN /PCT  Plan of care discussed,

## 2025-07-12 NOTE — ED PROVIDER NOTES
80-year-old female, history of  Patient Seen in: [facility] Emergency Department        History  Chief Complaint   Patient presents with    Difficulty Breathing     can't catch breath, arm pain, a fib, and retaining water,     Stated Complaint: can't catch breath, arm pain, a fib, and retaining water,    Subjective:   80-year-old female, history of hypertension, history of A-fib on Eliquis, presents with weight gain, dyspnea on exertion, orthopnea, chest pain with exertion ongoing for several months, worse in the past couple of weeks.  Went to [facility] yesterday, was seen, told she needed outpatient echo and possibly CT scan in the next couple of weeks, states her symptoms are not getting any better so she came here today for evaluation.  No current pain.  States when she exerts her self she can barely walk 10 feet without getting short of breath.  She has been compliant with her anticoagulation.  Reports unintentional weight gain.  Placed orthopnea.  No known history of CHF.  Last echo looks echo was years ago with an EF 55%.  Had an ablation for A-fib in 2020                      Objective:     Past Medical History:    Allergic rhinitis due to allergen    Breast cancer (HCC)    Invasive Ductal Carcinoma-Grade 1    Calculus of kidney    Cervix dysplasia    LUZ 3 with laser cone March 2010    Essential hypertension    Exposure to medical diagnostic radiation    Hyperlipidemia    Left bundle branch block    NAFLD (nonalcoholic fatty liver disease)    Diffuse fatty infiltration of the liver on ultrasound August 2011    Pneumonia due to organism    LEFT LUNG    Primary osteoarthritis of both hips    Primary osteoarthritis of left knee    Primary osteoarthritis of right knee    Pulmonary embolism (HCC)    Occurred after a long plane ride ON HRT    Visual impairment    READERS              Past Surgical History:   Procedure Laterality Date    Cataract  10/14/08    Right eye    Cataract  10/28/08    Left eye     Colonoscopy  2016    Fracture surgery  2/11/93    Fractured L ankle. 1 plate and 8 scres    Lumpectomy left  2/3/16    L breast lumpectomy w/sentinel lymph node biopsy and lymph node dissection    Other surgical history  11/19/08    Oral surgery-2 cyst and 2 wisdom teeth    Other surgical history  3/13/09    Kidney stones removed    Other surgical history  1/20/10    Colposcopy    Other surgical history  12/11/12    R eye laser surgery-YAG Capsulotomy    Other surgical history  2/6/15    L eye laser surgery-YAG Capsulotomy    Other surgical history  1/5/16    L breast biopsy    Other surgical history  08/2020    ablation     Radiation left                  Social History     Socioeconomic History    Marital status:    Tobacco Use    Smoking status: Never    Smokeless tobacco: Never   Vaping Use    Vaping status: Never Used   Substance and Sexual Activity    Alcohol use: Yes     Alcohol/week: 0.0 - 4.0 standard drinks of alcohol    Drug use: No     Social Drivers of Health      [facility]    Housing Stability                                Physical Exam    ED Triage Vitals [07/12/25 1526]   BP (!) 165/100   Pulse 58   Resp 16   Temp 98.7 °F (37.1 °C)   Temp src Temporal   SpO2 93 %   O2 Device None (Room air)       Current Vitals:   Vital Signs  BP: 128/67  Pulse: 79  Resp: 16  Temp: 98.7 °F (37.1 °C)  Temp src: Temporal  MAP (mmHg): 82    Oxygen Therapy  SpO2: 96 %  O2 Device: None (Room air)            Physical Exam  Vitals and nursing note reviewed.   Constitutional:       Appearance: She is not toxic-appearing.   Cardiovascular:      Rate and Rhythm: Normal rate. Rhythm irregular.      Pulses: Normal pulses.   Pulmonary:      Breath sounds: Examination of the right-lower field reveals decreased breath sounds. Examination of the left-lower field reveals decreased breath sounds. Decreased breath sounds present.   Chest:      Chest wall: No tenderness.   Musculoskeletal:       Cervical back: Normal range of motion and neck supple.      Right lower leg: No tenderness. Edema present.      Left lower leg: No tenderness. Edema present.   Skin:     General: Skin is warm and dry.   Neurological:      General: No focal deficit present.      Mental Status: She is alert.   Psychiatric:         Mood and Affect: Mood normal.         Behavior: Behavior normal.                 ED Course  Labs Reviewed   COMP METABOLIC PANEL (14) - Abnormal; Notable for the following components:       Result Value    Glucose 102 (*)     Creatinine 1.10 (*)     eGFR-Cr 51 (*)     AST 41 (*)     All other components within normal limits   PRO BETA NATRIURETIC PEPTIDE - Abnormal; Notable for the following components:    Pro-Beta Natriuretic Peptide 3,081 (*)     All other components within normal limits   PROTHROMBIN TIME (PT) - Abnormal; Notable for the following components:    PT 15.2 (*)     All other components within normal limits   TROPONIN I HIGH SENSITIVITY - Normal   PTT, ACTIVATED - Normal   CBC WITH DIFFERENTIAL WITH PLATELET   RAINBOW DRAW LAVENDER   RAINBOW DRAW LIGHT GREEN   RAINBOW DRAW BLUE   RAINBOW DRAW GOLD     EKG    Rate, intervals and axes as noted on EKG Report.  Rate: 107  Rhythm: Sinus Rhythm  Reading: EKG appears to be sinus rhythm with left bundle branch block and PVCs present.  No obvious ST elevations.  Frequent PVCs.  Left bundle branch block also present in 2020 but much more frequent PVCs at this time today                                MDM     XR CHEST AP PORTABLE  (CPT=71045)  Result Date: 7/12/2025  CONCLUSION: No acute radiographic findings. Electronically Verified and Signed by Attending Radiologist: Kathleen Peterson MD 7/12/2025 4:16 PM Workstation: [email or web address]    I independently interpreted the x-ray of the chest no obvious pleural effusion seen     at bedside helpful to provide information on the history of presenting illness    Differential diagnosis includes, but not limited  to, CHF, PE, ACS, unstable angina    External chart review demonstrates an encounter with [facility] yesterday but cannot see the details of the encounter      80-year-old female exertional chest pain, shortness of breath, intermittent left arm pain, unintentional weight gain ongoing for months worse in the past couple of weeks.  Started on torsemide yesterday, states no better today.  Called [facility], sent here.  EKG appears to be sinus rhythm with frequent PVCs, left lateral branch block which is chronic for her.  No recent echo.  No recent stress.  No recent cath.  Given Lasix here.  Talk to [facility], they will see in consultation order echo and further workup.  Admitted to Dr. Sanches, awaiting bed assignment      Admission disposition: 7/12/2025  4:27 PM           Medical Decision Making      Disposition and Plan     Clinical Impression:  1. GAMEZ (dyspnea on exertion)    2. Exertional chest pain    3. Elevated brain natriuretic peptide (BNP) level    4. Elevated blood pressure reading         Disposition:  Admit  7/12/2025  4:27 pm    Follow-up:  No follow-up provider specified.        Medications Prescribed:  Current Discharge Medication List                Supplementary Documentation:         Hospital Problems       Present on Admission  Date Reviewed: 4/5/2025          ICD-10-CM Noted POA    * (Principal) GAMEZ (dyspnea on exertion) R06.09 7/12/2025 Unknown

## 2025-07-12 NOTE — PLAN OF CARE
Problem: SAFETY ADULT - FALL  Goal: Free from fall injury  Description: INTERVENTIONS:  - Assess pt frequently for physical needs  - Identify cognitive and physical deficits and behaviors that affect risk of falls.  - [place] fall precautions as indicated by assessment.  - Educate pt/family on patient safety including physical limitations  - Instruct pt to call for assistance with activity based on assessment  - Modify environment to reduce risk of injury  - Provide assistive devices as appropriate  - Consider OT/PT consult to assist with strengthening/mobility  - Encourage toileting schedule  Outcome: Progressing     Problem: CARDIOVASCULAR - ADULT  Goal: Maintains optimal cardiac output and hemodynamic stability  Description: INTERVENTIONS:  - Monitor vital signs, rhythm, and trends  - Monitor for bleeding, hypotension and signs of decreased cardiac output  - Evaluate effectiveness of vasoactive medications to optimize hemodynamic stability  - Monitor arterial and/or venous puncture sites for bleeding and/or hematoma  - Assess quality of pulses, skin color and temperature  - Assess for signs of decreased coronary artery perfusion - ex. Angina  - Evaluate fluid balance, assess for edema, trend weights  Outcome: Progressing  Goal: Absence of cardiac arrhythmias or at baseline  Description: INTERVENTIONS:  - Continuous cardiac monitoring, monitor vital signs, obtain 12 lead EKG if indicated  - Evaluate effectiveness of antiarrhythmic and heart rate control medications as ordered  - Initiate emergency measures for life threatening arrhythmias  - Monitor electrolytes and administer replacement therapy as ordered  Outcome: Progressing     Problem: RESPIRATORY - ADULT  Goal: Achieves optimal ventilation and oxygenation  Description: INTERVENTIONS:  - Assess for changes in respiratory status  - Assess for changes in mentation and behavior  - Position to facilitate oxygenation and minimize respiratory effort  - Oxygen  supplementation based on oxygen saturation or ABGs  - Provide Smoking Cessation handout, if applicable  - Encourage broncho-pulmonary hygiene including cough, deep breathe, Incentive Spirometry  - Assess the need for suctioning and perform as needed  - Assess and instruct to report SOB or any respiratory difficulty  - Respiratory Therapy support as indicated  - Manage/alleviate anxiety  - Monitor for signs/symptoms of CO2 retention  Outcome: Progressing     Problem: METABOLIC/FLUID AND ELECTROLYTES - ADULT  Goal: Electrolytes maintained within normal limits  Description: INTERVENTIONS:  - Monitor labs and rhythm and assess patient for signs and symptoms of electrolyte imbalances  - Administer electrolyte replacement as ordered  - Monitor response to electrolyte replacements, including rhythm and repeat lab results as appropriate  - Fluid restriction as ordered  - Instruct patient on fluid and nutrition restrictions as appropriate  Outcome: Progressing     Problem: SKIN/TISSUE INTEGRITY - ADULT  Goal: Skin integrity remains intact  Description: INTERVENTIONS  - Assess and document risk factors for pressure ulcer development  - Assess and document skin integrity  - Monitor for areas of redness and/or skin breakdown  - Initiate interventions, skin care algorithm/standards of care as needed  Outcome: Progressing     Problem: Patient/Family Goals  Goal: Patient/Family Long Term Goal  Description: Patient's Long Term Goal:  to go home     Interventions:  - Telemetry monitoring     Cardiololgy consult  - See additional Care Plan goals for specific interventions  Outcome: Progressing  Goal: Patient/Family Short Term Goal  Description: Patient's Short Term Goal:  to feel better    Interventions:   -    Medication adjustment  - See additional Care Plan goals for specific interventions  Outcome: Progressing

## 2025-07-12 NOTE — H&P
[facility]     Linda Hernandez Patient Status:  Emergency    1945 MRN [id number]   Location [facility] EMERGENCY DEPARTMENT Attending Gil Solis, DO   Hosp Day # 0 PCP Kofi Blandon MD     Chief Complaint: Shortness of breath     Subjective:    History of Present Illness:     Linda Hernandez is a 80 year old female with past medical history of atrial fibrillation on Eliquis presents with shortness of breath.    Patient's endorsing a progressive worsening of shortness of breath.  She is having orthopnea and is having to sit up, she feels her lower extremities are swollen and she is gaining weight, she denies any chest discomfort.  No pain with deep breathing.  No fevers or chills.  No cough.    History/Other:    Past Medical History:  Past Medical History:    Allergic rhinitis due to allergen    Breast cancer (HCC)    Invasive Ductal Carcinoma-Grade 1    Calculus of kidney    Cervix dysplasia    LUZ 3 with laser cone 2010    Essential hypertension    Exposure to medical diagnostic radiation    Hyperlipidemia    Left bundle branch block    NAFLD (nonalcoholic fatty liver disease)    Diffuse fatty infiltration of the liver on ultrasound 2011    Pneumonia due to organism    LEFT LUNG    Primary osteoarthritis of both hips    Primary osteoarthritis of left knee    Primary osteoarthritis of right knee    Pulmonary embolism (HCC)    Occurred after a long plane ride ON HRT    Visual impairment    READERS     Past Surgical History:   Past Surgical History:   Procedure Laterality Date    Cataract  10/14/08    Right eye    Cataract  10/28/08    Left eye    Colonoscopy      Fracture surgery  93    Fractured L ankle. 1 plate and 8 scres    Lumpectomy left  2/3/16    L breast lumpectomy w/sentinel lymph node biopsy and lymph node dissection    Other surgical history  08    Oral surgery-2 cyst and 2 wisdom teeth    Other surgical  history  3/13/09    Kidney stones removed    Other surgical history  1/20/10    Colposcopy    Other surgical history  12    R eye laser surgery-YAG Capsulotomy    Other surgical history  2/6/15    L eye laser surgery-YAG Capsulotomy    Other surgical history  16    L breast biopsy    Other surgical history  2020    ablation     Radiation left        Family History:   Family History   Adopted: Yes   Problem Relation Age of Onset    Breast Cancer Self 68    Stroke Mother     Heart Attack Father 38    BRCA gene + Sister     Breast Cancer Sister 50        estimate    Breast Cancer Maternal Grandmother      Social History:    reports that she has never smoked. She has never used smokeless tobacco. She reports current alcohol use. She reports that she does not use drugs.     Allergies:   Allergies   Allergen Reactions    Sotalol SHORTNESS OF BREATH    Sulfa Antibiotics HIVES, SWELLING, ANGIOEDEMA and OTHER (SEE COMMENTS)     CLASS    Doxycycline ITCHING     Per pt: \"made me feel sick-quit taking\"    Keflex [Cephalexin] OTHER (SEE COMMENTS)     Cough and sweats    Lisinopril Coughing     cough    Valsartan Coughing       Medications:    No current facility-administered medications on file prior to encounter.     Current Outpatient Medications on File Prior to Encounter   Medication Sig Dispense Refill    torsemide 20 MG Oral Tab torsemide 20 mg tablet, [RxNorm: 136508]      SIMVASTATIN 20 MG Oral Tab TAKE 1 TABLET EVERY NIGHT 90 tablet 3    Metoprolol Succinate  MG Oral Tablet 24 Hr Take 0.5 tablets (50 mg total) by mouth in the morning.      apixaban 5 MG Oral Tab Take 1 tablet (5 mg total) by mouth in the morning and 1 tablet (5 mg total) before bedtime.  0    [] azithromycin 250 MG Oral Tab Take 2 tablets (500 mg total) by mouth daily for 1 day, THEN 1 tablet (250 mg total) daily for 4 days. 6 tablet 0    NON FORMULARY Balance of nature fruits and veggies      NON FORMULARY daily. Vit d/K2 -  david 5 drops      NON FORMULARY Natural calm- magnesium supplement 1-2 tsp HS prn         Review of Systems:   A comprehensive review of systems was completed.    Pertinent positives and negatives noted in the HPI.    Objective:   Physical Exam:    /67   Pulse 79   Temp 98.7 °F (37.1 °C) (Temporal)   Resp 16   Ht 5' 4\" (1.626 m)   Wt 264 lb (119.7 kg)   LMP  (LMP Unknown)   SpO2 96%   BMI 45.32 kg/m²   General: No acute distress, Alert  Respiratory: diminished at bases   Cardiovascular: S1, S2. IRIR  Abdomen: Soft, Non-tender, non-distended, positive bowel sounds  Neuro: No new focal deficits  Extremities: 1+ LE pitting edema     Results:    Labs:      Labs Last 24 Hours:    Recent Labs   Lab 07/12/25  1533   RBC 4.82   HGB 14.8   HCT 43.4   MCV 90.0   MCH 30.7   MCHC 34.1   RDW 15.1   NEPRELIM 4.34   WBC 7.6   .0       Recent Labs   Lab 07/12/25  1533   *   BUN 20   CREATSERUM 1.10*   EGFRCR 51*   CA 10.3   ALB 4.5      K 3.6      CO2 26.0   ALKPHO 96   AST 41*   ALT 49   BILT 1.0   TP 7.7       Estimated Glomerular Filtration Rate: 51 mL/min/1.73m2 (A) (result from lab).    Lab Results   Component Value Date    INR 1.18 07/12/2025       Recent Labs   Lab 07/12/25  1533   TROPHS 9       Recent Labs   Lab 07/12/25  1533   PBNP 3,081*       No results for input(s): \"PCT\" in the last 168 hours.    Imaging: Imaging data reviewed in Epic.    Assessment & Plan:      #GAMEZ, Weight gain, presentation consistent with CHF, no previous history  -recently started on Torsemide outpatient, IV diuretics per cardiology on consult  -ECHO    #paroxysmal Atrial fibrillation  -Metoprolol  -Eliquis    #HLD  -Statin    #Morbid Obesity  Body mass index is 45.32 kg/m².              Plan of care discussed with patient, ED MD Pauly Sanches, DO    Supplementary Documentation:     The 21st Century Cures Act makes medical notes like these available to patients in the interest of transparency. Please  be advised this is a medical document. Medical documents are intended to carry relevant information, facts as evident, and the clinical opinion of the practitioner. The medical note is intended as peer to peer communication and may appear blunt or direct. It is written in medical language and may contain abbreviations or verbiage that are unfamiliar.

## 2025-07-12 NOTE — ED QUICK NOTES
Orders for admission, patient is aware of plan and ready to go upstairs. Any questions, please call ED RN Linda at extension [phone].     Patient Covid vaccination status: Unvaccinated     COVID Test Ordered in ED: None    COVID Suspicion at Admission: N/A    Running Infusions: Medication Infusions[1] None    Mental Status/LOC at time of transport: A+OX4    Other pertinent information:   CIWA score: N/A   NIH score:  N/A             [1]

## 2025-07-13 ENCOUNTER — APPOINTMENT (OUTPATIENT)
Dept: CV DIAGNOSTICS | Facility: HOSPITAL | Age: 80
End: 2025-07-13
Attending: INTERNAL MEDICINE
Payer: MEDICARE

## 2025-07-13 ENCOUNTER — APPOINTMENT (OUTPATIENT)
Dept: CV DIAGNOSTICS | Facility: HOSPITAL | Age: 80
DRG: 286 | End: 2025-07-13
Attending: INTERNAL MEDICINE
Payer: MEDICARE

## 2025-07-13 LAB
ANION GAP SERPL CALC-SCNC: 8 MMOL/L (ref 0–18)
BASOPHILS # BLD AUTO: 0.06 X10(3) UL (ref 0–0.2)
BASOPHILS NFR BLD AUTO: 1 %
BUN BLD-MCNC: 25 MG/DL (ref 9–23)
CALCIUM BLD-MCNC: 9.6 MG/DL (ref 8.7–10.6)
CHLORIDE SERPL-SCNC: 105 MMOL/L (ref 98–112)
CO2 SERPL-SCNC: 30 MMOL/L (ref 21–32)
CREAT BLD-MCNC: 0.96 MG/DL (ref 0.55–1.02)
EGFRCR SERPLBLD CKD-EPI 2021: 61 ML/MIN/1.73M2 (ref 60–?)
EOSINOPHIL # BLD AUTO: 0.13 X10(3) UL (ref 0–0.7)
EOSINOPHIL NFR BLD AUTO: 2.2 %
ERYTHROCYTE [DISTWIDTH] IN BLOOD BY AUTOMATED COUNT: 15.4 %
GLUCOSE BLD-MCNC: 122 MG/DL (ref 70–99)
HCT VFR BLD AUTO: 42.4 % (ref 35–48)
HGB BLD-MCNC: 14.7 G/DL (ref 12–16)
IMM GRANULOCYTES # BLD AUTO: 0.01 X10(3) UL (ref 0–1)
IMM GRANULOCYTES NFR BLD: 0.2 %
LYMPHOCYTES # BLD AUTO: 1.88 X10(3) UL (ref 1–4)
LYMPHOCYTES NFR BLD AUTO: 32.1 %
MAGNESIUM SERPL-MCNC: 2 MG/DL (ref 1.6–2.6)
MCH RBC QN AUTO: 30.8 PG (ref 26–34)
MCHC RBC AUTO-ENTMCNC: 34.7 G/DL (ref 31–37)
MCV RBC AUTO: 88.7 FL (ref 80–100)
MONOCYTES # BLD AUTO: 0.69 X10(3) UL (ref 0.1–1)
MONOCYTES NFR BLD AUTO: 11.8 %
NEUTROPHILS # BLD AUTO: 3.09 X10 (3) UL (ref 1.5–7.7)
NEUTROPHILS # BLD AUTO: 3.09 X10(3) UL (ref 1.5–7.7)
NEUTROPHILS NFR BLD AUTO: 52.7 %
OSMOLALITY SERPL CALC.SUM OF ELEC: 302 MOSM/KG (ref 275–295)
PLATELET # BLD AUTO: 258 10(3)UL (ref 150–450)
POTASSIUM SERPL-SCNC: 4.2 MMOL/L (ref 3.5–5.1)
POTASSIUM SERPL-SCNC: 4.2 MMOL/L (ref 3.5–5.1)
RBC # BLD AUTO: 4.78 X10(6)UL (ref 3.8–5.3)
SODIUM SERPL-SCNC: 143 MMOL/L (ref 136–145)
WBC # BLD AUTO: 5.9 X10(3) UL (ref 4–11)

## 2025-07-13 PROCEDURE — 93306 TTE W/DOPPLER COMPLETE: CPT | Performed by: INTERNAL MEDICINE

## 2025-07-13 PROCEDURE — 99232 SBSQ HOSP IP/OBS MODERATE 35: CPT | Performed by: INTERNAL MEDICINE

## 2025-07-13 RX ORDER — SODIUM CHLORIDE 9 MG/ML
INJECTION, SOLUTION INTRAVENOUS CONTINUOUS
Status: DISCONTINUED | OUTPATIENT
Start: 2025-07-13 | End: 2025-07-13

## 2025-07-13 RX ORDER — SODIUM CHLORIDE 9 MG/ML
INJECTION, SOLUTION INTRAVENOUS CONTINUOUS
Status: DISCONTINUED | OUTPATIENT
Start: 2025-07-14 | End: 2025-07-14

## 2025-07-13 RX ORDER — FUROSEMIDE 10 MG/ML
40 INJECTION INTRAMUSCULAR; INTRAVENOUS
Status: DISCONTINUED | OUTPATIENT
Start: 2025-07-13 | End: 2025-07-15

## 2025-07-13 NOTE — PROGRESS NOTES
[facility]   part of [facility]     Hospitalist Progress Note     Linda Hernandez Patient Status:  Inpatient    1945 MRN [id number]   Location [facility] 2NE-A Attending Pauly Sanches,    Hosp Day # 1 PCP Kofi Blandon MD     Chief Complaint: Dyspnea    Subjective:     Patient seen and examined, feeling better and urinating a lot  No new complaints     Objective:    Review of Systems:   A comprehensive review of systems was completed; pertinent positive and negatives stated in subjective.    Vital signs:  Temp:  [97.7 °F (36.5 °C)-98.7 °F (37.1 °C)] 98.2 °F (36.8 °C)  Pulse:  [] 96  Resp:  [16-18] 18  BP: (119-168)/() 144/114  SpO2:  [90 %-98 %] 91 %    Physical Exam:    General: No acute distress  Respiratory: No wheezes, diminished   Cardiovascular: S1, S2, IRIR  Abdomen: Soft, Non-tender, non-distended, positive bowel sounds  Neuro: No new focal deficits.   Extremities: 1+ LE edema       Diagnostic Data:    Labs:  Recent Labs   Lab 25  1533 25  0530   WBC 7.6 5.9   HGB 14.8 14.7   MCV 90.0 88.7   .0 258.0   INR 1.18  --        Recent Labs   Lab 25  1533 25  0530   * 122*   BUN 20 25*   CREATSERUM 1.10* 0.96   CA 10.3 9.6   ALB 4.5  --     143   K 3.6 4.2  4.2    105   CO2 26.0 30.0   ALKPHO 96  --    AST 41*  --    ALT 49  --    BILT 1.0  --    TP 7.7  --        Estimated Glomerular Filtration Rate: 61 mL/min/1.73m2 (result from lab).    Recent Labs   Lab 25  1533   TROPHS 9       Recent Labs   Lab 25  1533   PTP 15.2*   INR 1.18                  Microbiology    No results found for this visit on 25.      Imaging: Reviewed in Epic.    Medications:    furosemide  40 mg Intravenous BID (Diuretic)    apixaban  5 mg Oral BID    atorvastatin  10 mg Oral Nightly    metoprolol succinate ER  50 mg Oral Nightly       Assessment & Plan:      #GAMEZ, Weight gain, presentation consistent with CHF, no previous  history  -recently started on Torsemide outpatient, IV diuretics per cardiology on consult  -ECHO pending     #Atrial fibrillation  -Metoprolol  -Eliquis     #HLD  -Statin     #Morbid Obesity  -Body mass index is 45.32 kg/m²      Pauly Sanches DO    Supplementary Documentation:     Quality:  DVT Mechanical Prophylaxis:     Early ambuation  DVT Pharmacologic Prophylaxis   Medication    apixaban (Eliquis) tab 5 mg                Code Status: Full Code  Monsivais: No urinary catheter in place  Monsivais Duration (in days):   Central line:    NESTOR: 7/14/2025        The 21st Century Cures Act makes medical notes like these available to patients in the interest of transparency. Please be advised this is a medical document. Medical documents are intended to carry relevant information, facts as evident, and the clinical opinion of the practitioner. The medical note is intended as peer to peer communication and may appear blunt or direct. It is written in medical language and may contain abbreviations or verbiage that are unfamiliar.              **Certification      PHYSICIAN Certification of Need for Inpatient Hospitalization - Initial Certification    Patient will require inpatient services that will reasonably be expected to span two midnight's based on the clinical documentation in H+P.   Based on patients current state of illness, I anticipate that, after discharge, patient will require TBD.

## 2025-07-13 NOTE — PLAN OF CARE
Assumed patient care at 0730.  Alert  and oriented x 4. Family at bedside.  Vital signs are stable.   Currently Afib on tele. HR 90's  Denies palpitations, or chest pain at this time, reports some intermittent arm pain,  Dyspnea with exertion, otherwise at rest feel ok, states she unable to lay flat, remains on room air.  Cardiology here to see patient, discussed plan of care with patient.  New order for Lasix IV noted.  2 D echo with doppler noted. Pending completion. Denies abdominal  pain or other discomfort at thi time  Fall precautions in progress, daily wt. Intake and output monitoring  Problem: SAFETY ADULT - FALL  Goal: Free from fall injury  Description: INTERVENTIONS:  - Assess pt frequently for physical needs  - Identify cognitive and physical deficits and behaviors that affect risk of falls.  - [place] fall precautions as indicated by assessment.  - Educate pt/family on patient safety including physical limitations  - Instruct pt to call for assistance with activity based on assessment  - Modify environment to reduce risk of injury  - Provide assistive devices as appropriate  - Consider OT/PT consult to assist with strengthening/mobility  - Encourage toileting schedule  7/13/2025 0749 by Anayeli Jenkins RN  Outcome: Progressing  7/12/2025 1836 by Anayeli Jenkins RN  Outcome: Progressing  7/12/2025 1836 by Anayeli Jenkins RN  Outcome: Progressing     Problem: CARDIOVASCULAR - ADULT  Goal: Maintains optimal cardiac output and hemodynamic stability  Description: INTERVENTIONS:  - Monitor vital signs, rhythm, and trends  - Monitor for bleeding, hypotension and signs of decreased cardiac output  - Evaluate effectiveness of vasoactive medications to optimize hemodynamic stability  - Monitor arterial and/or venous puncture sites for bleeding and/or hematoma  - Assess quality of pulses, skin color and temperature  - Assess for signs of decreased coronary artery perfusion - ex. Angina  - Evaluate  fluid balance, assess for edema, trend weights  7/13/2025 0749 by Anayeli Jenkins RN  Outcome: Progressing  7/12/2025 1836 by Anayeli Jenkins RN  Outcome: Progressing  7/12/2025 1836 by Anayeli Jenkins RN  Outcome: Progressing  Goal: Absence of cardiac arrhythmias or at baseline  Description: INTERVENTIONS:  - Continuous cardiac monitoring, monitor vital signs, obtain 12 lead EKG if indicated  - Evaluate effectiveness of antiarrhythmic and heart rate control medications as ordered  - Initiate emergency measures for life threatening arrhythmias  - Monitor electrolytes and administer replacement therapy as ordered  7/13/2025 0749 by Anayeli Jenkins RN  Outcome: Progressing  7/12/2025 1836 by Anayeli Jenkins RN  Outcome: Progressing  7/12/2025 1836 by Anayeli Jenkins RN  Outcome: Progressing     Problem: RESPIRATORY - ADULT  Goal: Achieves optimal ventilation and oxygenation  Description: INTERVENTIONS:  - Assess for changes in respiratory status  - Assess for changes in mentation and behavior  - Position to facilitate oxygenation and minimize respiratory effort  - Oxygen supplementation based on oxygen saturation or ABGs  - Provide Smoking Cessation handout, if applicable  - Encourage broncho-pulmonary hygiene including cough, deep breathe, Incentive Spirometry  - Assess the need for suctioning and perform as needed  - Assess and instruct to report SOB or any respiratory difficulty  - Respiratory Therapy support as indicated  - Manage/alleviate anxiety  - Monitor for signs/symptoms of CO2 retention  7/13/2025 0749 by Anayeli Jenkins RN  Outcome: Progressing  7/12/2025 1836 by Anayeli Jenkins RN  Outcome: Progressing  7/12/2025 1836 by Anayeli Jenkins RN  Outcome: Progressing     Problem: METABOLIC/FLUID AND ELECTROLYTES - ADULT  Goal: Electrolytes maintained within normal limits  Description: INTERVENTIONS:  - Monitor labs and rhythm and assess patient for signs and symptoms of  electrolyte imbalances  - Administer electrolyte replacement as ordered  - Monitor response to electrolyte replacements, including rhythm and repeat lab results as appropriate  - Fluid restriction as ordered  - Instruct patient on fluid and nutrition restrictions as appropriate  7/13/2025 0749 by Anayeli Jenkins RN  Outcome: Progressing  7/12/2025 1836 by Anayeli Jenkins RN  Outcome: Progressing  7/12/2025 1836 by Anayeli Jenkins, RN  Outcome: Progressing

## 2025-07-14 LAB
ANION GAP SERPL CALC-SCNC: 13 MMOL/L (ref 0–18)
APTT PPP: 27.3 SECONDS (ref 23–36)
APTT PPP: 40.4 SECONDS (ref 23–36)
ATRIAL RATE: 107 BPM
ATRIAL RATE: 92 BPM
BUN BLD-MCNC: 29 MG/DL (ref 9–23)
CALCIUM BLD-MCNC: 10 MG/DL (ref 8.7–10.6)
CHLORIDE SERPL-SCNC: 103 MMOL/L (ref 98–112)
CO2 SERPL-SCNC: 26 MMOL/L (ref 21–32)
CREAT BLD-MCNC: 0.91 MG/DL (ref 0.55–1.02)
DEPRECATED HBV CORE AB SER IA-ACNC: 85 NG/ML (ref 50–306)
EGFRCR SERPLBLD CKD-EPI 2021: 65 ML/MIN/1.73M2 (ref 60–?)
ERYTHROCYTE [DISTWIDTH] IN BLOOD BY AUTOMATED COUNT: 15.2 %
GLUCOSE BLD-MCNC: 116 MG/DL (ref 70–99)
HCT VFR BLD AUTO: 46 % (ref 35–48)
HGB BLD-MCNC: 15.8 G/DL (ref 12–16)
IRON SATN MFR SERPL: 10 % (ref 15–50)
IRON SERPL-MCNC: 41 UG/DL (ref 50–170)
MCH RBC QN AUTO: 31 PG (ref 26–34)
MCHC RBC AUTO-ENTMCNC: 34.3 G/DL (ref 31–37)
MCV RBC AUTO: 90.4 FL (ref 80–100)
OSMOLALITY SERPL CALC.SUM OF ELEC: 301 MOSM/KG (ref 275–295)
PLATELET # BLD AUTO: 283 10(3)UL (ref 150–450)
POTASSIUM SERPL-SCNC: 3.8 MMOL/L (ref 3.5–5.1)
Q-T INTERVAL: 370 MS
Q-T INTERVAL: 370 MS
QRS DURATION: 142 MS
QRS DURATION: 168 MS
QTC CALCULATION (BEZET): 457 MS
QTC CALCULATION (BEZET): 493 MS
R AXIS: -43 DEGREES
R AXIS: 92 DEGREES
RBC # BLD AUTO: 5.09 X10(6)UL (ref 3.8–5.3)
SODIUM SERPL-SCNC: 142 MMOL/L (ref 136–145)
T AXIS: -87 DEGREES
T AXIS: 126 DEGREES
TOTAL IRON BINDING CAPACITY: 394 UG/DL (ref 250–425)
TRANSFERRIN SERPL-MCNC: 322 MG/DL (ref 250–380)
VENTRICULAR RATE: 107 BPM
VENTRICULAR RATE: 92 BPM
WBC # BLD AUTO: 8.5 X10(3) UL (ref 4–11)

## 2025-07-14 PROCEDURE — 99232 SBSQ HOSP IP/OBS MODERATE 35: CPT | Performed by: INTERNAL MEDICINE

## 2025-07-14 RX ORDER — SODIUM CHLORIDE 9 MG/ML
INJECTION, SOLUTION INTRAVENOUS
Status: DISCONTINUED | OUTPATIENT
Start: 2025-07-15 | End: 2025-07-15

## 2025-07-14 RX ORDER — SPIRONOLACTONE 25 MG/1
25 TABLET ORAL DAILY
Status: DISCONTINUED | OUTPATIENT
Start: 2025-07-14 | End: 2025-07-16

## 2025-07-14 RX ORDER — HEPARIN SODIUM AND DEXTROSE 10000; 5 [USP'U]/100ML; G/100ML
INJECTION INTRAVENOUS CONTINUOUS
Status: DISCONTINUED | OUTPATIENT
Start: 2025-07-14 | End: 2025-07-15

## 2025-07-14 RX ORDER — HEPARIN SODIUM AND DEXTROSE 10000; 5 [USP'U]/100ML; G/100ML
1000 INJECTION INTRAVENOUS ONCE
Status: COMPLETED | OUTPATIENT
Start: 2025-07-14 | End: 2025-07-14

## 2025-07-14 RX ORDER — POTASSIUM CHLORIDE 1500 MG/1
40 TABLET, EXTENDED RELEASE ORAL ONCE
Status: COMPLETED | OUTPATIENT
Start: 2025-07-14 | End: 2025-07-14

## 2025-07-14 RX ORDER — SODIUM CHLORIDE 9 MG/ML
INJECTION, SOLUTION INTRAVENOUS CONTINUOUS
Status: DISCONTINUED | OUTPATIENT
Start: 2025-07-15 | End: 2025-07-15

## 2025-07-14 NOTE — PLAN OF CARE
Pt AOx4. O2 sats maintained on RA, reports improvement of SOB. Afib on tele with freq. PVCs, rates 80s-90s. Eliquis on hold for upcoming procedure. IV lasix BID. Plan for L/R HC tomorrow and possible LAEXIS/DCCV, consents obtained. K replaced per electrolyte protocol. Pt and spouse updated on plan of care.     Problem: SAFETY ADULT - FALL  Goal: Free from fall injury  Description: INTERVENTIONS:  - Assess pt frequently for physical needs  - Identify cognitive and physical deficits and behaviors that affect risk of falls.  - [place] fall precautions as indicated by assessment.  - Educate pt/family on patient safety including physical limitations  - Instruct pt to call for assistance with activity based on assessment  - Modify environment to reduce risk of injury  - Provide assistive devices as appropriate  - Consider OT/PT consult to assist with strengthening/mobility  - Encourage toileting schedule  Outcome: Progressing     Problem: CARDIOVASCULAR - ADULT  Goal: Maintains optimal cardiac output and hemodynamic stability  Description: INTERVENTIONS:  - Monitor vital signs, rhythm, and trends  - Monitor for bleeding, hypotension and signs of decreased cardiac output  - Evaluate effectiveness of vasoactive medications to optimize hemodynamic stability  - Monitor arterial and/or venous puncture sites for bleeding and/or hematoma  - Assess quality of pulses, skin color and temperature  - Assess for signs of decreased coronary artery perfusion - ex. Angina  - Evaluate fluid balance, assess for edema, trend weights  Outcome: Progressing  Goal: Absence of cardiac arrhythmias or at baseline  Description: INTERVENTIONS:  - Continuous cardiac monitoring, monitor vital signs, obtain 12 lead EKG if indicated  - Evaluate effectiveness of antiarrhythmic and heart rate control medications as ordered  - Initiate emergency measures for life threatening arrhythmias  - Monitor electrolytes and administer replacement therapy as  ordered  Outcome: Progressing     Problem: RESPIRATORY - ADULT  Goal: Achieves optimal ventilation and oxygenation  Description: INTERVENTIONS:  - Assess for changes in respiratory status  - Assess for changes in mentation and behavior  - Position to facilitate oxygenation and minimize respiratory effort  - Oxygen supplementation based on oxygen saturation or ABGs  - Provide Smoking Cessation handout, if applicable  - Encourage broncho-pulmonary hygiene including cough, deep breathe, Incentive Spirometry  - Assess the need for suctioning and perform as needed  - Assess and instruct to report SOB or any respiratory difficulty  - Respiratory Therapy support as indicated  - Manage/alleviate anxiety  - Monitor for signs/symptoms of CO2 retention  Outcome: Progressing     Problem: METABOLIC/FLUID AND ELECTROLYTES - ADULT  Goal: Electrolytes maintained within normal limits  Description: INTERVENTIONS:  - Monitor labs and rhythm and assess patient for signs and symptoms of electrolyte imbalances  - Administer electrolyte replacement as ordered  - Monitor response to electrolyte replacements, including rhythm and repeat lab results as appropriate  - Fluid restriction as ordered  - Instruct patient on fluid and nutrition restrictions as appropriate  Outcome: Progressing     Problem: SKIN/TISSUE INTEGRITY - ADULT  Goal: Skin integrity remains intact  Description: INTERVENTIONS  - Assess and document risk factors for pressure ulcer development  - Assess and document skin integrity  - Monitor for areas of redness and/or skin breakdown  - Initiate interventions, skin care algorithm/standards of care as needed  Outcome: Progressing     Problem: Patient/Family Goals  Goal: Patient/Family Long Term Goal  Description: Patient's Long Term Goal:  to go home     Interventions:  - Telemetry monitoring     Cardiololgy consult  - See additional Care Plan goals for specific interventions  Outcome: Progressing  Goal: Patient/Family Short  Term Goal  Description: Patient's Short Term Goal:  to feel better    Interventions:   -    Medication adjustment  - See additional Care Plan goals for specific interventions  Outcome: Progressing

## 2025-07-14 NOTE — PLAN OF CARE
Received patient at 1930. Patient is A&O x 4. Continent bladder and bowel. A-fib on tele with PVCs. Up with standby, saturating on room air. Denies chest pain. Respirations remain unlabored. Fall precaution maintained, bed in low position and call light within reach. Reviewed plan of care and patient verbalizes understanding.     Plan  NPO- cardioversion  Possible gated CTA     Problem: SAFETY ADULT - FALL  Goal: Free from fall injury  Description: INTERVENTIONS:  - Assess pt frequently for physical needs  - Identify cognitive and physical deficits and behaviors that affect risk of falls.  - [place] fall precautions as indicated by assessment.  - Educate pt/family on patient safety including physical limitations  - Instruct pt to call for assistance with activity based on assessment  - Modify environment to reduce risk of injury  - Provide assistive devices as appropriate  - Consider OT/PT consult to assist with strengthening/mobility  - Encourage toileting schedule  Outcome: Progressing     Problem: CARDIOVASCULAR - ADULT  Goal: Maintains optimal cardiac output and hemodynamic stability  Description: INTERVENTIONS:  - Monitor vital signs, rhythm, and trends  - Monitor for bleeding, hypotension and signs of decreased cardiac output  - Evaluate effectiveness of vasoactive medications to optimize hemodynamic stability  - Monitor arterial and/or venous puncture sites for bleeding and/or hematoma  - Assess quality of pulses, skin color and temperature  - Assess for signs of decreased coronary artery perfusion - ex. Angina  - Evaluate fluid balance, assess for edema, trend weights  Outcome: Progressing  Goal: Absence of cardiac arrhythmias or at baseline  Description: INTERVENTIONS:  - Continuous cardiac monitoring, monitor vital signs, obtain 12 lead EKG if indicated  - Evaluate effectiveness of antiarrhythmic and heart rate control medications as ordered  - Initiate emergency measures for life threatening  arrhythmias  - Monitor electrolytes and administer replacement therapy as ordered  Outcome: Progressing     Problem: RESPIRATORY - ADULT  Goal: Achieves optimal ventilation and oxygenation  Description: INTERVENTIONS:  - Assess for changes in respiratory status  - Assess for changes in mentation and behavior  - Position to facilitate oxygenation and minimize respiratory effort  - Oxygen supplementation based on oxygen saturation or ABGs  - Provide Smoking Cessation handout, if applicable  - Encourage broncho-pulmonary hygiene including cough, deep breathe, Incentive Spirometry  - Assess the need for suctioning and perform as needed  - Assess and instruct to report SOB or any respiratory difficulty  - Respiratory Therapy support as indicated  - Manage/alleviate anxiety  - Monitor for signs/symptoms of CO2 retention  Outcome: Progressing     Problem: METABOLIC/FLUID AND ELECTROLYTES - ADULT  Goal: Electrolytes maintained within normal limits  Description: INTERVENTIONS:  - Monitor labs and rhythm and assess patient for signs and symptoms of electrolyte imbalances  - Administer electrolyte replacement as ordered  - Monitor response to electrolyte replacements, including rhythm and repeat lab results as appropriate  - Fluid restriction as ordered  - Instruct patient on fluid and nutrition restrictions as appropriate  Outcome: Progressing     Problem: SKIN/TISSUE INTEGRITY - ADULT  Goal: Skin integrity remains intact  Description: INTERVENTIONS  - Assess and document risk factors for pressure ulcer development  - Assess and document skin integrity  - Monitor for areas of redness and/or skin breakdown  - Initiate interventions, skin care algorithm/standards of care as needed  Outcome: Progressing     Problem: Patient/Family Goals  Goal: Patient/Family Long Term Goal  Description: Patient's Long Term Goal:  to go home     Interventions:  - Telemetry monitoring     Cardiololgy consult  - See additional Care Plan goals for  specific interventions  Outcome: Progressing  Goal: Patient/Family Short Term Goal  Description: Patient's Short Term Goal:  to feel better    Interventions:   -    Medication adjustment  - See additional Care Plan goals for specific interventions  Outcome: Progressing

## 2025-07-14 NOTE — PROGRESS NOTES
[facility]   part of [facility]     Hospitalist Progress Note     Linda Hernandez Patient Status:  Inpatient    1945 MRN [id number]   Location [facility] 2NE-A Attending Pauly Sanches,    Hosp Day # 2 PCP Kofi Blandon MD     Chief Complaint: Dyspnea    Subjective:     Breathing continues to improve     Objective:    Review of Systems:   A comprehensive review of systems was completed; pertinent positive and negatives stated in subjective.    Vital signs:  Temp:  [97.6 °F (36.4 °C)-98.2 °F (36.8 °C)] 97.7 °F (36.5 °C)  Pulse:  [] 91  Resp:  [15-20] 16  BP: (131-160)/() 149/90  SpO2:  [91 %-94 %] 93 %    Physical Exam:    General: No acute distress  Respiratory: No wheezes, diminished   Cardiovascular: S1, S2, IRIR  Abdomen: Soft, Non-tender, non-distended, positive bowel sounds  Neuro: No new focal deficits.   Extremities: no LE edema       Diagnostic Data:    Labs:  Recent Labs   Lab 25  1533 25  0530   WBC 7.6 5.9   HGB 14.8 14.7   MCV 90.0 88.7   .0 258.0   INR 1.18  --        Recent Labs   Lab 25  1533 25  0530   * 122*   BUN 20 25*   CREATSERUM 1.10* 0.96   CA 10.3 9.6   ALB 4.5  --     143   K 3.6 4.2  4.2    105   CO2 26.0 30.0   ALKPHO 96  --    AST 41*  --    ALT 49  --    BILT 1.0  --    TP 7.7  --        Estimated Glomerular Filtration Rate: 61 mL/min/1.73m2 (result from lab).    Recent Labs   Lab 25  1533   TROPHS 9       Recent Labs   Lab 25  1533   PTP 15.2*   INR 1.18                  Microbiology    No results found for this visit on 25.      Imaging: Reviewed in Epic.    Medications:    furosemide  40 mg Intravenous BID (Diuretic)    [Held by provider] apixaban  5 mg Oral BID    atorvastatin  10 mg Oral Nightly    metoprolol succinate ER  50 mg Oral Nightly       Assessment & Plan:      #Acute HFrEF  -ECHO reviewed, EF 30-35% with Hypokinesis of the inferolateral, inferior and  inferoseptal walls   -IV diuretics and GDMT per cardiology on consult  -LHC planned     #Atrial fibrillation  -Metoprolol  -Eliquis  -DCCV planned     #HLD  -Statin     #Morbid Obesity  -Body mass index is 45.32 kg/m²      Pauly Sanches DO    Supplementary Documentation:     Quality:  DVT Mechanical Prophylaxis:     Early ambuation  DVT Pharmacologic Prophylaxis   Medication    [Held by provider] apixaban (Eliquis) tab 5 mg                Code Status: Full Code  Monsivais: No urinary catheter in place  Monsivais Duration (in days):   Central line:    NESTOR:         The 21st Century Cures Act makes medical notes like these available to patients in the interest of transparency. Please be advised this is a medical document. Medical documents are intended to carry relevant information, facts as evident, and the clinical opinion of the practitioner. The medical note is intended as peer to peer communication and may appear blunt or direct. It is written in medical language and may contain abbreviations or verbiage that are unfamiliar.              **Certification      PHYSICIAN Certification of Need for Inpatient Hospitalization - Initial Certification    Patient will require inpatient services that will reasonably be expected to span two midnight's based on the clinical documentation in H+P.   Based on patients current state of illness, I anticipate that, after discharge, patient will require TBD.

## 2025-07-14 NOTE — PROGRESS NOTES
Heart Failure Nurse  Progress Note    Patient was evaluated by the Heart Failure Nurse  for understanding, verbalization, demonstration, and recall of education related to heart failure, overall adherence to the behaviors necessary to maintain a compensated status, and risk for readmission.     Patient assessment: Patient alert and oriented, lives with her  Attila. Patient and Attila were receptive to the education, engaged and asking questions.     Patient is able to verbalize signs/symptoms fluid overload/impending HF exacerbation and who to contact with problems                                          _X__ yes  ___ no      Patient is following a 2000 mg sodium diet                                             ___ yes  X___ no    If no, barriers to 2000 mg sodium diet: Patient was not told to follow a low sodium diet prior to admission, said she will follow moving forward.     Patient informed of 2-Part dietician classes that is free if sign up within 30 days of discharge or $40  _X__ yes  ___ no      Patient is adherent to medication regimen                                              __X_ yes  ___ no    If no, barriers to medication regimen:    Patient has sufficient funds to purchase medication                      _X__ yes  ___ no      Patient has a scale in the home              _X__ yes  ___ no      Patient is adherent to daily weight monitoring                                        ___ yes   _X__ no    If no, barriers to daily weight monitoring: Patient was not told to weigh herself daily prior to admission, will do so moving forward.     Symptom Tracker Worksheet reviewed with patient  _X__ yes   ___ no      Patient verbalizes understanding of stoplight/heart failure zones          _X__ yes   ___ no      Patient understands the importance of 7-day follow-up appointment      _X__ yes  ___ no    Appointment Date:          Patient has adequate transportation to attend follow-up  appointments    _X__ yes  ___ no    If no, was referral to Social Work made  ___yes  ___ no      Family/Friend present during education:  Attila     Additional consultations required: ARJUN Rios RN (signature)  Extension [phone]

## 2025-07-14 NOTE — DISCHARGE INSTRUCTIONS
Going Home Instructions  In this section you will find the tools which will guide you through the first few days after you leave the hospital. Continued use of these tools will help you develop the skills necessary to keep your heart failure under control.     Home Care Instructions Following Heart Failure - the most important things to do every day include:   Weigh yourself and review the “Self-Check Plan” sheet every morning.   Call your cardiologist office if you are in the “Pay Attention-Use Caution” (yellow zone) or “Medical Alert-Warning!” (red zone) as outlined in the Self-Check Plan sheet.  Take your medicines as prescribed.  Limit your sodium (salt) intake.  Know when to call your cardiologist, primary doctor, or nurse.  Know when to seek emergency care.      Things for You to Remember:   1. See your doctor or healthcare provider as written on your discharge instructions.  It is important that you attend this appointment to make sure your symptoms are under control.     2. Your recommended sodium intake is 2407-4859 mg daily.    3.  Weigh yourself every day.    4. Some exercise and activity is important to help keep your heart functioning and strong. Unless instructed not to exercise, you may walk at a slow to moderate pace for 10-15 minutes 2-3 days per week to start. Pace your activity to prevent shortness of breath or fatigue. Stop exercising if you develop chest pain, lightheadedness, or significant shortness of breath.       Call Your Cardiologist If:   You gain 2-3 pounds in one day or 5 pounds in one week.  You have more difficulty breathing.  You are getting more tired with normal activity.  You are more short of breath lying down, or awaken at night short of breath.  You have swelling of your feet or legs.  You urinate less often during the day and more often at night.  You have cramps in your legs.  You have blurred vision or see yellowish-green halos around objects of lights.    Go to the  Emergency Room If:   You have pain or tightness in your chest  You are extremely short of breath  You are coughing up pink-frothy mucus  You are traveling and develop symptoms of worsening heart failure      ** Please follow up with your cardiologist or Advanced Practice Provider as written on your discharge instructions. If you are not provided with an appointment, let your nurse know so you can get an appointment**          HOME CARE INSTRUCTIONS FOLLOWING CORONARY ANGIOGRAPHY      Activity  DO NOT drive after the procedure.  You may resume driving late the following day according to the nurse or physician's instructions  Plan on resting and relaxing tonight and tomorrow  Resume your normal activity after 48 hours, or as instructed by your physician  Do not lift anything over 10 pounds for the next 24 hours  Avoid drinking alcohol for the next 24 hours  If the groin site was used, avoid repeated stair use and excessive walking for the next 24 hours  If the wrist was used, avoid bending/flexing of the wrist for the next 24 hours     What is Normal?  A small lump at the procedure site associated with mild tenderness when touched  The procedure site may be bruised or discolored  There may be a small amount of drainage on the bandage     Special Instructions  Drink plenty of fluids during the next 24 hours to \"flush\" the contrast from your system  The bandage is to remain in place for 24 hours  Keep the bandage clean and dry  DO NOT submerge the procedure site for 72 hours (no bath tubs or pools)  This includes dishwashing/submersion of the wrist, if the wrist was used  After 24 hours, you must remove the bandage  You should shower after removing the bandage, and wash the procedure site gently with soap and water  If you choose to wear a bandage for a few days, make sure it remains clean and dry and that it is changed daily     When you should NOTIFY YOUR PHYSICIAN  Bleeding can occur at the procedure site - both on  the outside of the skin and/or beneath the surface of the skin  Swelling or a large lump at the procedure site can occur, which may be accompanied by moderate to severe pain     If either of the above occurs, lie down flat.  Have someone apply pressure to the procedure site with both hands, as instructed by the nurse.  Hold pressure for 20 minutes and the bleeding should stop.  Notify your physician of the occurrence  If the bleeding does not stop, call 911 and continue to apply pressure     If you experience signs of a fever, temperature > 101°, chills, infection (redness, swelling, thick yellow drainage, or a foul odor from the procedure site)  If you notice any numbness, tingling, or loss of feeling to your leg or foot or groin access  If you notice any numbness, tingling, or loss of feeling to your fingers or hand, if wrist access was utilized     Other  You may resume your present diet, unless otherwise specified by your physician.  You may resume all of your medications as prescribed, unless otherwise directed by your physician.  A list of your medications was provided to you at discharge.  Continue the walking program initiated in the hospital and progress your walking as directed.  Or, gradually resume your previous aerobic exercise schedule as tolerated.  Please call your physician’s office for a follow-up appointment.  You should be seen in 2 weeks.

## 2025-07-14 NOTE — DIETARY NOTE
[facility]   part of [facility]   CLINICAL NUTRITION    [place] Cha Hernandez     Admitting diagnosis:  Elevated blood pressure reading [R03.0]  GAMEZ (dyspnea on exertion) [R06.09]  Exertional chest pain [R07.9]  Elevated brain natriuretic peptide (BNP) level [R79.89]    Ht: 162.6 cm (5' 4\")  Wt: 114.5 kg (252 lb 6.8 oz).   Body mass index is 43.33 kg/m².  IBW: 54.5kg    Wt Readings from Last 6 Encounters:   07/14/25 114.5 kg (252 lb 6.8 oz)   04/04/25 114.1 kg (251 lb 8 oz)   01/22/25 117.3 kg (258 lb 11.2 oz)   08/06/24 112.5 kg (248 lb)   07/11/24 111.6 kg (246 lb)   01/10/24 123.4 kg (272 lb)        Labs/Meds reviewed    Diet:       Procedures    NPO    Cardiac diet Cardiac; Fluid Restriction: 1800 ml; Is Patient on Accuchecks? No     Percent Meals Eaten (last 3 days)       Date/Time Percent Meals Eaten (%)    07/12/25 2014 100 %    07/13/25 0807 100 %    07/13/25 1346 100 %              Pt chart reviewed d/t CHF ed consult. Provided pt with low sodium nutrition therapy handout from [facility]. Pt was receptive to information.   Patient reports good appetite at this time.  Nursing notes reports Percent Meals Eaten (%): 100 % intake for last meal.  Tolerating po diet without diarrhea, emesis, or constipation.   No significant weight changes noted.     Patient is at low nutrition risk at this time.    Please consult if patient status changes or nutrition issues arise.    Kamaljit Joseph RD, LDN  Clinical Nutrition   Available via Epic secure chat

## 2025-07-14 NOTE — PAYOR COMM NOTE
ADMISSION REVIEW     Payor: [employer] ADV PPO  Subscriber #:  [id number]  Authorization Number: [id number]    Admit date: 7/12/25  Admit time:  5:36 PM       REVIEW DOCUMENTATION:     ED Provider Notes        ED Provider Notes signed by Gil Solis DO at 7/12/2025  4:37 PM       Author: Gil Solis DO Service: -- Author Type: Physician    Filed: 7/12/2025  4:37 PM Date of Service: 7/12/2025  3:52 PM Status: Signed    : Gil Solis DO (Physician)         80-year-old female, history of  Patient Seen in: [facility] Emergency Department        History  Chief Complaint   Patient presents with    Difficulty Breathing     can't catch breath, arm pain, a fib, and retaining water,     Stated Complaint: can't catch breath, arm pain, a fib, and retaining water,    Subjective:   80-year-old female, history of hypertension, history of A-fib on Eliquis, presents with weight gain, dyspnea on exertion, orthopnea, chest pain with exertion ongoing for several months, worse in the past couple of weeks.  Went to [facility] yesterday, was seen, told she needed outpatient echo and possibly CT scan in the next couple of weeks, states her symptoms are not getting any better so she came here today for evaluation.  No current pain.  States when she exerts her self she can barely walk 10 feet without getting short of breath.  She has been compliant with her anticoagulation.  Reports unintentional weight gain.  Placed orthopnea.  No known history of CHF.  Last echo looks echo was years ago with an EF 55%.  Had an ablation for A-fib in 2020            Objective:     Past Medical History:    Allergic rhinitis due to allergen    Breast cancer (HCC)    Invasive Ductal Carcinoma-Grade 1    Calculus of kidney    Cervix dysplasia    LUZ 3 with laser cone March 2010    Essential hypertension    Exposure to medical diagnostic radiation    Hyperlipidemia    Left bundle branch block    NAFLD (nonalcoholic fatty liver  disease)    Diffuse fatty infiltration of the liver on ultrasound August 2011    Pneumonia due to organism    LEFT LUNG    Primary osteoarthritis of both hips    Primary osteoarthritis of left knee    Primary osteoarthritis of right knee    Pulmonary embolism (HCC)    Occurred after a long plane ride ON HRT    Visual impairment    READERS     Past Surgical History:   Procedure Laterality Date    Cataract  10/14/08    Right eye    Cataract  10/28/08    Left eye    Colonoscopy  2016    Fracture surgery  2/11/93    Fractured L ankle. 1 plate and 8 scres    Lumpectomy left  2/3/16    L breast lumpectomy w/sentinel lymph node biopsy and lymph node dissection    Other surgical history  11/19/08    Oral surgery-2 cyst and 2 wisdom teeth    Other surgical history  3/13/09    Kidney stones removed    Other surgical history  1/20/10    Colposcopy    Other surgical history  12/11/12    R eye laser surgery-YAG Capsulotomy    Other surgical history  2/6/15    L eye laser surgery-YAG Capsulotomy    Other surgical history  1/5/16    L breast biopsy    Other surgical history  08/2020    ablation     Radiation left         Physical Exam    ED Triage Vitals [07/12/25 1526]   BP (!) 165/100   Pulse 58   Resp 16   Temp 98.7 °F (37.1 °C)   Temp src Temporal   SpO2 93 %   O2 Device None (Room air)       Current Vitals:   Vital Signs  BP: 128/67  Pulse: 79  Resp: 16  Temp: 98.7 °F (37.1 °C)  Temp src: Temporal  MAP (mmHg): 82    Oxygen Therapy  SpO2: 96 %  O2 Device: None (Room air)        Physical Exam  Vitals and nursing note reviewed.   Constitutional:       Appearance: She is not toxic-appearing.   Cardiovascular:      Rate and Rhythm: Normal rate. Rhythm irregular.      Pulses: Normal pulses.   Pulmonary:      Breath sounds: Examination of the right-lower field reveals decreased breath sounds. Examination of the left-lower field reveals decreased breath sounds. Decreased breath sounds present.   Chest:      Chest wall: No tenderness.    Musculoskeletal:      Cervical back: Normal range of motion and neck supple.      Right lower leg: No tenderness. Edema present.      Left lower leg: No tenderness. Edema present.   Skin:     General: Skin is warm and dry.   Neurological:      General: No focal deficit present.      Mental Status: She is alert.   Psychiatric:         Mood and Affect: Mood normal.         Behavior: Behavior normal.       Labs Reviewed   COMP METABOLIC PANEL (14) - Abnormal; Notable for the following components:       Result Value    Glucose 102 (*)     Creatinine 1.10 (*)     eGFR-Cr 51 (*)     AST 41 (*)     All other components within normal limits   PRO BETA NATRIURETIC PEPTIDE - Abnormal; Notable for the following components:    Pro-Beta Natriuretic Peptide 3,081 (*)     All other components within normal limits   PROTHROMBIN TIME (PT) - Abnormal; Notable for the following components:    PT 15.2 (*)     All other components within normal limits   TROPONIN I HIGH SENSITIVITY - Normal   PTT, ACTIVATED - Normal   CBC WITH DIFFERENTIAL WITH PLATELET   RAINBOW DRAW LAVENDER   RAINBOW DRAW LIGHT GREEN   RAINBOW DRAW BLUE   RAINBOW DRAW GOLD     EKG    Rate, intervals and axes as noted on EKG Report.  Rate: 107  Rhythm: Sinus Rhythm  Reading: EKG appears to be sinus rhythm with left bundle branch block and PVCs present.  No obvious ST elevations.  Frequent PVCs.  Left bundle branch block also present in 2020 but much more frequent PVCs at this time today      [facility]     XR CHEST AP PORTABLE  (CPT=71045)  Result Date: 7/12/2025  CONCLUSION: No acute radiographic findings. Electronically Verified and Signed by Attending Radiologist: Kathleen Peterson MD 7/12/2025 4:16 PM Workstation: [facility]    I independently interpreted the x-ray of the chest no obvious pleural effusion seen     at bedside helpful to provide information on the history of presenting illness    Differential diagnosis includes, but not limited to, CHF, PE, ACS,  unstable angina    External chart review demonstrates an encounter with [facility] yesterday but cannot see the details of the encounter      80-year-old female exertional chest pain, shortness of breath, intermittent left arm pain, unintentional weight gain ongoing for months worse in the past couple of weeks.  Started on torsemide yesterday, states no better today.  Called [facility], sent here.  EKG appears to be sinus rhythm with frequent PVCs, left lateral branch block which is chronic for her.  No recent echo.  No recent stress.  No recent cath.  Given Lasix here.  Talk to [facility], they will see in consultation order echo and further workup.  Admitted to Dr. Sanches, awaiting bed assignment      Admission disposition: 7/12/2025  4:27 PM      Medical Decision Making      Disposition and Plan     Clinical Impression:  1. GAMEZ (dyspnea on exertion)    2. Exertional chest pain    3. Elevated brain natriuretic peptide (BNP) level    4. Elevated blood pressure reading         Disposition:  Admit      Hospital Problems       Present on Admission  Date Reviewed: 4/5/2025          ICD-10-CM Noted POA    * (Principal) GAMEZ (dyspnea on exertion) R06.09 7/12/2025 Unknown              7/12    [facility]  History and Physical     Chief Complaint: Shortness of breath      Subjective:  History of Present Illness:      Linda Hernandez is a 80 year old female with past medical history of atrial fibrillation on Eliquis presents with shortness of breath.     Patient's endorsing a progressive worsening of shortness of breath.  She is having orthopnea and is having to sit up, she feels her lower extremities are swollen and she is gaining weight, she denies any chest discomfort.  No pain with deep breathing.  No fevers or chills.  No cough.     Objective:  Physical Exam:    /67   Pulse 79   Temp 98.7 °F (37.1 °C) (Temporal)   Resp 16   Ht 5' 4\" (1.626 m)   Wt 264 lb (119.7 kg)   LMP  (LMP Unknown)   SpO2 96%   BMI 45.32  kg/m²   General: No acute distress, Alert  Respiratory: diminished at bases   Cardiovascular: S1, S2. IRIR  Abdomen: Soft, Non-tender, non-distended, positive bowel sounds  Neuro: No new focal deficits  Extremities: 1+ LE pitting edema          Recent Labs   Lab 07/12/25  1533   PBNP 3,081*      Assessment & Plan:  #GAMEZ, Weight gain, presentation consistent with CHF, no previous history  -recently started on Torsemide outpatient, IV diuretics per cardiology on consult  -ECHO     #paroxysmal Atrial fibrillation  -Metoprolol  -Eliquis     #HLD  -Statin     #Morbid Obesity  Body mass index is 45.32 kg/m².     Plan of care discussed with patient, ED MD Pauly Sanches, DO          7/13        CARDIOLOGY CONSULT NOTE            Reason for Consultation:      Atrial fibrillation, heart failure     History of Present Illness:   Patient is a 80 year old female with history of persistent A-fib, left bundle branch block, history of ablation in 2020, hyperlipidemia who presents to the emergency room with progressive shortness of breath with exertion and occasional left arm pain.  She had a visit with one of our nurse practitioners in the office 2 days ago.  Complaining of the symptoms.  7-day monitor, echo, CTA coronary artery scan was ordered.  Given persistent symptoms patient presented to the emergency room.  She is in A-fib now.  And up some weight and fluid.         Physical Exam:     General: Alert and oriented. No apparent distress. No respiratory or constitutional distress.  HEENT: Normocephalic, anicteric sclera, neck supple  Neck: No JVD, carotids 2+, supple  Cardiac: Irregular, normal rate  Lungs: Clear with normal effort.  Normal excursions and effort.  Abdomen: Soft, non-tender. BS-present.  Extremities: 1+ lower extreme edema      Impression:   1.  Paroxysmal atrial fibrillation-history of cardioversion and ablation, now back in A-fib I suspect some of her symptoms are related to this as well as some of the  volume overload.  She is complaining of left arm pain she had a net negative PET scan in March.  Will plan to rate control today.  Continue her Eliquis.  Plan for cardioversion tomorrow, likely amnio after and possible ablation in the future.  Discussed this with EP.  2.  Acute on chronic HFpEF-will get an echo to confirm her EF has not changed and has been a couple years.  Will start IV Lasix for now.  Resume her home meds  3.  Hypertension-on metoprolol  4.  Hyperlipidemia  5.  MARY LOU-needs to restart CPAP     Recommendations:  IV Lasix 40 mg twice daily  Continue Eliquis  Continue metoprolol  Plan for cardioversion tomorrow     C 5     Osvaldo Mcgill MD  [facility]          Hospitalist Progress Note       feeling better and urinating a lot     Vital signs:  Temp:  [97.7 °F (36.5 °C)-98.7 °F (37.1 °C)] 98.2 °F (36.8 °C)  Pulse:  [] 96  Resp:  [16-18] 18  BP: (119-168)/() 144/114  SpO2:  [90 %-98 %] 91 %     Physical Exam:    General: No acute distress  Respiratory: No wheezes, diminished   Cardiovascular: S1, S2, IRIR  Abdomen: Soft, Non-tender, non-distended, positive bowel sounds  Neuro: No new focal deficits.   Extremities: 1+ LE edema       Assessment & Plan:  #GAMEZ, Weight gain, presentation consistent with CHF, no previous history  -recently started on Torsemide outpatient, IV diuretics per cardiology on consult  -ECHO pending     #Atrial fibrillation  -Metoprolol  -Eliquis     #HLD  -Statin     #Morbid Obesity  -Body mass index is 45.32 kg/m²        Pauly Sanches DO      MEDICATIONS ADMINISTERED IN LAST 1 DAY:  apixaban (Eliquis) tab 5 mg       Date Action Dose Route User    7/13/2025 2100 Given 5 mg Oral Reji Becerra RN          atorvastatin (Lipitor) tab 10 mg       Date Action Dose Route User    7/13/2025 2100 Given 10 mg Oral UgReji israel RN          furosemide (Lasix) 10 mg/mL injection 40 mg       Date Action Dose Route User    7/14/2025 1012 Given 40 mg Intravenous  Dolores Chappell RN    7/13/2025 1710 Given 40 mg Intravenous Anayeli Jenkins RN    7/13/2025 1103 Given 40 mg Intravenous Anayeli Jenkins RN          metoprolol succinate ER (Toprol XL) 24 hr tab 50 mg       Date Action Dose Route User    7/13/2025 2100 Given 50 mg Oral Reji Becerra RN          Perflutren Lipid Microsphere (DEFINITY) 6.52 MG/ML injection 1.5 mL       Date Action Dose Route User    7/13/2025 1629 Given 1.5 mL Intravenous Guglev-Jitendra, Stanimira          sodium chloride 0.9% infusion       Date Action Dose Route User    7/14/2025 0655 New Bag (none) Intravenous Reji Becerra RN            Vitals (last day)       Date/Time Temp Pulse Resp BP SpO2 Weight O2 Device O2 Flow Rate (L/min) [facility]    07/14/25 0836 97.7 °F (36.5 °C) 91 16 149/90 93 % -- None (Room air) 0 L/min BK    07/14/25 0500 97.9 °F (36.6 °C) 83 20 144/84 92 % 252 lb 6.8 oz (114.5 kg) CPAP -- AB    07/13/25 2340 97.6 °F (36.4 °C) 90 15 160/84 93 % -- None (Room air) -- AB    07/13/25 2212 -- 91 -- 151/89 93 % -- -- -- OU    07/13/25 2209 -- 89 -- 139/111 94 % -- -- -- OU    07/13/25 2020 97.7 °F (36.5 °C) 107 16 131/76 93 % -- None (Room air) -- AB    07/13/25 1843 -- 98 -- -- -- -- -- --     07/13/25 1817 -- 105 -- -- -- -- -- --     07/13/25 1731 -- 97 -- -- -- -- None (Room air) --     07/13/25 1731 97.9 °F (36.6 °C) -- 18 138/75 91 % -- -- --     07/13/25 1346 -- 96 -- -- -- -- -- --     07/13/25 1255 98.2 °F (36.8 °C) 99 18 144/114 91 % -- None (Room air) --     07/13/25 1100 -- 90 -- 137/71 92 % -- -- -- CM    07/13/25 0807 98.6 °F (37 °C) 87 16 142/67 90 % -- None (Room air) --     07/13/25 0719 -- 76 -- -- 92 % -- -- --     07/13/25 0419 97.7 °F (36.5 °C) 82 16 119/75 94 % -- -- -- PM    07/13/25 0350 -- -- -- -- -- 254 lb 6.6 oz (115.4 kg) -- --            Latest Reference Range & Units Most Recent   Sodium 136 - 145 mmol/L 142  7/14/25 09:50   Potassium 3.5 - 5.1 mmol/L 3.8  7/14/25 09:50   Chloride  98 - 112 mmol/L 103  7/14/25 09:50   Carbon Dioxide, Total 21.0 - 32.0 mmol/L 26.0  7/14/25 09:50   BUN 9 - 23 mg/dL 29 (H)  7/14/25 09:50   CREATININE 0.55 - 1.02 mg/dL 0.91  7/14/25 09:50   CALCIUM 8.7 - 10.6 mg/dL 10.0  7/14/25 09:50   EGFR >=60 mL/min/1.73m2 65  7/14/25 09:50   ANION GAP 0 - 18 mmol/L 13  7/14/25 09:50   CALCULATED OSMOLALITY 275 - 295 mOsm/kg 301 (H)  7/14/25 09:50   ALKALINE PHOSPHATASE 55 - 142 U/L 96  7/12/25 15:33   AST (SGOT) <34 U/L 41 (H)  7/12/25 15:33   ALT (SGPT) 10 - 49 U/L 49  7/12/25 15:33   Total Bilirubin 0.2 - 1.1 mg/dL 1.0  7/12/25 15:33   Globulin 2.0 - 3.5 g/dL 3.2  7/12/25 15:33   Magnesium, Serum 1.6 - 2.6 mg/dL 2.0  7/13/25 05:30   Iron, Serum 50 - 170 ug/dL 41 (L)  7/12/25 15:33   Transferrin 250 - 380 mg/dL 322  7/12/25 15:33   Iron Bind.Cap.(TIBC) 250 - 425 ug/dL 394  7/12/25 15:33   Iron Saturation 15 - 50 % 10 (L)  7/12/25 15:33   FERRITIN 50 - 306 ng/mL 85  7/13/25 05:30   Troponin I (High Sensitivity) <=34 ng/L 9  7/12/25 15:33   pro-BETA NATRIURETIC PEPTIDE <450 pg/mL 3,081 (H)  7/12/25 15:33   (H): Data is abnormally high  (L): Data is abnormally low

## 2025-07-14 NOTE — CONSULTS
[facility]  Cardiac EP Consultation    Linda Hernandez Patient Status:  Inpatient    1945 MRN [id number]   Location [facility] 2NE-A Attending Pauly Sanches,    Hosp Day # 2 PCP Kofi Blandon MD     Consults      Reason for Consultation     Atrial Fibrillation        History of Present Illness     Linda Hernandez is a a(n) 80 year old female with persistent AF, LBBB, Ablation , presented with dyspnea and noted to be in AF.        History:     Past Medical History[1]  Past Surgical History[2]  Family History[3]   reports that she has never smoked. She has never used smokeless tobacco. She reports current alcohol use. She reports that she does not use drugs.      Allergies:     Allergies[4]      Medications     Current Hospital Medications[5]      Review of Systems     10 point ROS was negative except        Telemetry:         Telemetry: AF      Physical Exam     Physical Exam   Blood pressure 113/63, pulse 100, temperature 97.5 °F (36.4 °C), temperature source Oral, resp. rate 16, height 5' 4\" (1.626 m), weight 252 lb 6.8 oz (114.5 kg), SpO2 92%, not currently breastfeeding.  Temp (24hrs), Av.7 °F (36.5 °C), Min:97.5 °F (36.4 °C), Max:97.9 °F (36.6 °C)    Wt Readings from Last 3 Encounters:   25 252 lb 6.8 oz (114.5 kg)   25 251 lb 8 oz (114.1 kg)   25 258 lb 11.2 oz (117.3 kg)       NAD  PERRLA/EOMI  Neck veins not elevated  Carotids- no bruits  CTA bilaterally  Cardiac- irreg irreg  Abdomen- Soft,Nontender, normal BS  Extremities- pulses normal  Edema-   Mood /Affect Congruent  Skin- no lesions            Lab/Radiology Results     Recent Labs   Lab 25  1533 25  0530 25  0950   * 122* 116*   BUN 20 25* 29*   CREATSERUM 1.10* 0.96 0.91   EGFRCR 51* 61 65   CA 10.3 9.6 10.0    143 142   K 3.6 4.2  4.2 3.8    105 103   CO2 26.0 30.0 26.0     Recent Labs   Lab 25  1533 25  0530   RBC 4.82 4.78   HGB 14.8 14.7    HCT 43.4 42.4   MCV 90.0 88.7   MCH 30.7 30.8   MCHC 34.1 34.7   RDW 15.1 15.4   NEPRELIM 4.34 3.09   WBC 7.6 5.9   .0 258.0         [unfilled]  No results for input(s): \"BNP\" in the last 168 hours.  Lab Results   Component Value Date    INR 1.18 07/12/2025     No results found for: \"TROP\"      No results found.   EKG  Result Date: 7/14/2025  Atrial fibrillation Premature ventricular complexes Left axis deviation Left bundle branch block Abnormal ECG When compared with ECG of 11-SEP-2020 11:01, Atrial fibrillation now present T wave inversion now evident in Lateral leads Confirmed by COLLIN MCDOWELL ([id number]) on 7/14/2025 8:04:37 AM    EKG 12 Lead  Result Date: 7/14/2025  Normal sinus rhythm Premature ventricular complexes Right bundle branch block Marked T-wave abnormality, consider inferolateral ischemia Abnormal ECG When compared with ECG of 12-JUL-2025 15:05, No significant change was found Confirmed by COLLIN MCDOWELL ([id number]) on 7/14/2025 7:59:17 AM        Problem List   Problem List[6]    Diagnostic Testing     ECG 7/14/25       TTE 7/2025  EF 35%         EP Procedures 2020  Cryo= AF- Dr. Avalos       Assessment     Recurrent AF- Ablation 2020- Cryo. Robbie  New drop in EF  Cath tomorrow  Frequent PVCs  Potentially an etiology for CM  RBBB/Inferior axis  HTN  HL  MARY LOU      Plan     Cath  ALEXIS/DCCV ( Amio or Tikosyn)-if no significant coronary artery disease  Potential etiologies of the drop in EF could be coronary disease, frequent PVCs, A-fib, left bundle branch block          Cardiac EP- C5      Collin Mcdowell MD    Cardiac Electrophysiology  [facility]  7/14/2025  2:49 PM         [1]   Past Medical History:   Allergic rhinitis due to allergen    Breast cancer (HCC)    Invasive Ductal Carcinoma-Grade 1    Calculus of kidney    Cervix dysplasia    LUZ 3 with laser cone March 2010    Essential hypertension    Exposure to medical diagnostic radiation    Hyperlipidemia    Left bundle  branch block    NAFLD (nonalcoholic fatty liver disease)    Diffuse fatty infiltration of the liver on ultrasound August 2011    Pneumonia due to organism    LEFT LUNG    Primary osteoarthritis of both hips    Primary osteoarthritis of left knee    Primary osteoarthritis of right knee    Pulmonary embolism (HCC)    Occurred after a long plane ride ON HRT    Visual impairment    READERS   [2]   Past Surgical History:  Procedure Laterality Date    Cataract  10/14/08    Right eye    Cataract  10/28/08    Left eye    Colonoscopy  2016    Fracture surgery  2/11/93    Fractured L ankle. 1 plate and 8 scres    Lumpectomy left  2/3/16    L breast lumpectomy w/sentinel lymph node biopsy and lymph node dissection    Other surgical history  11/19/08    Oral surgery-2 cyst and 2 wisdom teeth    Other surgical history  3/13/09    Kidney stones removed    Other surgical history  1/20/10    Colposcopy    Other surgical history  12/11/12    R eye laser surgery-YAG Capsulotomy    Other surgical history  2/6/15    L eye laser surgery-YAG Capsulotomy    Other surgical history  1/5/16    L breast biopsy    Other surgical history  08/2020    ablation     Radiation left     [3]   Family History  Adopted: Yes   Problem Relation Age of Onset    Breast Cancer Self 68    Stroke Mother     Heart Attack Father 38    BRCA gene + Sister     Breast Cancer Sister 50        estimate    Breast Cancer Maternal Grandmother    [4]   Allergies  Allergen Reactions    Sotalol SHORTNESS OF BREATH    Sulfa Antibiotics HIVES, SWELLING, ANGIOEDEMA and OTHER (SEE COMMENTS)     CLASS    Doxycycline ITCHING     Per pt: \"made me feel sick-quit taking\"    Keflex [Cephalexin] OTHER (SEE COMMENTS)     Cough and sweats    Lisinopril Coughing     cough    Valsartan Coughing   [5]   Current Facility-Administered Medications:     [START ON 7/15/2025] sodium chloride 0.9% infusion, , Intravenous, On Call    [START ON 7/15/2025] sodium chloride 0.9% infusion, ,  Intravenous, Continuous    benzocaine (Hurricaine/Topex) 20 % mouth spray 1 spray, 1 spray, Mouth/Throat, See Admin Instructions    spironolactone (Aldactone) tab 25 mg, 25 mg, Oral, Daily    furosemide (Lasix) 10 mg/mL injection 40 mg, 40 mg, Intravenous, BID (Diuretic)    [Held by provider] apixaban (Eliquis) tab 5 mg, 5 mg, Oral, BID    atorvastatin (Lipitor) tab 10 mg, 10 mg, Oral, Nightly    acetaminophen (Tylenol Extra Strength) tab 1,000 mg, 1,000 mg, Oral, Q8H PRN    melatonin tab 3 mg, 3 mg, Oral, Nightly PRN    polyethylene glycol (PEG 3350) (Miralax) 17 g oral packet 17 g, 17 g, Oral, Daily PRN    sennosides (Senokot) tab 17.2 mg, 17.2 mg, Oral, Nightly PRN    bisacodyl (Dulcolax) 10 MG rectal suppository 10 mg, 10 mg, Rectal, Daily PRN    fleet enema (Fleet) rectal enema 133 mL, 1 enema, Rectal, Once PRN    ondansetron (Zofran) 4 MG/2ML injection 4 mg, 4 mg, Intravenous, Q6H PRN    metoclopramide (Reglan) 5 mg/mL injection 10 mg, 10 mg, Intravenous, Q8H PRN    benzonatate (Tessalon) cap 200 mg, 200 mg, Oral, TID PRN    guaiFENesin (Robitussin) 100 MG/5 ML oral liquid 200 mg, 200 mg, Oral, Q4H PRN    glycerin-hypromellose- (Artificial Tears) 0.2-0.2-1 % ophthalmic solution 1 drop, 1 drop, Both Eyes, QID PRN    sodium chloride (Saline Mist) 0.65 % nasal solution 1 spray, 1 spray, Each Nare, Q3H PRN    metoprolol succinate ER (Toprol XL) 24 hr tab 50 mg, 50 mg, Oral, Nightly  [6]   Patient Active Problem List  Diagnosis    Hyperlipidemia    Hypertension    Severe obesity (BMI >= 40) (HCC)    Atrial fibrillation (HCC)    MARY LOU on CPAP    Left bundle branch block (LBBB)    GAMEZ (dyspnea on exertion)    Exertional chest pain    Elevated brain natriuretic peptide (BNP) level    Elevated blood pressure reading

## 2025-07-14 NOTE — PROGRESS NOTES
[facility] Cardiology Progress Note    Linda Hernandez Patient Status:  Inpatient    1945 MRN [id number]   Location [facility] 2NE-A Attending Pauly Sanches,    Hosp Day # 2 PCP Kofi Blandon MD     Subjective:  No acute events overnight  SOB and orthopnea improving  Has more energy  No  chest pain this admisison     Objective:  /90 (BP Location: Right arm)   Pulse 91   Temp 97.7 °F (36.5 °C) (Oral)   Resp 16   Ht 5' 4\" (1.626 m)   Wt 252 lb 6.8 oz (114.5 kg)   LMP  (LMP Unknown)   SpO2 93%   BMI 43.33 kg/m²     Telemetry: Afib, with pvcs, ? Nsvt vs abbarent afib with lbbbb      Intake/Output:    Intake/Output Summary (Last 24 hours) at 2025 0848  Last data filed at 2025 0836  Gross per 24 hour   Intake 280 ml   Output 2150 ml   Net -1870 ml       Last 3 Weights   25 0500 252 lb 6.8 oz (114.5 kg)   25 0350 254 lb 6.6 oz (115.4 kg)   25 1753 256 lb 6.3 oz (116.3 kg)   25 1526 264 lb (119.7 kg)   25 1003 251 lb 8 oz (114.1 kg)   25 1342 258 lb 11.2 oz (117.3 kg)       Labs:  Recent Labs   Lab 25  1533 25  0530   * 122*   BUN 20 25*   CREATSERUM 1.10* 0.96   EGFRCR 51* 61   CA 10.3 9.6    143   K 3.6 4.2  4.2    105   CO2 26.0 30.0     Recent Labs   Lab 25  1533 25  0530   RBC 4.82 4.78   HGB 14.8 14.7   HCT 43.4 42.4   MCV 90.0 88.7   MCH 30.7 30.8   MCHC 34.1 34.7   RDW 15.1 15.4   NEPRELIM 4.34 3.09   WBC 7.6 5.9   .0 258.0         Recent Labs   Lab 25  1533   TROPHS 9       Diagnostics:             Physical Exam:    Physical Exam  Cardiovascular:      Rate and Rhythm: Tachycardia present. Rhythm irregular.   Pulmonary:      Effort: Pulmonary effort is normal.   Abdominal:      Palpations: Abdomen is soft.   Musculoskeletal:      Right lower leg: No edema.      Left lower leg: No edema.   Neurological:      Mental Status: She is alert and oriented to person, place, and  time.         Medications:  Scheduled Medications[1]  Medication Infusions[2]    Assessment:  81yo presented with progressive SOB, occasional left arm pain.     Acute HFrEF  EF 30-35% with hypokinetic inferolateral, inferior, and inferoseptal walls; mild MR   Plan for right and left cath tomorrow  Good diuresis on iv lasix, down 2.6L/4lbs  GDMT: on metoprolol xl, h/o lisinopril cough, ? ARB cough???,   Paroxysmal Afib s/p ablation   Recurrent  Plan for ALEXIS/DCCV likely this admission   On Eliquis  Chronic LBBB  HTN  HL  MARY LOU- off cpap since nasal surgery, will need to consider restarting     Plan:    Continue IV lasix  Add Aldactone 25mg daily   Will consider trial of losartan vs entresto given \"cough\" with Valsartan noted.   Cost check SGLT2i for discharge.   Check iron, ferritin  Right and left heart cath tomorrow, possible ALEXIS guided DCCV to follow pending results.   Hold eliquis for procedures tomorrow.   HF  to see, ideally follow up with Dr. Osvaldo lee or Bridget viera 1 week on discharge.     Nicole Thomas, APRN  7/14/2025  8:48 AM  Ph [phone] ([place])  Ph [phone] ([place])        Physician addendum:  Seen and examined, agree with note as written above, MDM per me    Acute heart failure with reduced ejection fraction-EF of 30 to 35% thought to be possibly A-fib related though will need ischemic evaluation  Paroxysmal atrial fibrillation currently in A-fib, chronic left bundle branch block  Hypertension  Lipidemia  MARY LOU    Plan:  Will continue IV diuretics today  Add Aldactone, continue metoprolol, will need to consider some sort of ACE or ARB may be Arni however she has had allergies in the past  Will plan for left and right heart cath tomorrow pending washout of the Eliquis  Can start heparin drip  After this we will plan for cardioversion    L3    Osvaldo Lee MD  Intervention cardiology  [facility] [place] [facility] [place]       [1]    furosemide  40 mg Intravenous BID (Diuretic)     [Held by provider] apixaban  5 mg Oral BID    atorvastatin  10 mg Oral Nightly    metoprolol succinate ER  50 mg Oral Nightly   [2]    sodium chloride 20 mL/hr at 07/14/25 0655

## 2025-07-15 ENCOUNTER — APPOINTMENT (OUTPATIENT)
Dept: INTERVENTIONAL RADIOLOGY/VASCULAR | Facility: HOSPITAL | Age: 80
DRG: 286 | End: 2025-07-15
Attending: NURSE PRACTITIONER
Payer: MEDICARE

## 2025-07-15 ENCOUNTER — APPOINTMENT (OUTPATIENT)
Dept: INTERVENTIONAL RADIOLOGY/VASCULAR | Facility: HOSPITAL | Age: 80
End: 2025-07-15
Attending: NURSE PRACTITIONER
Payer: MEDICARE

## 2025-07-15 PROBLEM — Z51.81 THERAPEUTIC DRUG MONITORING: Status: ACTIVE | Noted: 2025-07-15

## 2025-07-15 LAB
ANION GAP SERPL CALC-SCNC: 7 MMOL/L (ref 0–18)
APTT PPP: 55.9 SECONDS (ref 23–36)
BUN BLD-MCNC: 28 MG/DL (ref 9–23)
CALCIUM BLD-MCNC: 9.9 MG/DL (ref 8.7–10.6)
CHLORIDE SERPL-SCNC: 105 MMOL/L (ref 98–112)
CO2 SERPL-SCNC: 30 MMOL/L (ref 21–32)
CREAT BLD-MCNC: 1.04 MG/DL (ref 0.55–1.02)
EGFRCR SERPLBLD CKD-EPI 2021: 55 ML/MIN/1.73M2 (ref 60–?)
ERYTHROCYTE [DISTWIDTH] IN BLOOD BY AUTOMATED COUNT: 15.4 %
GLUCOSE BLD-MCNC: 114 MG/DL (ref 70–99)
HCT VFR BLD AUTO: 46.7 % (ref 35–48)
HGB BLD-MCNC: 15.9 G/DL (ref 12–16)
MCH RBC QN AUTO: 31 PG (ref 26–34)
MCHC RBC AUTO-ENTMCNC: 34 G/DL (ref 31–37)
MCV RBC AUTO: 91 FL (ref 80–100)
OSMOLALITY SERPL CALC.SUM OF ELEC: 300 MOSM/KG (ref 275–295)
PLATELET # BLD AUTO: 255 10(3)UL (ref 150–450)
PLATELET # BLD AUTO: 255 10(3)UL (ref 150–450)
POTASSIUM SERPL-SCNC: 3.9 MMOL/L (ref 3.5–5.1)
POTASSIUM SERPL-SCNC: 3.9 MMOL/L (ref 3.5–5.1)
RBC # BLD AUTO: 5.13 X10(6)UL (ref 3.8–5.3)
SODIUM SERPL-SCNC: 142 MMOL/L (ref 136–145)
WBC # BLD AUTO: 6.9 X10(3) UL (ref 4–11)

## 2025-07-15 PROCEDURE — B2111ZZ FLUOROSCOPY OF MULTIPLE CORONARY ARTERIES USING LOW OSMOLAR CONTRAST: ICD-10-PCS | Performed by: INTERNAL MEDICINE

## 2025-07-15 PROCEDURE — 4A023N8 MEASUREMENT OF CARDIAC SAMPLING AND PRESSURE, BILATERAL, PERCUTANEOUS APPROACH: ICD-10-PCS | Performed by: INTERNAL MEDICINE

## 2025-07-15 PROCEDURE — 99233 SBSQ HOSP IP/OBS HIGH 50: CPT | Performed by: INTERNAL MEDICINE

## 2025-07-15 PROCEDURE — 4A033BC MEASUREMENT OF ARTERIAL PRESSURE, CORONARY, PERCUTANEOUS APPROACH: ICD-10-PCS | Performed by: INTERNAL MEDICINE

## 2025-07-15 PROCEDURE — B2151ZZ FLUOROSCOPY OF LEFT HEART USING LOW OSMOLAR CONTRAST: ICD-10-PCS | Performed by: INTERNAL MEDICINE

## 2025-07-15 RX ORDER — NITROGLYCERIN 20 MG/100ML
INJECTION INTRAVENOUS
Status: COMPLETED
Start: 2025-07-15 | End: 2025-07-15

## 2025-07-15 RX ORDER — SACUBITRIL AND VALSARTAN 24; 26 MG/1; MG/1
1 TABLET, FILM COATED ORAL 2 TIMES DAILY
Qty: 60 TABLET | Refills: 1 | Status: SHIPPED | OUTPATIENT
Start: 2025-07-15

## 2025-07-15 RX ORDER — LIDOCAINE HYDROCHLORIDE 10 MG/ML
INJECTION, SOLUTION EPIDURAL; INFILTRATION; INTRACAUDAL; PERINEURAL
Status: COMPLETED
Start: 2025-07-15 | End: 2025-07-15

## 2025-07-15 RX ORDER — VERAPAMIL HYDROCHLORIDE 2.5 MG/ML
INJECTION INTRAVENOUS
Status: COMPLETED
Start: 2025-07-15 | End: 2025-07-15

## 2025-07-15 RX ORDER — HEPARIN SODIUM 5000 [USP'U]/ML
INJECTION, SOLUTION INTRAVENOUS; SUBCUTANEOUS
Status: COMPLETED
Start: 2025-07-15 | End: 2025-07-15

## 2025-07-15 RX ORDER — SACUBITRIL AND VALSARTAN 24; 26 MG/1; MG/1
1 TABLET, FILM COATED ORAL 2 TIMES DAILY
Status: DISCONTINUED | OUTPATIENT
Start: 2025-07-15 | End: 2025-07-16

## 2025-07-15 RX ORDER — MIDAZOLAM HYDROCHLORIDE 1 MG/ML
INJECTION INTRAMUSCULAR; INTRAVENOUS
Status: COMPLETED
Start: 2025-07-15 | End: 2025-07-15

## 2025-07-15 RX ORDER — FUROSEMIDE 10 MG/ML
40 INJECTION INTRAMUSCULAR; INTRAVENOUS
Status: COMPLETED | OUTPATIENT
Start: 2025-07-15 | End: 2025-07-15

## 2025-07-15 NOTE — PROGRESS NOTES
[facility] Cardiology Progress Note    Linda Hernandez Patient Status:  Inpatient    1945 MRN [id number]   Location [facility] 2NE-A Attending Pauly Sanches,    Hosp Day # 3 PCP Kofi Blandon MD     Subjective:  No acute events overnight  Wants to go home  Sob improving    Objective:  /87 (BP Location: Right arm)   Pulse 81   Temp 98.1 °F (36.7 °C) (Oral)   Resp 18   Ht 5' 4\" (1.626 m)   Wt 253 lb 1.4 oz (114.8 kg)   LMP  (LMP Unknown)   SpO2 96%   BMI 43.44 kg/m²     Telemetry: Afib with frequent pvcs       Intake/Output:    Intake/Output Summary (Last 24 hours) at 7/15/2025 0945  Last data filed at 7/15/2025 0300  Gross per 24 hour   Intake 75 ml   Output 1600 ml   Net -1525 ml       Last 3 Weights   07/15/25 0450 253 lb 1.4 oz (114.8 kg)   25 0500 252 lb 6.8 oz (114.5 kg)   25 0350 254 lb 6.6 oz (115.4 kg)   25 1753 256 lb 6.3 oz (116.3 kg)   25 1526 264 lb (119.7 kg)   25 1003 251 lb 8 oz (114.1 kg)   25 1342 258 lb 11.2 oz (117.3 kg)       Labs:  Recent Labs   Lab 25  0530 25  0950 07/15/25  0710   * 116* 114*   BUN 25* 29* 28*   CREATSERUM 0.96 0.91 1.04*   EGFRCR 61 65 55*   CA 9.6 10.0 9.9    142 142   K 4.2  4.2 3.8 3.9  3.9    103 105   CO2 30.0 26.0 30.0     Recent Labs   Lab 25  1533 25  0530 25  1621 07/15/25  0709   RBC 4.82 4.78 5.09 5.13   HGB 14.8 14.7 15.8 15.9   HCT 43.4 42.4 46.0 46.7   MCV 90.0 88.7 90.4 91.0   MCH 30.7 30.8 31.0 31.0   MCHC 34.1 34.7 34.3 34.0   RDW 15.1 15.4 15.2 15.4   NEPRELIM 4.34 3.09  --   --    WBC 7.6 5.9 8.5 6.9   .0 258.0 283.0 255.0  255.0         Recent Labs   Lab 25  1533   TROPHS 9       Diagnostics:             Physical Exam:    Physical Exam  Constitutional:       General: She is not in acute distress.     Appearance: She is obese.   Cardiovascular:      Rate and Rhythm: Normal rate. Rhythm irregular.   Pulmonary:       Effort: Pulmonary effort is normal.      Breath sounds: No rales.   Abdominal:      Palpations: Abdomen is soft.   Musculoskeletal:      Right lower leg: No edema.      Left lower leg: No edema.   Neurological:      Mental Status: She is alert and oriented to person, place, and time.         Medications:  Scheduled Medications[1]  Medication Infusions[2]    Assessment:  79yo presented with progressive SOB, occasional left arm pain.     Acute HFrEF  EF 30-35% with hypokinetic inferolateral, inferior, and inferoseptal walls; mild MR   Plan for right and left cath today  Good diuresis on iv lasix, down 2.6L/4lbs  GDMT: on metoprolol xl, h/o lisinopril cough, ? ARB cough???,   Paroxysmal Afib s/p ablation   Recurrent  Plan for ALEXIS/DCCV, potentially to follow angiogram today  May need antiarythmic   On Eliquis historically, on hold for procedure today but was started on heparin gtt to avoid interrupting anticoagulation   Chronic LBBB  Frequent PVCs  HTN  HL  MARY LOU- off cpap since nasal surgery, will need to consider restarting     Plan:    Await  right and left heart cath today  Cost check entresto and if reasonable initiate, if not truy losartan.  Previous listed reaction from valsartan of cough, but unlikely the case and would try alternative   DCCV potentially today vs tomorrow with ALEXIS    Nicole Thomas, JOSE  7/15/2025  9:45 AM  Ph [phone] ([place])  Ph [phone] ([place])      Physician addendum:  I have seen and examined the patient agree with note as written by APN above.  Medical decision making per myself    Acute heart failure with reduced ejection fraction-EF 30 to 35%  Paroxysmal atrial fibrillation-now with A-fib with RVR  Chronic left bundle branch block  Frequent PVCs  Hypertension  Hyperlipidemia    Plan:  Left and right heart cath today for evaluation of her cardiomyopathy though suspect it is related to her A-fib  Plan to continue up titration of her GDMT  Post cath likely tomorrow will  plan for ALEXIS cardioversion  Continue IV Lasix    L3    Osvaldo Mcgill MD  ITV Cardiology  [facility]         [1]    sodium chloride   Intravenous On Call    benzocaine  1 spray Mouth/Throat See Admin Instructions    spironolactone  25 mg Oral Daily    furosemide  40 mg Intravenous BID (Diuretic)    [Held by provider] apixaban  5 mg Oral BID    atorvastatin  10 mg Oral Nightly    metoprolol succinate ER  50 mg Oral Nightly   [2]    sodium chloride Stopped (07/15/25 0000)    continuous dose heparin 1,200 Units/hr (07/15/25 0737)

## 2025-07-15 NOTE — PAYOR COMM NOTE
Reconsideration request for In Patient services          CONTINUED STAY REVIEW    Payor: [employer] ADV PPO  Subscriber #:  [id number]  Authorization Number: [id number]    Admit date: 7/12/25  Admit time:  5:36 PM    REVIEW DOCUMENTATION:      7/14      Cardiac EP Consultation          Reason for Consultation      Atrial Fibrillation           History of Present Illness      Linda Hernandez is a a(n) 80 year old female with persistent AF, LBBB, Ablation 2020, presented with dyspnea and noted to be in AF.       Telemetry: AF        Physical Exam      Physical Exam   Blood pressure 113/63, pulse 100, temperature 97.5 °F (36.4 °C), temperature source Oral, resp. rate 16      Assessment      Recurrent AF- Ablation 2020- Cryo. Robbie  New drop in EF  Cath tomorrow  Frequent PVCs  Potentially an etiology for CM  RBBB/Inferior axis  HTN  HL  MARY LOU        Plan      Cath  ALEXIS/DCCV ( Amio or Tikosyn)-if no significant coronary artery disease  Potential etiologies of the drop in EF could be coronary disease, frequent PVCs, A-fib, left bundle branch block              Cardiac EP- C5       Collin Mcdowell MD             Hospitalist Progress Note    Chief Complaint: Dyspnea       Vital signs:  Temp:  [97.6 °F (36.4 °C)-98.2 °F (36.8 °C)] 97.7 °F (36.5 °C)  Pulse:  [] 91  Resp:  [15-20] 16  BP: (131-160)/() 149/90  SpO2:  [91 %-94 %] 93 %    Assessment & Plan:  #Acute HFrEF  -ECHO reviewed, EF 30-35% with Hypokinesis of the inferolateral, inferior and inferoseptal walls   -IV diuretics and GDMT per cardiology on consult  -C planned     #Atrial fibrillation  -Metoprolol  -Eliquis  -DCCV planned     #HLD  -Statin     #Morbid Obesity  -Body mass index is 45.32 kg/m²        Pauly Sanches DO             7/15       Hospitalist Progress Note         Vital signs:  Temp:  [97.5 °F (36.4 °C)-98.2 °F (36.8 °C)] 98.1 °F (36.7 °C)  Pulse:  [] 81  Resp:  [16-18] 18  BP: (113-150)/(63-90) 143/87  SpO2:  [90 %-96  %] 96 %   Assessment & Plan:  #Acute HFrEF  -ECHO reviewed, EF 30-35% with Hypokinesis of the inferolateral, inferior and inferoseptal walls   -IV diuretics and GDMT per cardiology on consult  -[facility] today     #Atrial fibrillation  -Metoprolol  -Eliquis held for LHC, Heparin gtt  -DCCV planned     #HLD  -Statin     #Morbid Obesity  -Body mass index is 45.32 kg/m²     POC:  Scheduled for [facility] but did not sign consent form as had some questions/concerns about terminology, I reviewed and clarified with her and she is now willing and able to sign.         Pauly Sanches, DO         CARDIOLOGY    Physical Exam   Constitutional:       General: She is not in acute distress.     Appearance: She is obese.   Cardiovascular:      Rate and Rhythm: Normal rate. Rhythm irregular.      Assessment:  81yo presented with progressive SOB, occasional left arm pain.      Acute HFrEF  EF 30-35% with hypokinetic inferolateral, inferior, and inferoseptal walls; mild MR   Plan for right and left cath today  Good diuresis on iv lasix, down 2.6L/4lbs  GDMT: on metoprolol xl, h/o lisinopril cough, ? ARB cough???,   Paroxysmal Afib s/p ablation   Recurrent  Plan for ALEXIS/DCCV, potentially to follow angiogram today  May need antiarythmic   On Eliquis historically, on hold for procedure today but was started on heparin gtt to avoid interrupting anticoagulation   Chronic LBBB  Frequent PVCs  HTN  HL  MARY LOU- off cpap since nasal surgery, will need to consider restarting      Plan:     Await  right and left heart cath today  Cost check entresto and if reasonable initiate, if not truy losartan.  Previous listed reaction from valsartan of cough, but unlikely the case and would try alternative   DCCV potentially today vs tomorrow with ALEXIS     Nicole Thomas, JOSE  7/15/2025  9:45 AM  Ph [phone] ([place])  Ph [phone] ([place])       Physician addendum:  I have seen and examined the patient agree with note as written by APN above.  Medical  decision making per myself     Acute heart failure with reduced ejection fraction-EF 30 to 35%  Paroxysmal atrial fibrillation-now with A-fib with RVR  Chronic left bundle branch block  Frequent PVCs  Hypertension  Hyperlipidemia     Plan:  Left and right heart cath today for evaluation of her cardiomyopathy though suspect it is related to her A-fib  Plan to continue up titration of her GDMT  Post cath likely tomorrow will plan for ALEXIS cardioversion  Continue IV Lasix     L3     Osvaldo Mcgill MD      Cardiac Catheterization Note     Primary Proceduralist: Osvaldo Mcgill MD  Procedure Performed: LHC, RHC, and COR, IFR of proximal LAD  Date of Procedure: 7/15/2025   Indication: New cardiomyopathy  Pre Operative Diagnosis: New cardiomyopathy with HFrEF, atrial fibrillation, chest pain  Post Operative Diagnosis: Nonobstructive coronary disease, nonischemic cardiomyopathy  Estimated blood loss: Less than 5  Specimens: None     Consent:   Informed written consent was obtained from the patient after risk benefits and alternative explained the patient.  Patient agreed to proceed     Sedation  IV conscious sedation was achieved with Versed and fentanyl.  It was administered under my direct supervision.  Hemodynamic monitoring took place throughout the entire procedure by myself in the Cath Lab staff.  4 mg of Versed and 50 mcg of Fentanyl were given.  Start Time: 1145 end Time: 1224        Description of the procedure: After written informed consent was obtained from the patient, patient was brought to the cardiac catheterization laboratory in a stable condition and fasting state.  Patient was prepped and draped in the usual sterile fashion.  IV conscious sedation was achieved with Versed and fentanyl.  Lidocaine 1% was used to infiltrate the right radial artery for local anesthesia and a 6 Citizen of [place] introducer sheath was inserted into the right radial artery.  Ultrasound-guided access was obtained for the basilic vein and a 5 Citizen of [place]  sheath was placed 5 [place] was advanced to the PA and hemodynamics were obtained.  A TIG catheter was then advanced over wire through the radial access site and was used to engage the left and right coronary systems perform diagnostic angiography.  A JR4 catheter was advanced in the LV to perform hemodynamic assessment.  There was a moderate lesion in the proximal LAD for which IFR was performed see results below        Coronary Angiogram Findings:  LM: Large caliber artery giving rise to LAD & LCX.  No significant disease  LAD: Large caliber artery giving rise to diagonal and septal branches.  Proximally there was a 50% stenosis, otherwise this was a tortuous vessel giving off diagonals which were free of obstructive disease  LCX: Large caliber artery giving rise to OM branches.  Tortuous vessel free of obstructive disease  RCA: Large caliber artery giving rise to acute marginal branches, and bifurcates into RPDA & RPL.  There is a 30 to 40% lesion in the mid RCA otherwise there are luminal irregularities.     Hemodynamics:  LVEDP 13 mmHg  No significant gradient upon Ao-LV pullback     RHC:   RA: 7  RV: 30/7/10  PA: 27/15/20  Wedge: 21     AO saturation is 84.2, PA saturation 64.1     Cardiac output is 6.4, cardiac index is 3.0, SVR is 1076.     Intervention:  Lesion 1: Proximal LAD, 50% stenosis-distal borderline lesion therefore IFR was performed.  A JL 3.5 catheter was advanced over wire and used to select engage the left main.  An Omni wire was advanced into the vessel where it was equalized after this it was advanced distal to the lesion.  iFR was 0.94 which is nonsignificant.  Final angiography revealed no vessel compromise.  Heparin was administered prior to the case.     Monitored sedation administered by the cath lab RN, and supervised throughout the procedure by myself. Total time 39 minutes.      Closure: TR band     No immediate complications.    A/P:  Nonobstructive coronary artery disease with  an IFR negative LAD  Mildly elevated left and right sided filling pressures within normal cardiac output  Cardiomyopathy likely nonischemic  Plan for 1 more day of IV diuresis after which we will transition to p.o.  If access site looks good can restart her Eliquis tomorrow morning after which she will get a ALEXIS cardioversion  Return to floor for further monitoring.     Osvaldo Mcgill MD  Interventional Cardiology        MEDICATIONS ADMINISTERED IN LAST 1 DAY:  atorvastatin (Lipitor) tab 10 mg       Date Action Dose Route User    7/14/2025 2042 Given 10 mg Oral Becca Corbett RN          heparin (Porcine) 82327 units/250mL infusion ACS/AFIB CONTINUOUS       Date Action Dose Route User    7/15/2025 0737 New Bag 1,200 Units/hr Intravenous Alana Juarez RN    7/15/2025 0102 New Bag 1,200 Units/hr Intravenous Alana Juarez RN          iopamidol 76% (ISOVUE-370) injection for power injector       Date Action Dose Route User    7/15/2025 1226 Given 65 mL Injection Osvaldo Mcgill MD          metoprolol succinate ER (Toprol XL) 24 hr tab 50 mg       Date Action Dose Route User    7/14/2025 2042 Given 50 mg Oral Becca Corbett RN                          furosemide (Lasix) 10 mg/mL injection 40 mg  Dose: 40 mg  Freq: 2 times daily (diuretic) Route: IV  Start: 07/13/25 1030 End: 07/15/25 1534           1103 AH-Given     1710 AH-Given      1012 KT-Given     1712 KT-Given      (0900 IM)-Not Given [C]     1534-D/C'd      furosemide (Lasix) 10 mg/mL injection 40 mg  Dose: 40 mg  Freq: Once Route: IV  Start: 07/12/25 1628 End: 07/12/25 1727          1727 BR-Given                               potassium chloride (Klor-Con M20) tab 40 mEq  Dose: 40 mEq  Freq: Once Route: OR  Start: 07/14/25 1145 End: 07/14/25 1155   Admin Instructions:   Do not crush            1155 KT-Given         potassium chloride (Klor-Con M20) tab 40 mEq  Dose: 40 mEq  Freq: Every 4 hours Route: OR  Start: 07/12/25 1815 End: 07/12/25 2130   Admin  Instructions:   Do not crush          1834 CM-Given     2130 JN-Given                 Vitals (last day)       Date/Time Temp Pulse Resp BP SpO2 Weight O2 Device O2 Flow Rate (L/min) Who    07/15/25 0802 98.1 °F (36.7 °C) 81 18 143/87 96 % -- None (Room air) 0 L/min SZ    07/15/25 0450 98 °F (36.7 °C) 86 18 142/74 96 % 253 lb 1.4 oz (114.8 kg) -- --     07/15/25 0300 -- 97 -- -- -- -- -- --     07/14/25 2258 97.9 °F (36.6 °C) 91 18 125/75 94 % -- -- --     07/14/25 2043 98.2 °F (36.8 °C) 88 18 126/90 93 % -- None (Room air) -- SO    07/14/25 1834 -- 113 -- -- 90 % -- -- --     07/14/25 1654 98.2 °F (36.8 °C) 93 18 150/74 95 % -- None (Room air) 0 L/min     07/14/25 1145 97.5 °F (36.4 °C) 100 16 113/63 92 % -- None (Room air) 0 L/min     07/14/25 0836 97.7 °F (36.5 °C) 91 16 149/90 93 % -- None (Room air) 0 L/min     07/14/25 0500 97.9 °F (36.6 °C) 83 20 144/84 92 % 252 lb 6.8 oz (114.5 kg) CPAP -- AB

## 2025-07-15 NOTE — PROGRESS NOTES
[facility]   part of formerly [facility]     Hospitalist Progress Note     Linda Hernandez Patient Status:  Inpatient    1945 MRN [id number]   Location [facility] 2NE-A Attending Pauly Sanches,    Hosp Day # 3 PCP Kofi Blandon MD     Chief Complaint: Dyspnea    Subjective:     Breathing feels basically normal      Objective:    Review of Systems:   A comprehensive review of systems was completed; pertinent positive and negatives stated in subjective.    Vital signs:  Temp:  [97.5 °F (36.4 °C)-98.2 °F (36.8 °C)] 98.1 °F (36.7 °C)  Pulse:  [] 81  Resp:  [16-18] 18  BP: (113-150)/(63-90) 143/87  SpO2:  [90 %-96 %] 96 %    Physical Exam:    General: No acute distress  Respiratory: No wheezes, CTAB   Cardiovascular: S1, S2, IRIR  Abdomen: Soft, Non-tender, non-distended, positive bowel sounds  Neuro: No new focal deficits.   Extremities: no LE edema       Diagnostic Data:    Labs:  Recent Labs   Lab 25  1533 25  0530 25  1621 07/15/25  0709   WBC 7.6 5.9 8.5 6.9   HGB 14.8 14.7 15.8 15.9   MCV 90.0 88.7 90.4 91.0   .0 258.0 283.0 255.0  255.0   INR 1.18  --   --   --        Recent Labs   Lab 25  1533 25  0530 25  0950 07/15/25  0710   * 122* 116* 114*   BUN 20 25* 29* 28*   CREATSERUM 1.10* 0.96 0.91 1.04*   CA 10.3 9.6 10.0 9.9   ALB 4.5  --   --   --     143 142 142   K 3.6 4.2  4.2 3.8 3.9  3.9    105 103 105   CO2 26.0 30.0 26.0 30.0   ALKPHO 96  --   --   --    AST 41*  --   --   --    ALT 49  --   --   --    BILT 1.0  --   --   --    TP 7.7  --   --   --        Estimated Glomerular Filtration Rate: 55 mL/min/1.73m2 (A) (result from lab).    Recent Labs   Lab 25  1533   TROPHS 9       Recent Labs   Lab 25  1533   PTP 15.2*   INR 1.18                  Microbiology    No results found for this visit on 25.      Imaging: Reviewed in Epic.    Medications:    sodium chloride   Intravenous On Call    benzocaine   1 spray Mouth/Throat See Admin Instructions    spironolactone  25 mg Oral Daily    furosemide  40 mg Intravenous BID (Diuretic)    [Held by provider] apixaban  5 mg Oral BID    atorvastatin  10 mg Oral Nightly    metoprolol succinate ER  50 mg Oral Nightly       Assessment & Plan:      #Acute HFrEF  -ECHO reviewed, EF 30-35% with Hypokinesis of the inferolateral, inferior and inferoseptal walls   -IV diuretics and GDMT per cardiology on consult  -[facility] today     #Atrial fibrillation  -Metoprolol  -Eliquis held for [facility], Heparin gtt  -DCCV planned     #HLD  -Statin     #Morbid Obesity  -Body mass index is 45.32 kg/m²    POC:  Scheduled for [facility] but did not sign consent form as had some questions/concerns about terminology, I reviewed and clarified with her and she is now willing and able to sign.       Pauly Sanches,     Supplementary Documentation:     Quality:  DVT Mechanical Prophylaxis:     Early ambuation  DVT Pharmacologic Prophylaxis   Medication    heparin (Porcine) 67589 units/250mL infusion ACS/AFIB CONTINUOUS    [Held by provider] apixaban (Eliquis) tab 5 mg                Code Status: Full Code  Monsivais: No urinary catheter in place  Monsivais Duration (in days):   Central line:    NESTOR: 7/16/2025        The 21st Century Cures Act makes medical notes like these available to patients in the interest of transparency. Please be advised this is a medical document. Medical documents are intended to carry relevant information, facts as evident, and the clinical opinion of the practitioner. The medical note is intended as peer to peer communication and may appear blunt or direct. It is written in medical language and may contain abbreviations or verbiage that are unfamiliar.              **Certification      PHYSICIAN Certification of Need for Inpatient Hospitalization - Initial Certification    Patient will require inpatient services that will reasonably be expected to span two midnight's based on the clinical  documentation in H+P.   Based on patients current state of illness, I anticipate that, after discharge, patient will require TBD.

## 2025-07-15 NOTE — PLAN OF CARE
Entresto and Jardiance both ~$47 a month. Will start entresto tonight and jardiance in am.   Resume Eliquis in am.   One more dose of IV lasix tonight, will evaluate in am if orals needed.

## 2025-07-15 NOTE — PROCEDURES
[facility]– [place]   part of [facility]    Cardiac Catheterization Note    Primary Proceduralist: Osvaldo Mcgill MD  Procedure Performed: LHC, RHC, and COR, IFR of proximal LAD  Date of Procedure: 7/15/2025   Indication: New cardiomyopathy  Pre Operative Diagnosis: New cardiomyopathy with HFrEF, atrial fibrillation, chest pain  Post Operative Diagnosis: Nonobstructive coronary disease, nonischemic cardiomyopathy  Estimated blood loss: Less than 5  Specimens: None    Consent:   Informed written consent was obtained from the patient after risk benefits and alternative explained the patient.  Patient agreed to proceed    Sedation  IV conscious sedation was achieved with Versed and fentanyl.  It was administered under my direct supervision.  Hemodynamic monitoring took place throughout the entire procedure by myself in the Cath Lab staff.  4 mg of Versed and 50 mcg of Fentanyl were given.  Start Time: 1145 end Time: 1224      Description of the procedure: After written informed consent was obtained from the patient, patient was brought to the cardiac catheterization laboratory in a stable condition and fasting state.  Patient was prepped and draped in the usual sterile fashion.  IV conscious sedation was achieved with Versed and fentanyl.  Lidocaine 1% was used to infiltrate the right radial artery for local anesthesia and a 6 Malian introducer sheath was inserted into the right radial artery.  Ultrasound-guided access was obtained for the basilic vein and a 5 Malian sheath was placed 5 Malian Wanchese was advanced to the PA and hemodynamics were obtained.  A TIG catheter was then advanced over wire through the radial access site and was used to engage the left and right coronary systems perform diagnostic angiography.  A JR4 catheter was advanced in the LV to perform hemodynamic assessment.  There was a moderate lesion in the proximal LAD for which IFR was performed see results below      Coronary  Angiogram Findings:  LM: Large caliber artery giving rise to LAD & LCX.  No significant disease  LAD: Large caliber artery giving rise to diagonal and septal branches.  Proximally there was a 50% stenosis, otherwise this was a tortuous vessel giving off diagonals which were free of obstructive disease  LCX: Large caliber artery giving rise to OM branches.  Tortuous vessel free of obstructive disease  RCA: Large caliber artery giving rise to acute marginal branches, and bifurcates into RPDA & RPL.  There is a 30 to 40% lesion in the mid RCA otherwise there are luminal irregularities.    Hemodynamics:  LVEDP 13 mmHg  No significant gradient upon Ao-LV pullback    RHC:   RA: 7  RV: 30/7/10  PA: 27/15/20  Wedge: 21    AO saturation is 84.2, PA saturation 64.1    Cardiac output is 6.4, cardiac index is 3.0, SVR is 1076.    Intervention:  Lesion 1: Proximal LAD, 50% stenosis-distal borderline lesion therefore IFR was performed.  A JL 3.5 catheter was advanced over wire and used to select engage the left main.  An Omni wire was advanced into the vessel where it was equalized after this it was advanced distal to the lesion.  iFR was 0.94 which is nonsignificant.  Final angiography revealed no vessel compromise.  Heparin was administered prior to the case.    Monitored sedation administered by the cath lab RN, and supervised throughout the procedure by myself. Total time 39 minutes.     Closure: TR band    No immediate complications.    A/P:  Nonobstructive coronary artery disease with an IFR negative LAD  Mildly elevated left and right sided filling pressures within normal cardiac output  Cardiomyopathy likely nonischemic  Plan for 1 more day of IV diuresis after which we will transition to p.o.  If access site looks good can restart her Eliquis tomorrow morning after which she will get a ALEXIS cardioversion  Return to floor for further monitoring.    Osvaldo Mcgill MD  Interventional Cardiology  [facility]  [place]

## 2025-07-15 NOTE — PLAN OF CARE
Assumed care for this patient at 2315. Patient alert and oriented x4. Up independently. Afib on tele, rates controlled. Maintaining o2 sats >90% on RA. Continent of bowel/bladder. Heparin gtt. NPO at 12am. Reviewed POC with patient, verbalized understanding. Plan is for R/LHC and possible ALEXIS/DCCV. Scheduled cath tentatively at 11am.     Problem: SAFETY ADULT - FALL  Goal: Free from fall injury  Description: INTERVENTIONS:  - Assess pt frequently for physical needs  - Identify cognitive and physical deficits and behaviors that affect risk of falls.  - Wolf fall precautions as indicated by assessment.  - Educate pt/family on patient safety including physical limitations  - Instruct pt to call for assistance with activity based on assessment  - Modify environment to reduce risk of injury  - Provide assistive devices as appropriate  - Consider OT/PT consult to assist with strengthening/mobility  - Encourage toileting schedule  Outcome: Progressing     Problem: CARDIOVASCULAR - ADULT  Goal: Maintains optimal cardiac output and hemodynamic stability  Description: INTERVENTIONS:  - Monitor vital signs, rhythm, and trends  - Monitor for bleeding, hypotension and signs of decreased cardiac output  - Evaluate effectiveness of vasoactive medications to optimize hemodynamic stability  - Monitor arterial and/or venous puncture sites for bleeding and/or hematoma  - Assess quality of pulses, skin color and temperature  - Assess for signs of decreased coronary artery perfusion - ex. Angina  - Evaluate fluid balance, assess for edema, trend weights  Outcome: Progressing  Goal: Absence of cardiac arrhythmias or at baseline  Description: INTERVENTIONS:  - Continuous cardiac monitoring, monitor vital signs, obtain 12 lead EKG if indicated  - Evaluate effectiveness of antiarrhythmic and heart rate control medications as ordered  - Initiate emergency measures for life threatening arrhythmias  - Monitor electrolytes and administer  replacement therapy as ordered  Outcome: Progressing     Problem: Patient/Family Goals  Goal: Patient/Family Long Term Goal  Description: Patient's Long Term Goal:  to go home     Interventions:  - Telemetry monitoring     Cardiololgy consult  - See additional Care Plan goals for specific interventions  Outcome: Progressing  Goal: Patient/Family Short Term Goal  Description: Patient's Short Term Goal:  to feel better    Interventions:   -    Medication adjustment  - See additional Care Plan goals for specific interventions  Outcome: Progressing

## 2025-07-15 NOTE — PLAN OF CARE
Rec in bed pt and VS appear stable denies CP/SOB. Sats in 90's on RA. TELE:AFIB.   Pt declined to sign R/LHC consents only wants the doctor to speak with her. Pt teaching regarding post procedure recovery. Verbalized understanding. Heparin gtt infusing per protocol. Will cont to monitor PRN.    Rec via cart from cath pt and VS appear stable Right TR Band in place with approximal 11 ml air per cath lab rn. Positive CMS in right arm. Denies pain. Sats maintained in 90's on RA. Slightly drowsy able to receive post procedure instruction. POC reviewed with spouse and dtr at bedside. B/P on right leg due to bilat upper extremity limb restrictions pt stated that B/P in leg were very painful unable to tolerate for extended periods. Monitoring per protocol.    1505: total of 4 ml air removed by charge RN patient appeared stable throughout recovery monitored per protocol. charge RN removed TR band per protocol after all air removed per protocol. Ying RN reports pt right wrist and radial artery appeared stable. Band-Aid applied no bleed no hematoma noted. CMS intact.     1715 this RN notified urgently that pt was bleeding from right wrist while up to bathroom. Pt was attempting to use bathroom she stated \"I saw a little bead of blood on band-aid at first. I didn't say anything then I continued in bathroom and all of a sudden there was blood everywhere on my arm and leg.This recorder and another RN stabilized patients right radial artery with manual pressure applied for over 10 minutes. Slight ooze and bruising noted TR band re-applied. Charge RN aware. Patient tolerated well. Site appears stable. Post procedure protocol restarted pt in bed resting with arm on pillow eating dinner with  at the bedside. Denies CP VS stable alert and participating in her care. Monitor per protocol. Spouse at bedside.    1930-In room round with 7p RN right radial and TR Band assessed approx 5 ml air removed travis well. VSS no hematoma/bleed  noted.    2000-During a second in room round with 7p RN, remaining air removed from right radial TR Band. 7p RN to assume care and monitoring. Site looks bruised buy CMS intact pulse palpable. Denies pain. Pt in bed alert but tired and ready for bed.appeared stable at handoff.    Pt to have ALEXIS/DCCV 7/16 NPO after midnight. Endorsed consents to 7p RN    Heparin stopped per [facility] APRN. Holding orders to resume Eliquis start 7/16

## 2025-07-16 ENCOUNTER — APPOINTMENT (OUTPATIENT)
Dept: INTERVENTIONAL RADIOLOGY/VASCULAR | Facility: HOSPITAL | Age: 80
End: 2025-07-16
Attending: INTERNAL MEDICINE
Payer: MEDICARE

## 2025-07-16 ENCOUNTER — APPOINTMENT (OUTPATIENT)
Dept: CV DIAGNOSTICS | Facility: HOSPITAL | Age: 80
DRG: 286 | End: 2025-07-16
Attending: INTERNAL MEDICINE
Payer: MEDICARE

## 2025-07-16 ENCOUNTER — APPOINTMENT (OUTPATIENT)
Dept: CV DIAGNOSTICS | Facility: HOSPITAL | Age: 80
End: 2025-07-16
Attending: INTERNAL MEDICINE
Payer: MEDICARE

## 2025-07-16 ENCOUNTER — APPOINTMENT (OUTPATIENT)
Dept: INTERVENTIONAL RADIOLOGY/VASCULAR | Facility: HOSPITAL | Age: 80
DRG: 286 | End: 2025-07-16
Attending: INTERNAL MEDICINE
Payer: MEDICARE

## 2025-07-16 PROBLEM — I50.21 ACUTE HFREF (HEART FAILURE WITH REDUCED EJECTION FRACTION) (HCC): Status: ACTIVE | Noted: 2025-07-16

## 2025-07-16 LAB
ATRIAL RATE: 69 BPM
NT-PROBNP SERPL-MCNC: 883 PG/ML (ref ?–450)
P AXIS: 48 DEGREES
P-R INTERVAL: 290 MS
Q-T INTERVAL: 502 MS
QRS DURATION: 178 MS
QTC CALCULATION (BEZET): 537 MS
R AXIS: -51 DEGREES
T AXIS: 111 DEGREES
VENTRICULAR RATE: 69 BPM

## 2025-07-16 PROCEDURE — 99239 HOSP IP/OBS DSCHRG MGMT >30: CPT | Performed by: STUDENT IN AN ORGANIZED HEALTH CARE EDUCATION/TRAINING PROGRAM

## 2025-07-16 PROCEDURE — 5A2204Z RESTORATION OF CARDIAC RHYTHM, SINGLE: ICD-10-PCS | Performed by: STUDENT IN AN ORGANIZED HEALTH CARE EDUCATION/TRAINING PROGRAM

## 2025-07-16 PROCEDURE — 93320 DOPPLER ECHO COMPLETE: CPT | Performed by: INTERNAL MEDICINE

## 2025-07-16 PROCEDURE — B24BZZ4 ULTRASONOGRAPHY OF HEART WITH AORTA, TRANSESOPHAGEAL: ICD-10-PCS | Performed by: STUDENT IN AN ORGANIZED HEALTH CARE EDUCATION/TRAINING PROGRAM

## 2025-07-16 PROCEDURE — 93325 DOPPLER ECHO COLOR FLOW MAPG: CPT | Performed by: INTERNAL MEDICINE

## 2025-07-16 RX ORDER — MIDAZOLAM HYDROCHLORIDE 1 MG/ML
INJECTION INTRAMUSCULAR; INTRAVENOUS
Status: COMPLETED
Start: 2025-07-16 | End: 2025-07-16

## 2025-07-16 RX ORDER — SPIRONOLACTONE 25 MG/1
25 TABLET ORAL DAILY
Qty: 30 TABLET | Refills: 3 | Status: SHIPPED | OUTPATIENT
Start: 2025-07-17

## 2025-07-16 RX ORDER — ATORVASTATIN CALCIUM 40 MG/1
40 TABLET, FILM COATED ORAL NIGHTLY
Status: DISCONTINUED | OUTPATIENT
Start: 2025-07-16 | End: 2025-07-16

## 2025-07-16 RX ORDER — TORSEMIDE 20 MG/1
20 TABLET ORAL DAILY
Qty: 30 TABLET | Refills: 1 | Status: SHIPPED | OUTPATIENT
Start: 2025-07-16

## 2025-07-16 RX ORDER — ATORVASTATIN CALCIUM 40 MG/1
40 TABLET, FILM COATED ORAL NIGHTLY
Status: CANCELLED | OUTPATIENT
Start: 2025-07-16

## 2025-07-16 RX ORDER — ATORVASTATIN CALCIUM 40 MG/1
40 TABLET, FILM COATED ORAL NIGHTLY
Qty: 30 TABLET | Refills: 3 | Status: SHIPPED | OUTPATIENT
Start: 2025-07-16

## 2025-07-16 RX ORDER — METHOHEXITAL IN WATER/PF 100MG/10ML
SYRINGE (ML) INTRAVENOUS
Status: COMPLETED
Start: 2025-07-16 | End: 2025-07-16

## 2025-07-16 NOTE — PROGRESS NOTES
Pt post ALEXIS/CV.     After adequate sedation per Dr. Peterson, ALEXIS/CV performed. Vss. Pt tolerated well.    Pt awake. Vss. Tolerating po intake.    Report called to Christina. Pt transferred back to floor, 0664.

## 2025-07-16 NOTE — PLAN OF CARE
Assumed care of patient at 0730.  Alert and oriented x4.  On room air with adequate O2 saturations.  Afib on tele. Rates controlled. R radial site bruised, but soft with no hematoma.   Continent of bowel and bladder.  Up SBA.   Pt and family updated on plan of care, verbalized understanding.     Addendum:  Back from [facility] at approx. 1400. SB with first degree AVB, no complaints of chest pain. EKG done in recovery already.     Problem: SAFETY ADULT - FALL  Goal: Free from fall injury  Description: INTERVENTIONS:  - Assess pt frequently for physical needs  - Identify cognitive and physical deficits and behaviors that affect risk of falls.  - Wiggins fall precautions as indicated by assessment.  - Educate pt/family on patient safety including physical limitations  - Instruct pt to call for assistance with activity based on assessment  - Modify environment to reduce risk of injury  - Provide assistive devices as appropriate  - Consider OT/PT consult to assist with strengthening/mobility  - Encourage toileting schedule  Outcome: Progressing     Problem: CARDIOVASCULAR - ADULT  Goal: Maintains optimal cardiac output and hemodynamic stability  Description: INTERVENTIONS:  - Monitor vital signs, rhythm, and trends  - Monitor for bleeding, hypotension and signs of decreased cardiac output  - Evaluate effectiveness of vasoactive medications to optimize hemodynamic stability  - Monitor arterial and/or venous puncture sites for bleeding and/or hematoma  - Assess quality of pulses, skin color and temperature  - Assess for signs of decreased coronary artery perfusion - ex. Angina  - Evaluate fluid balance, assess for edema, trend weights  Outcome: Progressing  Goal: Absence of cardiac arrhythmias or at baseline  Description: INTERVENTIONS:  - Continuous cardiac monitoring, monitor vital signs, obtain 12 lead EKG if indicated  - Evaluate effectiveness of antiarrhythmic and heart rate control medications as ordered  - Initiate  emergency measures for life threatening arrhythmias  - Monitor electrolytes and administer replacement therapy as ordered  Outcome: Progressing     Problem: RESPIRATORY - ADULT  Goal: Achieves optimal ventilation and oxygenation  Description: INTERVENTIONS:  - Assess for changes in respiratory status  - Assess for changes in mentation and behavior  - Position to facilitate oxygenation and minimize respiratory effort  - Oxygen supplementation based on oxygen saturation or ABGs  - Provide Smoking Cessation handout, if applicable  - Encourage broncho-pulmonary hygiene including cough, deep breathe, Incentive Spirometry  - Assess the need for suctioning and perform as needed  - Assess and instruct to report SOB or any respiratory difficulty  - Respiratory Therapy support as indicated  - Manage/alleviate anxiety  - Monitor for signs/symptoms of CO2 retention  Outcome: Progressing     Problem: METABOLIC/FLUID AND ELECTROLYTES - ADULT  Goal: Electrolytes maintained within normal limits  Description: INTERVENTIONS:  - Monitor labs and rhythm and assess patient for signs and symptoms of electrolyte imbalances  - Administer electrolyte replacement as ordered  - Monitor response to electrolyte replacements, including rhythm and repeat lab results as appropriate  - Fluid restriction as ordered  - Instruct patient on fluid and nutrition restrictions as appropriate  Outcome: Progressing     Problem: SKIN/TISSUE INTEGRITY - ADULT  Goal: Skin integrity remains intact  Description: INTERVENTIONS  - Assess and document risk factors for pressure ulcer development  - Assess and document skin integrity  - Monitor for areas of redness and/or skin breakdown  - Initiate interventions, skin care algorithm/standards of care as needed  Outcome: Progressing     Problem: Patient/Family Goals  Goal: Patient/Family Long Term Goal  Description: Patient's Long Term Goal:  to go home     Interventions:  - Telemetry monitoring     Cardiololgy  consult  - See additional Care Plan goals for specific interventions  Outcome: Progressing  Goal: Patient/Family Short Term Goal  Description: Patient's Short Term Goal:  to feel better    Interventions:   -    Medication adjustment  - See additional Care Plan goals for specific interventions  Outcome: Progressing

## 2025-07-16 NOTE — PROGRESS NOTES
[facility]   part of [facility]     Hospitalist Progress Note     Linda Hernandez Patient Status:  Inpatient    1945 MRN [id number]   Location [facility] 2NE-A Attending Pauly Sanches,    Hosp Day # 4 PCP Kofi Blandon MD     Chief Complaint: Dyspnea    Subjective:     Patient resting in bed and reports no issues    Objective:    Review of Systems:   A comprehensive review of systems was completed; pertinent positive and negatives stated in subjective.    Vital signs:  Temp:  [97.5 °F (36.4 °C)-97.7 °F (36.5 °C)] 97.5 °F (36.4 °C)  Pulse:  [71-98] 81  Resp:  [18] 18  BP: (132-161)/() 132/62  SpO2:  [91 %-95 %] 92 %    Physical Exam:    General: No acute distress  Respiratory: No wheezes, CTAB   Cardiovascular: S1, S2, IRIR  Abdomen: Soft, Non-tender, non-distended, positive bowel sounds  Neuro: No new focal deficits.   Extremities: no LE edema       Diagnostic Data:    Labs:  Recent Labs   Lab 25  1533 25  0530 25  1621 07/15/25  0709   WBC 7.6 5.9 8.5 6.9   HGB 14.8 14.7 15.8 15.9   MCV 90.0 88.7 90.4 91.0   .0 258.0 283.0 255.0  255.0   INR 1.18  --   --   --        Recent Labs   Lab 25  1533 25  0530 25  0950 07/15/25  0710   * 122* 116* 114*   BUN 20 25* 29* 28*   CREATSERUM 1.10* 0.96 0.91 1.04*   CA 10.3 9.6 10.0 9.9   ALB 4.5  --   --   --     143 142 142   K 3.6 4.2  4.2 3.8 3.9  3.9    105 103 105   CO2 26.0 30.0 26.0 30.0   ALKPHO 96  --   --   --    AST 41*  --   --   --    ALT 49  --   --   --    BILT 1.0  --   --   --    TP 7.7  --   --   --        Estimated Glomerular Filtration Rate: 55 mL/min/1.73m2 (A) (result from lab).    Recent Labs   Lab 25  1533   TROPHS 9       Recent Labs   Lab 25  1533   PTP 15.2*   INR 1.18                  Microbiology    No results found for this visit on 25.      Imaging: Reviewed in Epic.    Medications:    apixaban  5 mg Oral BID     sacubitril-valsartan  1 tablet Oral BID    empagliflozin  10 mg Oral Daily    spironolactone  25 mg Oral Daily    atorvastatin  10 mg Oral Nightly    metoprolol succinate ER  50 mg Oral Nightly       Assessment & Plan:      #Acute HFrEF  -ECHO reviewed, EF 30-35% with Hypokinesis of the inferolateral, inferior and inferoseptal walls   -IV diuretics and GDMT per cardiology on consult  -RHC/LHC on 7/15 shows nonobstructive CAD  -Cardiology following     #Atrial fibrillation  -Metoprolol  -Eliquis   -Plan for cardioversion today  -Cardiology following     #HLD  -Statin     #Morbid Obesity  -Body mass index is 45.32 kg/m²    José Peterson DO      Supplementary Documentation:     Quality:  DVT Mechanical Prophylaxis:     Early ambuation  DVT Pharmacologic Prophylaxis   Medication    apixaban (Eliquis) tab 5 mg                Code Status: Full Code  Monsivais: No urinary catheter in place  Monsivais Duration (in days):   Central line:    NESTOR: 7/16/2025        The 21st Century Cures Act makes medical notes like these available to patients in the interest of transparency. Please be advised this is a medical document. Medical documents are intended to carry relevant information, facts as evident, and the clinical opinion of the practitioner. The medical note is intended as peer to peer communication and may appear blunt or direct. It is written in medical language and may contain abbreviations or verbiage that are unfamiliar.              **Certification      PHYSICIAN Certification of Need for Inpatient Hospitalization - Initial Certification    Patient will require inpatient services that will reasonably be expected to span two midnight's based on the clinical documentation in H+P.   Based on patients current state of illness, I anticipate that, after discharge, patient will require TBD.

## 2025-07-16 NOTE — PROCEDURES
Cardiology Transesophageal Echo Note    PRE-PROCEDURE DIAGNOSIS: afib    PROCEDURE: Transesophageal Echocardiogram.    SEDATION:   Topical spray x 1  Versed: 3 mg  Fentanyl: 75 mcg  I personally supervised the intravenous administration of conscious sedation.  This was performed with continuous respiratory, hemodynamic, and electrocardiographic monitoring.  Sedation was supervised from [id number].     DESCRIPTION:   Informed consent was obtained after risks and benefits of the procedure were explained. Oral hurricaine spray was placed in the oropharynx. Conscious sedation was given.  After adequate anesthesia, the ALEXIS probe was placed in the oropharynx with the esophagus being successfully intubated. Standard transgastric and multiplane views were obtained and available findings are as follows:    COMPLICATIONS: none    FINDINGS:   Overall reduced LV systolic function with estimated LVEF 30-35% with regional variations regarding wall motion. Chamber sizes were within normal limits. Left  atrial enlargement noted with bowing of interatrial septum to the right.   Tricuspid valve appears normal in structure with trivial regurgitation.  Mitral valve appears normal in structure with mild regurgitation via multiple jets.  No significant pulmonic valve insufficiency.  There was a trileaflet aortic valve without stenosis or insufficiency.   Reduced velocities of 25 cm/s were seen in the ESTEFANIA. There was no evidence for intracardiac mass or thrombus in the ESTEFANIA.  Color Doppler flow interrogation of the intra-atrial septum was performed and there was no intracardiac shunting to suggest an ASD or PFO.  There was mild scattered atherosclerotic plaque in the visualized aorta without evidence for mobile atheromata or thrombus.  Trivial pericardial effusion.      Samuel Peterson DO  7/16/2025  1:17 PM

## 2025-07-16 NOTE — PROGRESS NOTES
NURSING DISCHARGE NOTE    Discharged Home via Wheelchair.  Accompanied by PCT.  Belongings Taken by patient/family.    Cleared for Dc by all providers. Iv and tele removed. Discharge instructions given to patient who verbalized all understanding.

## 2025-07-16 NOTE — DISCHARGE SUMMARY
[facility]     Linda Hernandez Patient Status:  Inpatient    1945 MRN [id number]   Location [facility] 2NE-A Attending No att. providers found   Hosp Day # 4 PCP Kofi Blandon MD     Date of Admission: 2025  Date of Discharge:  2025     Discharge Disposition: Home or Self Care    Discharge Diagnosis:  #Acute HFrEF  -ECHO reviewed, EF 30-35% with Hypokinesis of the inferolateral, inferior and inferoseptal walls   -IV diuretics and GDMT per cardiology on consult  -RHC/LHC on 7/15 shows nonobstructive CAD  -Cardiology following     #Atrial fibrillation  -Metoprolol  -Eliquis   -Plan for cardioversion today  -Cardiology following     #HLD  -Statin     #Morbid Obesity  -Body mass index is 45.32 kg/m²    History of Present Illness:   Linda Hernandez is a 80 year old female with past medical history of atrial fibrillation on Eliquis presents with shortness of breath.     Patient's endorsing a progressive worsening of shortness of breath.  She is having orthopnea and is having to sit up, she feels her lower extremities are swollen and she is gaining weight, she denies any chest discomfort.  No pain with deep breathing.  No fevers or chills.  No cough.       Brief Synopsis: Patient was started on IV diuretics. Echo was ordered. Cardiology was consulted. Echo showed EF 30-35%. RHC and LHC showed nonobstructive CAD. Patient underwent cardioversion for Afib. Patient was discharged home with outpatient follow up.    Lace+ Score: 44  59-90 High Risk  29-58 Medium Risk  0-28   Low Risk       TCM Follow-Up Recommendation:  LACE 29-58: Moderate Risk of readmission after discharge from the hospital.      Procedures during hospitalization:   RHC/LHC    Incidental or significant findings and recommendations (brief descriptions):  See brief synopsis above     Lab/Test results pending at Discharge:   None     Consultants:  Cardiology     Discharge Medication List:      Discharge Medications        START taking these medications        Instructions Prescription details   atorvastatin 40 MG Tabs  Commonly known as: Lipitor  Replaces: simvastatin 20 MG Tabs      Take 1 tablet (40 mg total) by mouth nightly.   Quantity: 30 tablet  Refills: 3     empagliflozin 10 MG Tabs  Commonly known as: Jardiance      Take 1 tablet (10 mg total) by mouth daily.   Quantity: 30 tablet  Refills: 3     Entresto 24-26 MG Tabs  Generic drug: sacubitril-valsartan      Take 1 tablet by mouth 2 (two) times daily.   Quantity: 60 tablet  Refills: 1     spironolactone 25 MG Tabs  Commonly known as: Aldactone      Take 1 tablet (25 mg total) by mouth daily.   Quantity: 30 tablet  Refills: 3            CONTINUE taking these medications        Instructions Prescription details   apixaban 5 MG Tabs  Commonly known as: Eliquis      Take 1 tablet (5 mg total) by mouth in the morning and 1 tablet (5 mg total) before bedtime.   Refills: 0     metoprolol succinate ER 50 MG Tb24  Commonly known as: Toprol XL      Take 1 tablet (50 mg total) by mouth in the morning.   Refills: 0     NON FORMULARY      Balance of nature fruits and veggies   Refills: 0     NON FORMULARY      in the morning. Vit d/K2 - david 5 drops.   Refills: 0     NON FORMULARY      Natural calm- magnesium supplement 1-2 tsp HS prn   Refills: 0     torsemide 20 MG Tabs  Commonly known as: Demadex      Take 1 tablet (20 mg total) by mouth daily.   Quantity: 30 tablet  Refills: 1            STOP taking these medications      azithromycin 250 MG Tabs  Commonly known as: Zithromax        simvastatin 20 MG Tabs  Commonly known as: Zocor  Replaced by: atorvastatin 40 MG Tabs                  Where to Get Your Medications        These medications were sent to [id number] - [place], [place] - [address] [phone], [phone]  [address], [facility] [address]      Phone: [phone]   atorvastatin 40 MG Tabs  empagliflozin 10 MG  Tabs  Entresto 24-26 MG Tabs  spironolactone 25 MG Tabs  torsemide 20 MG Tabs         [place]     Follow-up appointment:   Kofi Blandon MD  [address] [facility] [address]  [phone]    Schedule an appointment as soon as possible for a visit in 1 week(s)      Frommelt, Julie A, APRN  [address]  [address]  [facility] [address]  [phone]    Go on 7/30/2025  9:30 am    please keep your previously arranged appointment with Bridget on 7/30 at 9:30 am.      **please do not remove this appointment, contact HF  [phone] **    Appointments for Next 30 Days 7/17/2025 - 8/16/2025        Date Arrival Time Visit Type Length Department Provider     7/22/2025  9:30 AM  [facility] PT ORTHO EVAL [[id number]] 45 min [facility] in [place], PT    Patient Instructions:         Location Instructions:     Your appointment is scheduled at the [facility], [address] [place] [address], [facility].Take [address] to [address] (turn right, north) and then turn left on [facility].  Please arrive 10 minutes prior to your scheduled appointment time.  Masks are optional for all patients and visitors, unless otherwise indicated.               7/24/2025  9:20 AM  NON-TCM HOSPITAL FOLLOW UP [[id number]] 40 min [facility], [facility] Kofi Blandon MD    Patient Instructions:         Location Instructions:     Masks are optional for all patients and visitors, unless otherwise indicated. Note: A $50 fee will be charged for missed appointments or same-day cancellations. Please provide 24 hours' notice if you need to cancel or reschedule your appointment.               7/28/2025 11:15 AM  [facility] PT ORTHO TX [[id number]] 45 min [facility] in [place], AKA[facility]    Patient Instructions:         Location Instructions:     Your appointment is scheduled at the [address]  [facility].Take [address] to [address] (turn right, north) and then turn left on [facility].  Please arrive 10 minutes prior to your scheduled appointment time.  Masks are optional for all patients and visitors, unless otherwise indicated.               7/30/2025 12:45 PM  [facility] PT ORTHO TX [[id number]] 45 [place] [facility] [place], PT    Patient Instructions:         Location Instructions:     Your appointment is scheduled at the [facility], [address] [place] [address].Take [address] to [address] (turn right, north) and then turn left on [facility].  Please arrive 10 minutes prior to your scheduled appointment time.  Masks are optional for all patients and visitors, unless otherwise indicated.               8/4/2025  2:30 PM  [facility] PT ORTHO TX [[id number]] 45 min [facility] [place], PT    Patient Instructions:         Location Instructions:     Your appointment is scheduled at the [facility], [address] [place] [address].Take [address] to [address] (turn right, north) and then turn left on [facility].  Please arrive 10 minutes prior to your scheduled appointment time.  Masks are optional for all patients and visitors, unless otherwise indicated.               8/6/2025 10:30 AM  [facility] PT ORTHO TX [[id number]] 45 min [facility] [place], PT    Patient Instructions:         Location Instructions:     Your appointment is scheduled at the [facility], [address] [place] [address].Take [address] to [address] (turn right, north) and then turn left on [facility].  Please arrive 10 minutes prior to your scheduled appointment time.  Masks are optional for all patients and visitors, unless  otherwise indicated.                      Vital signs:       Physical Exam:    General: No acute distress   Lungs: clear to auscultation  Cardiovascular: S1, S2  Abdomen: Soft      -----------------------------------------------------------------------------------------------  PATIENT DISCHARGE INSTRUCTIONS: See electronic chart    José Peterson DO    Total time spent on discharge plannin minutes     The  Cent[employer] Cures Act makes medical notes like these available to patients in the interest of transparency. Please be advised this is a medical document. Medical documents are intended to carry relevant information, facts as evident, and the clinical opinion of the practitioner. The medical note is intended as peer to peer communication and may appear blunt or direct. It is written in medical language and may contain abbreviations or verbiage that are unfamiliar.

## 2025-07-16 NOTE — PROCEDURES
Cardiology Procedure Note    Linda Hernandez Location: Cath Lab   CSN [id number] MRN [id number]   Admission Date 7/12/2025  Operation Date 07/16/25    Attending Physician Samuel Peterson DO Operating Physician Samuel Peterson DO     Pre-Operative Diagnosis: Afib    Post-Operative Diagnosis: Same as above  PROCEDURE(S) PERFORMED:    1.    Cardioversion.  2.     Sedation      :  Samuel Peterson DO     ANESTHESIA:  IV sedation.  Moderate conscious sedation for this procedure was administered and personally monitored. Brevital 20 mg  Sedation time: 6818-2658       INDICATION:  Atrial Fibrillation     COMPLICATIONS:  None.     METHODS:  The patient was brought to the outpatient cardiac telemetry unit in a fasting and nonsedated state after providing informed consent.  IV sedation was administered during continuous ECG, pulse oximeter and noninvasive hemodynamic monitoring.  After administering IV Brevital in intermittent boluses, the desired level of sedation was achieved.      Cardioversion Energy:  360J    Pad Position: Anterior-Posterior  Pre- Cardioversion Rhythm- Afib, LBBB   Post Cardioversion Rhythm - NSR, first degree AV block, LBBB     CONCLUSIONS:  1.    Successful cardioversion       Samuel Peterson DO  Cardiologist  [facility]  7/16/2025  1:26 PM

## 2025-07-16 NOTE — PROGRESS NOTES
[facility] Cardiology Progress Note    Linda Hernandez Patient Status:  Inpatient    1945 MRN [id number]   Location [facility] 2NE-A Attending José Peterson, DO   Hosp Day # 4 PCP Kofi Blandon MD     Subjective:  Sob improved  No orthopnea  Denies chest pain     Objective:  /62 (BP Location: Right leg)   Pulse 81   Temp 97.5 °F (36.4 °C) (Oral)   Resp 18   Ht 5' 4\" (1.626 m)   Wt 251 lb 15.8 oz (114.3 kg)   LMP  (LMP Unknown)   SpO2 92%   BMI 43.25 kg/m²     Telemetry: afib      Intake/Output:    Intake/Output Summary (Last 24 hours) at 2025 0950  Last data filed at 2025 0816  Gross per 24 hour   Intake 0 ml   Output 1100 ml   Net -1100 ml       Last 3 Weights   25 0454 251 lb 15.8 oz (114.3 kg)   07/15/25 0450 253 lb 1.4 oz (114.8 kg)   25 0500 252 lb 6.8 oz (114.5 kg)   25 0350 254 lb 6.6 oz (115.4 kg)   25 1753 256 lb 6.3 oz (116.3 kg)   25 1526 264 lb (119.7 kg)   25 1003 251 lb 8 oz (114.1 kg)   25 1342 258 lb 11.2 oz (117.3 kg)       Labs:  Recent Labs   Lab 25  0530 25  0950 07/15/25  0710   * 116* 114*   BUN 25* 29* 28*   CREATSERUM 0.96 0.91 1.04*   EGFRCR 61 65 55*   CA 9.6 10.0 9.9    142 142   K 4.2  4.2 3.8 3.9  3.9    103 105   CO2 30.0 26.0 30.0     Recent Labs   Lab 25  1533 25  0530 25  1621 07/15/25  0709   RBC 4.82 4.78 5.09 5.13   HGB 14.8 14.7 15.8 15.9   HCT 43.4 42.4 46.0 46.7   MCV 90.0 88.7 90.4 91.0   MCH 30.7 30.8 31.0 31.0   MCHC 34.1 34.7 34.3 34.0   RDW 15.1 15.4 15.2 15.4   NEPRELIM 4.34 3.09  --   --    WBC 7.6 5.9 8.5 6.9   .0 258.0 283.0 255.0  255.0         Recent Labs   Lab 25  1533   TROPHS 9      Latest Reference Range & Units 25 15:33 25 07:02   pro-BETA NATRIURETIC PEPTIDE <450 pg/mL 3,081 (H) 883 (H)   (H): Data is abnormally high  Diagnostics:     RHC:   RA: 7  RV: 30/7/10  PA: 27/15/20  Wedge:  21        Physical Exam:    Physical Exam  Constitutional:       General: She is not in acute distress.     Appearance: She is obese.   Cardiovascular:      Rate and Rhythm: Normal rate. Rhythm irregular.      Comments: Right radial cath site open to air, no ecchymosis, swelling, or drainage, palpable radial pulse    Pulmonary:      Effort: Pulmonary effort is normal.   Abdominal:      Palpations: Abdomen is soft.   Musculoskeletal:      Right lower leg: No edema.      Left lower leg: No edema.   Neurological:      Mental Status: She is alert and oriented to person, place, and time.         Medications:  Scheduled Medications[1]  Medication Infusions[2]    Assessment:  81yo presented with progressive SOB, occasional left arm pain.      Acute HFrEF/NICM  EF 30-35% with hypokinetic inferolateral, inferior, and inferoseptal walls; mild MR   LHC with non-obstructive CAD  Etiolgy ? PVC with LBBB  GDMT: on metoprolol xl, h/o lisinopril cough, ? ARB cough???,   RHC 7/15 with wedge 21, RA 7, will continue oral loop diuretic, but may not need long term   GDMT: [x] ARNi/ACEi/ARB [x]BB [x]MRA  [x]SGLT2i  Dry weight likely < 250lbs, but compensated overall today      Paroxysmal Afib s/p ablation   Recurrent  Plan for JEVON/DCCV today  May need antiarythmic and CRT-P  Eliquis held for procedure yesterday, resumed this am  Chronic LBBB  Eventual CRT-P., possibly D   Frequent PVCs  CAD  50% pLAD, IFR negative, medical mgmt  Increase Lipitor to 40mg  Aspirin, metoprolol  HTN  Fair control  Adding entresto today  HL  MARY LOU- off cpap since nasal surgery, will need to consider restarting       Plan:    Jevon guided DCCV today, potential dc after  Will need both HF follow up with Julie Frommelt apn,  as well as EP follow up to discuss CRT-P vs D.      Nicole Thomas, JOSE  7/16/2025  9:50 AM  Ph [phone] ([place])  Ph [phone] ([place])      ____________________________  Pt s data reviewed and discussed with the  LEVI.    Tele: Af      I have personally performed the medical decision making in its entirety. My additions include:  Plan  - ALEXIS/DCCV potentially tomorrow after we begin OAC.  - Outpt eval for repeat ablation and consideration of CRT-D if EF fails to improve    R3    ____________________________  Collin Mcdowell MD, FACC, FHRS  Cardiac Electrophysiololgy  [facility] [place] [place]  7/16/2025               [1]    apixaban  5 mg Oral BID    sacubitril-valsartan  1 tablet Oral BID    empagliflozin  10 mg Oral Daily    spironolactone  25 mg Oral Daily    atorvastatin  10 mg Oral Nightly    metoprolol succinate ER  50 mg Oral Nightly   [2]

## 2025-07-17 ENCOUNTER — PATIENT OUTREACH (OUTPATIENT)
Dept: CASE MANAGEMENT | Age: 80
End: 2025-07-17

## 2025-07-17 NOTE — PROGRESS NOTES
Hospital follow up.    Kofi Blandon M.D.  [facility]  [address] [place] [address]  [phone]  Thursday 7/24 @9:20    Confirmed with patient's spouse.    Closing encounter.

## 2025-07-17 NOTE — PAYOR COMM NOTE
DISCHARGE REVIEW    Payor: [employer] PPO  Subscriber #:  [id number]  Authorization Number: [id number]    Admit date: 7/12/25  Admit time:   5:36 PM  Discharge Date: 7/16/2025  5:42 PM     Admitting Physician: Pauly Sanches DO  Attending Physician:  No att. providers found  Primary Care Physician: Kofi Blandon MD       Discharge Summary Notes    No notes of this type exist for this encounter.         REVIEWER COMMENTS

## 2025-07-22 ENCOUNTER — MED REC SCAN ONLY (OUTPATIENT)
Dept: FAMILY MEDICINE CLINIC | Facility: CLINIC | Age: 80
End: 2025-07-22

## 2025-07-22 ENCOUNTER — TELEPHONE (OUTPATIENT)
Dept: PHYSICAL THERAPY | Facility: HOSPITAL | Age: 80
End: 2025-07-22

## 2025-07-22 ENCOUNTER — OFFICE VISIT (OUTPATIENT)
Dept: PHYSICAL THERAPY | Age: 80
End: 2025-07-22
Attending: FAMILY MEDICINE
Payer: MEDICARE

## 2025-07-22 ENCOUNTER — TELEPHONE (OUTPATIENT)
Dept: FAMILY MEDICINE CLINIC | Facility: CLINIC | Age: 80
End: 2025-07-22

## 2025-07-22 DIAGNOSIS — S43.402S SPRAIN OF LEFT SHOULDER, UNSPECIFIED SHOULDER SPRAIN TYPE, SEQUELA: Primary | ICD-10-CM

## 2025-07-22 DIAGNOSIS — M62.830 SPASM OF LEFT TRAPEZIUS MUSCLE: ICD-10-CM
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
## 2025-07-22 PROCEDURE — 97161 PT EVAL LOW COMPLEX 20 MIN: CPT

## 2025-07-22 PROCEDURE — 97140 MANUAL THERAPY 1/> REGIONS: CPT

## 2025-07-22 PROCEDURE — 97110 THERAPEUTIC EXERCISES: CPT

## 2025-07-22 NOTE — TELEPHONE ENCOUNTER
Faxed received from [facility] [place] with results of TSH, Comprehensive Metabolic,CBC collection date of 7-11-25 ordered by Julie Frommelt, NP Dr Gleason carefully reviewed and documented:  Put in TSH, Glucose, gfr external results.      Results entered and copy to scan.

## 2025-07-22 NOTE — PROGRESS NOTES
UPPER EXTREMITY EVALUATION:     Diagnosis:   Sprain of left shoulder, unspecified shoulder sprain type, sequela (S43.402S)  Spasm of left trapezius muscle (M62.830)  Fall due to slipping on ice or snow, sequela (W00.9XXS) Patient:  Linda Hernandez (80 year old, female)        Referring Provider: Kofi Blandon  Today's Date   7/22/2025    Precautions:  Other (use comment); Cancer (heart condition, breast cancer ~9 years ago)   Date of Evaluation: 07/22/25  Next MD visit: No data recorded  Date of Surgery: No data recorded     PATIENT SUMMARY     Summary of chief complaints: L shoulder/arm pain  History of current condition: Patient states she had a-fib for 5 years, just got out of hospital for fluid overload. She had a cardioversion on Wed of last week. She is here for arm pain. Does not recall when arm started hurting. Seemed to start out of nowhere, lateral shoulder. Says it is hard to dress and bathe due to lack of motion. She did have a fall, thinks maybe in March when ground was slick. Pulled herself up w/ her L arm, but it was hurting before then. Symptoms fluctuating. Says pain sometimes travels down into hand, whole arm. Sometimes feels popping/clicking in shoulder. States at times she also has central sacral region pain.   Pain level: current 0 /10, at best 0 /10, at worst 7 /10  Description of symptoms: intermittent stabbing pain w/ more frequent dull ache. sometimes will have no pain.   Occupation: retired   Leisure activities/Hobbies:     Prior level of function: unrestricted  Current limitations: reaching, lifting, pulling, pushing, dressing, bathing  Pt goals: abolish pain and restore movement of LUE  Hand Dominance: right   Past medical history was reviewed with Linda.  Significant findings include: heart failure, afib, h/o breast cancer L  Imaging/Tests:     Linda  has a past medical history of Allergic rhinitis due to allergen (12/27/2017), Breast cancer (HCC) (1/6/16), Calculus of  kidney (2009), Cervix dysplasia, Essential hypertension, Exposure to medical diagnostic radiation, Hyperlipidemia, Left bundle branch block (11/20/2006), NAFLD (nonalcoholic fatty liver disease), Pneumonia due to organism (2014), Primary osteoarthritis of both hips (5/16/2017), Primary osteoarthritis of left knee (5/16/2017), Primary osteoarthritis of right knee (05/16/2017), Pulmonary embolism (HCC) (09/19/2003), and Visual impairment.  She  has a past surgical history that includes cataract (10/14/08); cataract (10/28/08); Fracture surgery (2/11/93); colonoscopy (2016); lumpectomy left (2/3/16); radiation left; other surgical history (11/19/08); other surgical history (3/13/09); other surgical history (1/20/10); other surgical history (12/11/12); other surgical history (2/6/15); other surgical history (1/5/16); other surgical history (08/2020); and Bedside Cardioversion (7/16/2025).    ASSESSMENT  Linda presents to physical therapy evaluation with primary c/o L shoulder/arm pain. The patient demonstrates good strength in L arm, but demonstrates reduced shoulder motion w/ capsular restriction.The results of the objective tests and measures show reduced L shoulder motion and strength. Functional deficits include but are not limited to reaching, lifting, pulling, pushing, dressing, bathing. Signs and symptoms are consistent with diagnosis of Sprain of left shoulder, unspecified shoulder sprain type, sequela (S43.402S)  Spasm of left trapezius muscle (M62.830)  Fall due to slipping on ice or snow, sequela (W00.9XXS). Pt and PT discussed evaluation findings, pathology, POC and HEP.  Pt voiced understanding and performs HEP correctly without reported pain. Skilled Physical Therapy is medically necessary to address the above impairments and reach functional goals.    OBJECTIVE:      Musculoskeletal  Observation/Posture: forward head posture; increased thoracic kyphosis; shoulder internal rotation  Palpation:      Accessory motion: reduced inferior and posterior glides L GHJ    Special Tests: +median nerve tension test     ROM and Strength   Cervical ROM  Flexion (active) 58   Extension (active) 40      L - Active R - Active   Lateral Flexion 23 27   Rotation 53 61     Shoulder ROM   L - Active R - Active   Flexion 85 148   Abduction 71 140   Horizontal Adduction 10 40   Internal Rotation @ 0 70 70   External Rotation @ 0 30 50   Internal Rotation @ 90-90 32 40   External Rotation @ 90-90 58 81   Functional IR (reach behind back) Back of hand on L glute max T8   Functional ER (reach behind head/neck) To occiuput w/ slight cervical flexion T1     Upper Extremity Strength   LEFT RIGHT   Shoulder Flexion 4 5   Shoulder ABD 4 5   Shoulder Extension 4- 5   Horizontal ABD - Middle Trapezius 4 5   Horizontal ABD - Lower Trapezius 4- 5   Shoulder IR @ neutral 4+ 5   Shoulder ER @ neutral 4 5   Shoulder IR @ 90-90 4+ 5   Shoulder ER @ 90-90 4- 5   Elbow Flexion 4+ 5   Elbow Extension 5 5   Supination 5 5   Pronation 5 5        Today's Treatment and Response:   Pt education was provided on exam findings, treatment diagnosis, treatment plan, expectations, and prognosis.    Today's Treatment       7/22/2025   UE Treatment   Manual Therapy -manual assisted stretching L shoulder flex/ABD/ER 10 min   Total Time Of Timed Procedures 0   Total Time Of Service-Based Procedures 0   Total Treatment Time 0   HEP Access Code: [email or web address]  URL: [email or web address]  Date: 07/22/2025  Prepared by: Smith Ren    Exercises  - Seated Shoulder Flexion Towel Slide at Table Top  - 4 x daily - 7 x weekly - 3 sets - 10 reps  - Seated Shoulder Abduction Towel Slide at Table Top  - 4 x daily - 7 x weekly - 3 sets - 10 reps  - Seated Shoulder External Rotation PROM on Table  - 4 x daily - 7 x weekly - 3 sets - 10 reps  - Standing shoulder flexion wall slides  - 4 x daily - 7 x weekly - 3 sets - 10 reps  - Sidelying Shoulder Abduction  - 2  x daily - 7 x weekly - 3 sets - 10 reps  - Sidelying Shoulder Horizontal Abduction  - 2 x daily - 7 x weekly - 3 sets - 10 reps  - Sidelying Shoulder External Rotation  - 2 x daily - 7 x weekly - 3 sets - 10 reps        Patient was instructed in and issued a HEP for: Access Code: [email or web address]  URL: [email or web address]  Date: 07/22/2025  Prepared by: Smith Ren    Exercises  - Seated Shoulder Flexion Towel Slide at Table Top  - 4 x daily - 7 x weekly - 3 sets - 10 reps  - Seated Shoulder Abduction Towel Slide at Table Top  - 4 x daily - 7 x weekly - 3 sets - 10 reps  - Seated Shoulder External Rotation PROM on Table  - 4 x daily - 7 x weekly - 3 sets - 10 reps  - Standing shoulder flexion wall slides  - 4 x daily - 7 x weekly - 3 sets - 10 reps  - Sidelying Shoulder Abduction  - 2 x daily - 7 x weekly - 3 sets - 10 reps  - Sidelying Shoulder Horizontal Abduction  - 2 x daily - 7 x weekly - 3 sets - 10 reps  - Sidelying Shoulder External Rotation  - 2 x daily - 7 x weekly - 3 sets - 10 reps    Charges:  PT EVAL:  ,  eval, 1TE, 1MT  In agreement with evaluation findings and clinical rationale, this evaluation involved LOW COMPLEXITY decision making due to no personal factors/comorbidities, 1-2 body structures involved/activity limitations, and stable symptoms as documented in the evaluation.                                                          PLAN OF CARE:      Goals: (to be met in 12 visits)   STG  -Patient will report 40% improvement in symptoms as evidence of subjective improvement in overall condition.  -Patient will increase L shoulder flexion and ABD AROM to 110 degrees to improve performance of activities involving reaching, lifting, grasping, and manipulating objects.  -Patient will increase L shoulder functional internal rotation AROM to reach fingertips to level of L3 to improve performance of activities involving reaching behind back or overhead to allow completion of dressing and/or  bathing tasks.  -Patient will increase L shoulder functional external rotation AROM to reach fingertips to level of C4 to improve performance of activities involving reaching behind back or overhead to allow completion of dressing and/or bathing tasks.    LTG  -Patient will report 60% improvement in symptoms as evidence of subjective improvement in overall condition.  -Patient will increase L shoulder flexion and ABD AROM to 140 degrees to improve performance of activities involving reaching, lifting, grasping, and manipulating objects.  -Patient will increase L shoulder 90-90 internal rotation AROM to 40 degrees to improve performance of activities involving reaching behind back or overhead to allow completion of dressing and/or bathing tasks.  -Patient will increase L shoulder 90-90 external rotation AROM to 85 degrees to improve performance of activities involving reaching behind back or overhead to allow completion of dressing and/or bathing tasks.  -Patient will increase L shoulder functional internal rotation AROM to reach fingertips to level of T12 to improve performance of activities involving reaching behind back or overhead to allow completion of dressing and/or bathing tasks.  -Patient will increase L shoulder functional external rotation AROM to reach fingertips to level of T1 to improve performance of activities involving reaching behind back or overhead to allow completion of dressing and/or bathing tasks.  -Patient will increase strength grade of LUE muscle groups to 5/5 to increase resilience to physical stressors and improve performance of activities such as picking up objects or reaching overhead.  -Patient will demonstrate ability to lift 5# overhead and place on shelf to allow completion of similar tasks during everyday life.  -Patient will achieve a score on the QuickDASH of 20% as evidence of improved functional capacity during everyday activity.  -Patient will demonstrate and/or verbalize  competence in performance of advanced HEP to be able to progress exercise on their own following discharge from physical therapy.   Frequency / Duration: Patient will be seen 1-2x/week or a total of 12  visits over a 90 day period. Treatment will include: Manual Therapy; Neuromuscular Re-education; Self-Care Home Management; Therapeutic Activities; Therapeutic Exercise; Home Exercise Program instruction; Patient/Family Education    Education or treatment limitation: None   Rehab Potential: good     QuickDASH Outcome Score  40.91    Patient/Family/Caregiver was advised of these findings, precautions, and treatment options and has agreed to actively participate in planning and for this course of care.    Thank you for your referral. Please co-sign or sign and return this letter via fax as soon as possible to [phone]. If you have any questions, please contact me at Dept: [phone]    Sincerely,  Electronically signed by therapist: Smith Ren PT  Physician's certification required: Yes  I certify the need for these services furnished under this plan of treatment and while under my care.    X___________________________________________________ Date____________________    Certification From: 7/22/2025  To: 10/20/2025

## 2025-07-24 ENCOUNTER — OFFICE VISIT (OUTPATIENT)
Dept: FAMILY MEDICINE CLINIC | Facility: CLINIC | Age: 80
End: 2025-07-24
Payer: MEDICARE

## 2025-07-24 VITALS
TEMPERATURE: 97 F | BODY MASS INDEX: 43.54 KG/M2 | RESPIRATION RATE: 16 BRPM | HEART RATE: 78 BPM | OXYGEN SATURATION: 97 % | WEIGHT: 255 LBS | DIASTOLIC BLOOD PRESSURE: 62 MMHG | SYSTOLIC BLOOD PRESSURE: 98 MMHG | HEIGHT: 64 IN

## 2025-07-24 DIAGNOSIS — I48.0 PAROXYSMAL ATRIAL FIBRILLATION (HCC): ICD-10-CM

## 2025-07-24 DIAGNOSIS — I50.21 ACUTE HFREF (HEART FAILURE WITH REDUCED EJECTION FRACTION) (HCC): Primary | ICD-10-CM

## 2025-07-24 DIAGNOSIS — I95.2 HYPOTENSION DUE TO DRUGS: ICD-10-CM

## 2025-07-24 PROBLEM — Z51.81 THERAPEUTIC DRUG MONITORING: Status: RESOLVED | Noted: 2025-07-15 | Resolved: 2025-07-24

## 2025-07-24 PROBLEM — R79.89 ELEVATED BRAIN NATRIURETIC PEPTIDE (BNP) LEVEL: Status: RESOLVED | Noted: 2025-07-12 | Resolved: 2025-07-24

## 2025-07-24 PROBLEM — R07.9 EXERTIONAL CHEST PAIN: Status: RESOLVED | Noted: 2025-07-12 | Resolved: 2025-07-24

## 2025-07-24 PROBLEM — R03.0 ELEVATED BLOOD PRESSURE READING: Status: RESOLVED | Noted: 2025-07-12 | Resolved: 2025-07-24

## 2025-07-24 PROBLEM — R06.09 DOE (DYSPNEA ON EXERTION): Status: RESOLVED | Noted: 2025-07-12 | Resolved: 2025-07-24

## 2025-07-24 PROCEDURE — 3008F BODY MASS INDEX DOCD: CPT | Performed by: FAMILY MEDICINE

## 2025-07-24 PROCEDURE — 1160F RVW MEDS BY RX/DR IN RCRD: CPT | Performed by: FAMILY MEDICINE

## 2025-07-24 PROCEDURE — 1159F MED LIST DOCD IN RCRD: CPT | Performed by: FAMILY MEDICINE

## 2025-07-24 PROCEDURE — G2211 COMPLEX E/M VISIT ADD ON: HCPCS | Performed by: FAMILY MEDICINE

## 2025-07-24 PROCEDURE — 3074F SYST BP LT 130 MM HG: CPT | Performed by: FAMILY MEDICINE

## 2025-07-24 PROCEDURE — 3078F DIAST BP <80 MM HG: CPT | Performed by: FAMILY MEDICINE

## 2025-07-24 PROCEDURE — 1111F DSCHRG MED/CURRENT MED MERGE: CPT | Performed by: FAMILY MEDICINE

## 2025-07-24 PROCEDURE — 99214 OFFICE O/P EST MOD 30 MIN: CPT | Performed by: FAMILY MEDICINE

## 2025-07-24 NOTE — PROGRESS NOTES
The following individual(s) verbally consented to be recorded using ambient AI listening technology and understand that they can each withdraw their consent to this listening technology at any point by asking the clinician to turn off or pause the recording:    Patient name: Linda Hernandez  Additional names:

## 2025-07-24 NOTE — PROGRESS NOTES
Subjective:   Linda Hernandez is a 80 year old female who presents for Hospital F/U (7/12-7/16/25 ED to Hospital admit for dyspnea on exertion/)       History/Other:   History of Present Illness  Linda Hernandez is a 80 year old female with heart failure and atrial fibrillation who presents with worsening shortness of breath and fatigue.    Since March, she has experienced worsening shortness of breath and fatigue, initially noticed while walking her grandchildren to school. These symptoms include significant breathlessness and heart pain. Despite a slight improvement after her metoprolol dosage was reduced, the symptoms have progressively worsened, making it difficult for her to walk down the horton or lie flat without becoming breathless.    On July 12, 2025, she was seen in the emergency room and had an echocardiogram, which she recalls showed her ejection fraction was lower than before. She had low blood pressure and underwent cardioversion for atrial fibrillation. Her current medications include Eliquis and Entresto, with adjustments made to her regimen, including a reduction in metoprolol. Her weight remains stable around 251-255 pounds. She uses a wedge to sleep due to difficulty breathing when lying flat, which has improved her symptoms.    She has a history of atrial fibrillation, which she does not typically feel, and was in atrial fibrillation during her hospital stay. She is concerned about the potential for recurrence of atrial fibrillation. She experiences fatigue, which she attributes to both her medical condition and the presence of visiting family members.    She participates in physical therapy for a suspected frozen shoulder, initially worried it might be related to her heart condition. She has been given exercises to help with this issue      RESULTS REVIEWED FROM PRIOR VISITS BY   DR KORTNEY Genao and progress and imaging  reviewed in care everywhere     Chief Complaint  Reviewed and Verified  Nursing Notes Reviewed and   Verified  Tobacco Reviewed  Allergies Reviewed  Medications Reviewed    Problem List Reviewed  Medical History Reviewed  Surgical History   Reviewed  Family History Reviewed  Social History Reviewed         Tobacco:  She has never smoked tobacco.    Current Medications[1]      Review of Systems:  Pertinent items are noted in HPI.      Objective:   BP 98/62   Pulse 78   Temp 97.1 °F (36.2 °C) (Temporal)   Resp 16   Ht 5' 4\" (1.626 m)   Wt 255 lb (115.7 kg)   LMP  (LMP Unknown)   SpO2 97%   BMI 43.77 kg/m²  Estimated body mass index is 43.77 kg/m² as calculated from the following:    Height as of this encounter: 5' 4\" (1.626 m).    Weight as of this encounter: 255 lb (115.7 kg).  Results  Echocardiogram: Ejection fraction 30-35% (07/16/2025)     Physical Exam  MEASUREMENTS: Weight- 255.  GENERAL: Alert, cooperative, well developed, no acute distress  CHEST: Clear to auscultation bilaterally, no wheezes, rhonchi, or crackles  CARDIOVASCULAR: Normal heart rate and rhythm, S1 and S2 normal without murmurs  ABDOMEN: Soft, non-tender, non-distended, without organomegaly, normal bowel sounds  EXTREMITIES: No cyanosis or edema  NEUROLOGICAL: Cranial nerves grossly intact, moves all extremities without gross motor or sensory deficit      Assessment & Plan:   1. Acute HFrEF (heart failure with reduced ejection fraction) (HCC) (Primary)  2. Hypotension due to drugs  3. Paroxysmal atrial fibrillation (HCC)  Overview:  On Eliquis and sees Dr Avalos regularly    Assessment & Plan  Heart Failure with Reduced Ejection Fraction (HFrEF)  Ejection fraction decreased to 30-35%. Significant dyspnea and orthopnea. Recent hospitalization for atrial fibrillation. Discussed pacemaker/defibrillator for arrhythmia risk. Explained device's role in arrhythmia prevention. Emphasized daily weight monitoring for fluid retention.  - Continue Entresto and Eliquis.  - Monitor weight  daily and report significant changes.  - Consider pacemaker/defibrillator if symptoms worsen or as advised by cardiology.  - Reduce morning medication of spironolactone dose by half to address hypotension. [25 mg reduce to 12.5 mg\]- Encourage hydration and monitor for fluid overload symptoms.    Long discussed with patient regarding DYSPNE ON EXERTION symptom and also reasi=on for daily weight check   and the significance of large weight fluctuations result from volume retention or depletion      Atrial Fibrillation  Post-cardioversion, currently in sinus rhythm. On Eliquis. Asymptomatic for AFib episodes. Advised use of personal ECG device for monitoring.  - Continue Eliquis.  - Use personal ECG device for heart rhythm monitoring.    Hypotension  Experiencing hypotension, possibly due to medication. Advised hydration to manage blood pressure.  - Reduce morning medication dose by half.  - Monitor blood pressure regularly and report persistent low readings or fatigue.    Frozen Shoulder  Managed with physical therapy. Exercises provided to improve mobility.  - Continue physical therapy exercises.  - Follow up with physical therapy appointments.        No follow-ups on file.        Kofi Blandon MD, 7/24/2025, 9:52 AM             [1]   Current Outpatient Medications   Medication Sig Dispense Refill    spironolactone 25 MG Oral Tab Take 1 tablet (25 mg total) by mouth daily. 30 tablet 3    atorvastatin 40 MG Oral Tab Take 1 tablet (40 mg total) by mouth nightly. 30 tablet 3    torsemide 20 MG Oral Tab Take 1 tablet (20 mg total) by mouth daily. 30 tablet 1    sacubitril-valsartan (ENTRESTO) 24-26 MG Oral Tab Take 1 tablet by mouth 2 (two) times daily. 60 tablet 1    empagliflozin (JARDIANCE) 10 MG Oral Tab Take 1 tablet (10 mg total) by mouth daily. 30 tablet 3    NON FORMULARY Balance of nature fruits and veggies      NON FORMULARY in the morning. Vit d/K2 - david 5 drops.      NON FORMULARY Natural calm- magnesium  supplement 1-2 tsp HS prn      metoprolol succinate ER 50 MG Oral Tablet 24 Hr Take 1 tablet (50 mg total) by mouth in the morning.      apixaban 5 MG Oral Tab Take 1 tablet (5 mg total) by mouth in the morning and 1 tablet (5 mg total) before bedtime.  0

## 2025-07-28 ENCOUNTER — APPOINTMENT (OUTPATIENT)
Dept: PHYSICAL THERAPY | Age: 80
End: 2025-07-28
Attending: FAMILY MEDICINE
Payer: MEDICARE

## 2025-07-30 ENCOUNTER — APPOINTMENT (OUTPATIENT)
Dept: PHYSICAL THERAPY | Age: 80
End: 2025-07-30
Attending: FAMILY MEDICINE
Payer: MEDICARE

## 2025-07-31 ENCOUNTER — TELEPHONE (OUTPATIENT)
Dept: PHYSICAL THERAPY | Facility: HOSPITAL | Age: 80
End: 2025-07-31

## 2025-07-31 VITALS
TEMPERATURE: 98 F | WEIGHT: 250.63 LBS | BODY MASS INDEX: 42.79 KG/M2 | HEART RATE: 89 BPM | SYSTOLIC BLOOD PRESSURE: 123 MMHG | RESPIRATION RATE: 18 BRPM | DIASTOLIC BLOOD PRESSURE: 54 MMHG | OXYGEN SATURATION: 99 % | HEIGHT: 64 IN

## 2025-08-04 ENCOUNTER — APPOINTMENT (OUTPATIENT)
Dept: PHYSICAL THERAPY | Age: 80
End: 2025-08-04
Attending: FAMILY MEDICINE

## 2025-08-06 ENCOUNTER — TELEPHONE (OUTPATIENT)
Dept: FAMILY MEDICINE CLINIC | Facility: CLINIC | Age: 80
End: 2025-08-06

## 2025-08-06 ENCOUNTER — OFFICE VISIT (OUTPATIENT)
Dept: PHYSICAL THERAPY | Age: 80
End: 2025-08-06
Attending: FAMILY MEDICINE

## 2025-08-06 PROCEDURE — 97112 NEUROMUSCULAR REEDUCATION: CPT

## 2025-08-06 PROCEDURE — 97110 THERAPEUTIC EXERCISES: CPT

## 2025-08-06 PROCEDURE — 97140 MANUAL THERAPY 1/> REGIONS: CPT

## 2025-08-12 ENCOUNTER — APPOINTMENT (OUTPATIENT)
Dept: PHYSICAL THERAPY | Age: 80
End: 2025-08-12
Attending: FAMILY MEDICINE

## 2025-08-14 ENCOUNTER — OFFICE VISIT (OUTPATIENT)
Dept: PHYSICAL THERAPY | Age: 80
End: 2025-08-14
Attending: FAMILY MEDICINE

## 2025-08-14 PROCEDURE — 97110 THERAPEUTIC EXERCISES: CPT

## 2025-08-14 PROCEDURE — 97140 MANUAL THERAPY 1/> REGIONS: CPT

## 2025-08-18 ENCOUNTER — APPOINTMENT (OUTPATIENT)
Dept: PHYSICAL THERAPY | Age: 80
End: 2025-08-18
Attending: FAMILY MEDICINE

## 2025-08-21 ENCOUNTER — OFFICE VISIT (OUTPATIENT)
Dept: PHYSICAL THERAPY | Age: 80
End: 2025-08-21
Attending: FAMILY MEDICINE

## 2025-08-21 PROCEDURE — 97112 NEUROMUSCULAR REEDUCATION: CPT

## 2025-08-21 PROCEDURE — 97140 MANUAL THERAPY 1/> REGIONS: CPT

## 2025-08-21 PROCEDURE — 97110 THERAPEUTIC EXERCISES: CPT

## 2025-08-26 ENCOUNTER — APPOINTMENT (OUTPATIENT)
Dept: PHYSICAL THERAPY | Age: 80
End: 2025-08-26
Attending: FAMILY MEDICINE

## 2025-08-26 ENCOUNTER — TELEPHONE (OUTPATIENT)
Dept: PHYSICAL THERAPY | Age: 80
End: 2025-08-26

## 2025-08-28 ENCOUNTER — APPOINTMENT (OUTPATIENT)
Dept: PHYSICAL THERAPY | Age: 80
End: 2025-08-28
Attending: FAMILY MEDICINE

## (undated) DEVICE — CHLORAPREP 26ML APPLICATOR

## (undated) DEVICE — BANDAGE ELASTIC ACE 6\" X-LONG

## (undated) DEVICE — DRAPE,U/SHT,SPLIT,FILM,60X84,STERILE: Brand: MEDLINE

## (undated) DEVICE — SUTURE VICRYL 2-0 CP-1

## (undated) DEVICE — STERILE POLYISOPRENE POWDER-FREE SURGICAL GLOVES: Brand: PROTEXIS

## (undated) DEVICE — 2T11 #2 PDO 36 X 36: Brand: 2T11 #2 PDO 36 X 36

## (undated) DEVICE — BIPOLAR SEALER 23-112-1 AQM 6.0: Brand: AQUAMANTYS™

## (undated) DEVICE — ABDOMINAL PAD: Brand: DERMACEA

## (undated) DEVICE — Device: Brand: STABLECUT®

## (undated) DEVICE — INSTRUMENT FEE

## (undated) DEVICE — UNIVERSAL STERIBUMP® STERILE (5/CASE): Brand: UNIVERSAL STERIBUMP®

## (undated) DEVICE — SUTURE VICRYL 1 OS-6

## (undated) DEVICE — SIGMA LCS HIGH PERFORMANCE STERILE THREADED HEADED PINS
Type: IMPLANTABLE DEVICE | Site: KNEE
Brand: SIGMA LCS HIGH PERFORMANCE

## (undated) DEVICE — WRAP COOLING KNEE W/ICE PILLOW

## (undated) DEVICE — HOOD, PEEL-AWAY: Brand: FLYTE

## (undated) DEVICE — GOWN SURG AERO CHROME XXL

## (undated) DEVICE — DRAIN RELIAVAC W/DRN MED 1/8

## (undated) DEVICE — SPECIMEN CONTAINER,POSITIVE SEAL INDICATOR, OR PACKAGED: Brand: PRECISION

## (undated) DEVICE — BOWL CEMENT MIX QUICK-VAC

## (undated) DEVICE — TOTAL KNEE CDS: Brand: MEDLINE INDUSTRIES, INC.

## (undated) DEVICE — CONVERTORS STOCKINETTE: Brand: CONVERTORS

## (undated) DEVICE — DRESSING AQUACEL AG 3.5X12

## (undated) DEVICE — ZIMMER® STERILE DISPOSABLE TOURNIQUET CUFF WITH PLC, DUAL PORT, SINGLE BLADDER, 34 IN. (86 CM)

## (undated) DEVICE — DECANTER BAG 9": Brand: MEDLINE INDUSTRIES, INC.

## (undated) DEVICE — KENDALL SCD EXPRESS SLEEVES, KNEE LENGTH, MEDIUM: Brand: KENDALL SCD

## (undated) DEVICE — SOL  .9 1000ML BTL

## (undated) DEVICE — SIGMA LCS HIGH PERFORMANCE INSTRUMENTS STERILE THREADED PINS
Type: IMPLANTABLE DEVICE | Site: KNEE
Brand: SIGMA LCS HIGH PERFORMANCE

## (undated) NOTE — LETTER
[address]  [address]  Consent for Procedure/Sedation  Date: 7/14/25         Time: 08:30    I hereby authorize                           , my physician and his/her assistants (if applicable), which may include medical students, residents, and/or fellows, to perform the following surgical operation/ procedure and administer such anesthesia as may be determined necessary by my physician: Cardioversion on Linda Hernandez  2.   I recognize that during the surgical operation/procedure, unforeseen conditions may necessitate additional or different procedures than those listed above.  I, therefore, further authorize and request that the above-named surgeon, assistants, or designees perform such procedures as are, in their judgment, necessary and desirable.    3.   My surgeon/physician has discussed prior to my surgery the potential benefits, risks and side effects of this procedure; the likelihood of achieving goals; and potential problems that might occur during recuperation.  They also discussed reasonable alternatives to the procedure, including risks, benefits, and side effects related to the alternatives and risks related to not receiving this procedure.  I have had all my questions answered and I acknowledge that no guarantee has been made as to the result that may be obtained.    4.   Should the need arise during my operation/procedure, which includes change of level of care prior to discharge, I also consent to the administration of blood and/or blood products.  Further, I understand that despite careful testing and screening of blood or blood products by collecting agencies, I may still be subject to ill effects as a result of receiving a blood transfusion and/or blood products.  The following are some, but not all, of the potential risks that can occur: fever and allergic reactions, hemolytic reactions, transmission of diseases such as Hepatitis, AIDS and  Cytomegalovirus (CMV) and fluid overload.  In the event that I wish to have an autologous transfusion of my own blood, or a directed donor transfusion, I will discuss this with my physician.   Check only if Refusing Blood or Blood Products  I understand refusal of blood or blood products as deemed necessary by my physician may have serious consequences to my condition to include possible death. I hereby assume responsibility for my refusal and release the hospital, its personnel, and my physicians from any responsibility for the consequences of my refusal.         o  Refuse         5.   I authorize the use of any specimen, organs, tissues, body parts or foreign objects that may be removed from my body during the operation/procedure for diagnosis, research or teaching purposes and their subsequent disposal by hospital authorities.  I also authorize the release of specimen test results and/or written reports to my treating physician on the hospital medical staff or other referring or consulting physicians involved in my care, at the discretion of the Pathologist or my treating physician.    6.   I consent to the photographing or videotaping of the operations or procedures to be performed, including appropriate portions of my body for medical, scientific, or educational purposes, provided my identity is not revealed by the pictures or by descriptive texts accompanying them.  If the procedure has been photographed/videotaped, the surgeon will obtain the original picture, image, videotape or CD.  The hospital will not be responsible for storage, release or maintenance of the picture, image, tape or CD.    7.   I consent to the presence of a  or observers in the operating room as deemed necessary by my physician or their designees.    8.   I recognize that in the event my procedure results in extended X-Ray/fluoroscopy time, I may develop a skin reaction.    9. If I have a Do Not Attempt Resuscitation  (DNAR) order in place, that status will be suspended while in the operating room, procedural suite, and during the recovery period unless otherwise explicitly stated by me (or a person authorized to consent on my behalf). The surgeon or my attending physician will determine when the applicable recovery period ends for purposes of reinstating the DNAR order.  10. Patients having a sterilization procedure: I understand that if the procedure is successful the results will be permanent and it will therefore be impossible for me to inseminate, conceive, or bear children.  I also understand that the procedure is intended to result in sterility, although the result has not been guaranteed.   11. I acknowledge that my physician has explained sedation/analgesia administration to me including the risk and benefits I consent to the administration of sedation/analgesia as may be necessary or desirable in the judgment of my physician.    I CERTIFY THAT I HAVE READ AND FULLY UNDERSTAND THE ABOVE CONSENT TO OPERATION and/or OTHER PROCEDURE.        ____________________________________       _________________________________      ______________________________  Signature of Patient         Signature of Responsible Person        Printed Name of Responsible Person        ____________________________________      _________________________________      ______________________________       Signature of Witness          Relationship to Patient                       Date                                       Time  Patient Name: Linda Hernandez  : 1945    Reviewed: 2024   Printed: 2025  Medical Record #: [id number] Page 1 of 1

## (undated) NOTE — ED AVS SNAPSHOT
[facility] in [address] [address]    Phone:  [phone]    Fax:  [phone]   MRN: [id number]    Department:  [facility] in [facility]   Date of Visit:  2/1/2017 Where to Get Your Medications      These medications were sent to [address], [address] [address]  [facility], [phone]  [address]  [address], [address] [place] [address]     Phone:  [phone]    - Albuterol Sulfate  (90 Base) Click [email or web address]      Or call [phone]    If you have any problems with your follow-up, please call our  at [phone].     Si usted tiene algun problema con ayala sequimiento, por favor llame a nuestro adminstrador de casos al (6 Pharmacy Address Phone Number   [address] [facility] ([address] [facility]) [address] [facility]. ([address]) [address]  ([address] [facility] [address] [address]) [phone] Narrative:    PROCEDURE:  XR CHEST PA + LAT CHEST (CPT=71020)     INDICATIONS:  coughing     COMPARISON:  None. TECHNIQUE:  PA and lateral chest radiographs were obtained.      PATIENT STATED HISTORY:  Patient states she has had a productive cough, hea

## (undated) NOTE — LETTER
[facility] [address], [address]  Consent for Procedure/Sedation    Date:        Time:       1. I authorize the performance upon [facility] the following:  Electrophysiology Study with Cryoablation     2.  I authorize Printed: 2020   1:37 PM  Patient Name: Bayron Flowers        : 1945       Medical Record #: [id number]

## (undated) NOTE — LETTER
[address]  [address]  Consent for Procedure/Sedation  Date: 7/15/25         Time: 08:00    I hereby authorize                   , my physician and his/her assistants (if applicable), which may include medical students, residents, and/or fellows, to perform the following surgical operation/ procedure and administer such anesthesia as may be determined necessary by my physician: Transesophageal Echocardiogram  on Linda Hernandez  2.   I recognize that during the surgical operation/procedure, unforeseen conditions may necessitate additional or different procedures than those listed above.  I, therefore, further authorize and request that the above-named surgeon, assistants, or designees perform such procedures as are, in their judgment, necessary and desirable.    3.   My surgeon/physician has discussed prior to my surgery the potential benefits, risks and side effects of this procedure; the likelihood of achieving goals; and potential problems that might occur during recuperation.  They also discussed reasonable alternatives to the procedure, including risks, benefits, and side effects related to the alternatives and risks related to not receiving this procedure.  I have had all my questions answered and I acknowledge that no guarantee has been made as to the result that may be obtained.    4.   Should the need arise during my operation/procedure, which includes change of level of care prior to discharge, I also consent to the administration of blood and/or blood products.  Further, I understand that despite careful testing and screening of blood or blood products by collecting agencies, I may still be subject to ill effects as a result of receiving a blood transfusion and/or blood products.  The following are some, but not all, of the potential risks that can occur: fever and allergic reactions, hemolytic reactions, transmission of diseases such as Hepatitis, AIDS and  Cytomegalovirus (CMV) and fluid overload.  In the event that I wish to have an autologous transfusion of my own blood, or a directed donor transfusion, I will discuss this with my physician.   Check only if Refusing Blood or Blood Products  I understand refusal of blood or blood products as deemed necessary by my physician may have serious consequences to my condition to include possible death. I hereby assume responsibility for my refusal and release the hospital, its personnel, and my physicians from any responsibility for the consequences of my refusal.         o  Refuse         5.   I authorize the use of any specimen, organs, tissues, body parts or foreign objects that may be removed from my body during the operation/procedure for diagnosis, research or teaching purposes and their subsequent disposal by hospital authorities.  I also authorize the release of specimen test results and/or written reports to my treating physician on the hospital medical staff or other referring or consulting physicians involved in my care, at the discretion of the Pathologist or my treating physician.    6.   I consent to the photographing or videotaping of the operations or procedures to be performed, including appropriate portions of my body for medical, scientific, or educational purposes, provided my identity is not revealed by the pictures or by descriptive texts accompanying them.  If the procedure has been photographed/videotaped, the surgeon will obtain the original picture, image, videotape or CD.  The hospital will not be responsible for storage, release or maintenance of the picture, image, tape or CD.    7.   I consent to the presence of a  or observers in the operating room as deemed necessary by my physician or their designees.    8.   I recognize that in the event my procedure results in extended X-Ray/fluoroscopy time, I may develop a skin reaction.    9. If I have a Do Not Attempt Resuscitation  (DNAR) order in place, that status will be suspended while in the operating room, procedural suite, and during the recovery period unless otherwise explicitly stated by me (or a person authorized to consent on my behalf). The surgeon or my attending physician will determine when the applicable recovery period ends for purposes of reinstating the DNAR order.  10. Patients having a sterilization procedure: I understand that if the procedure is successful the results will be permanent and it will therefore be impossible for me to inseminate, conceive, or bear children.  I also understand that the procedure is intended to result in sterility, although the result has not been guaranteed.   11. I acknowledge that my physician has explained sedation/analgesia administration to me including the risk and benefits I consent to the administration of sedation/analgesia as may be necessary or desirable in the judgment of my physician.    I CERTIFY THAT I HAVE READ AND FULLY UNDERSTAND THE ABOVE CONSENT TO OPERATION and/or OTHER PROCEDURE.        ____________________________________       _________________________________      ______________________________  Signature of Patient         Signature of Responsible Person        Printed Name of Responsible Person        ____________________________________      _________________________________      ______________________________       Signature of Witness          Relationship to Patient                       Date                                       Time  Patient Name: Linda Hernandez  : 1945    Reviewed: 2024   Printed: 2025  Medical Record #: [id number] Page 1 of 1

## (undated) NOTE — MR AVS SNAPSHOT
After Visit Summary   5/11/2017    Bernardino Jaffe    MRN: [id number]           Diagnoses this Visit     Malignant neoplasm of female breast [facility])    -  Primary     Encounter for screening mammogram for malignant neoplasm of breast           Aller discharge instructions in [facility] by going to Visits < Admission Summaries. If you've been to the Emergency Department or your doctor's office, you can view your past visit information in [facility] by going to Visits < Visit Summaries. [employer] questions?

## (undated) NOTE — LETTER
[facility] Testing Department  Phone: [phone]  OUTSIDE TESTING RESULT REQUEST      TO:   Dr Claire Waterman Date: 6/12/18    FAX #: [phone]     IMPORTANT: FOR YOUR IMMEDIATE ATTENTION  Please FAX all test results listed below to: 61

## (undated) NOTE — LETTER
[facility] [address], [address]  Consent for Procedure/Sedation    Date:        Time:       1. I authorize the performance upon [facility] the following:  CARDIOVERSION     2.  I authorize Dr. Cory White (and whomever is : 1945       Medical Record #: [id number]

## (undated) NOTE — IP AVS SNAPSHOT
Patient Demographics     Address  [address] [facility] [address] Phone  [phone] [facility])  [phone] (Mobile) *Preferred* E-mail Address  [email or web address]      Emergency Contact(s)     Name Relation Home Work Mobile    Ramirez activities with your surgeon. Restrictions  ? For knee replacement surgery, follow instructions provided by physical therapy. ? Do NOT put a pillow under your knee as it may be more difficult to straighten afterwards. No smoking  ? Avoid smoking. Discomfort  ? Surgical discomfort is normal for one to two months. ? Have realistic goals and keep a positive outlook. ? You may need pain medication regularly (every 4-6 hours) the first 2 weeks and then begin to decrease how often you are taking it. Prevention of infection and promotion of healing  ? Good hand washing is important. Everyone should wash their hands or use hand  as soon as they walk in your house-whether they live there or are visiting. ?  Keep bed linen/clothing freshly launde ? Increased pain at incision not relieved by pain medication. Signs of blood clot  ? Pain, excessive tenderness, redness, or swelling in leg or calf (other than incision site).   (CALL SURGEON)      Go directly to the ER or CALL 911 if  you:  ? b Lee Ramsay DO. Schedule an appointment as soon as possible for a visit in 1 week. Specialty:  Family Medicine  Contact information:  Reji Wu MD. Call in 2 weeks. OSTEO BI-FLEX JOINT SHIELD OR  Next dose due:  6/30/18 @ 9 am      Take 2 capsules by mouth daily. simvastatin 20 MG Tabs  Commonly known as:  ZOCOR  Next dose due:  6/29/18 @ 9 pm      Take 1 tablet (20 mg total) by mouth nightly.    Mickiel Glimpse [id number] ferrous sulfate EC tab 325 mg 06/29/18 0852 Given      [id number] metoprolol Tartrate (LOPRESSOR) tab 25 mg 06/28/18 1727 Given      [id number] metoprolol Tartrate (LOPRESSOR) tab 25 mg 06/29/18 0654 Given                    Recent Vital Signs Ordering provider:  Maddy Gaona MD  06/28/18 2538 Resulting lab:  [facility]    Specimen Information    Type Source Collected On   Blood — 06/28/18 0196          Components    Component Value Reference Range Flag Lab   TSH 0.326 0.350 - 5.500 mIU/mL L E • Pneumonia due to organism 2014    LEFT LUNG   • Primary osteoarthritis of both hips 5/16/2017   • Primary osteoarthritis of left knee 5/16/2017   • Primary osteoarthritis of right knee 05/16/2017   • Pulmonary embolism ([facility] [address] 09/19/2003    Occurred after Abdomen:  Soft, non-distended, non-tender, with no rebound or guarding. Extremities:  No lower extremity edema noted. Without clubbing or cyanosis.     The right knee is tender at the medial and lateral joint lines, with crepitus on range of motion    La intolerance, diarrhea, constipation. No hx of thyroid disease. Patient adopted. No neck swelling, voice changes, dysphagia. A comprehensive 12 point review of systems was completed. Pertinent positives and negatives noted in the the HPI.     Histor Problem Relation Age of Onset   • Breast Cancer Self 78   • Stroke Mother    • Heart Attack Father 45      reports that she has never smoked. She has never used smokeless tobacco. She reports that she does not drink alcohol or use drugs. [JS.2]    Allergies 12/27/17 : 224 lb 8 oz (101.8 kg)[JS.2]    Constitutional[JS.1] /59 (BP Location: Right arm)   Pulse 97   Temp 98.5 °F (36.9 °C) (Oral)   Resp 18   Ht 65.25\"   Wt 228 lb 2.8 oz (103.5 kg)   SpO2 97%   BMI 37.68 kg/m²   Body mass index is 37.68 kg/m² Homar Awan MD  Endocrinology, Diabetes, and Metabolism  [facility]  6/29/2018  1:13 PM[JS.1]              Electronically signed by Holden Crowder MD on 6/29/2018  1:21 PM   Attribution Melanie Alonzo. 1 - Holden Crowder MD on 6/29/2018  1:1 • Pulmonary embolism ([facility] [address] 09/19/2003    Occurred after a long plane ride [place]   • Visual impairment     READERS[BR.2]       Past Surgical History[BR.1]  Past Surgical History:  10/14/08: CATARACT      Comment: Right eye  10/28/08: CATARACT      Comment: How much help from another person does the patient currently need. ..[BR.1]   -   Moving to and from a bed to a chair (including a wheelchair)?: None   -   Need to walk in hospital room?: None   -   Climbing 3-5 steps with a railing?: A Little[BR.2]       A Glut Sets 10 reps 15 reps   Hip Abd/Add 10 reps 15 reps   Heel slides 10 reps 15 reps   Saq 10 reps 15 reps   SLR 10 reps 15 reps   Sitting Knee Flexion 10 reps 15 reps   Standing heel/toe raises 10 reps 15 reps   Standing knee flexion 10 reps 15 reps   Ex assistance level: supervison[BR.1]  met[BR. 2]    Goal #3     Patient is able to ambulate 300 feet with assistive device at assistance level:Supervision[BR.1]  Met 6/28/18[BR.2]    Goal #4     Patient will negotiate 4 stairs/one curb w/ assistive device and Diffuse fatty infiltration of the liver on ultrasound August 2011   • Osteoarthritis    • Pneumonia due to organism 2014    LEFT LUNG   • Primary osteoarthritis of both hips 5/16/2017   • Primary osteoarthritis of left knee 5/16/2017   • Primary osteoarth Weight Bearing Restriction: R lower extremity        R Lower Extremity: Weight Bearing as Tolerated       PAIN ASSESSMENT  Rating: 3  Location: Right knee @ surgical site  Management Techniques: Activity promotion; Body mechanics;Breathing techniques;Relaxa Gait Assistance: Maximum assistance (Actual assist - CGA to supervision)  Distance (ft): 50 ft  Assistive Device: Rolling walker  Pattern: R Decreased stance time  Stoop/Curb Assistance: Not tested  Comment : Above score is based on FIM definations    Skil safety. Not appropriate for stairs this time  The patient is below baseline and would benefit from skilled inpatient PT to address the above deficits to assist patient in returning to prior to level of function.   DISCHARGE RECOMMENDATIONS  PT Discharge Ady :  Blanquita Ojeda OT (Occupational Therapist)       OCCUPATIONAL THERAPY QUICK EVALUATION - INPATIENT    Room Number: 386/386-A  Evaluation Date: 6/28/2018     Type of Evaluation: Quick Eval  Presenting Problem: s/p R TKA 6/27/18    Physician Josesito Moser 11/19/08: OTHER SURGICAL HISTORY      Comment: Oral surgery-2 cyst and 2 wisdom teeth  3/13/09: OTHER SURGICAL HISTORY      Comment: Kidney stones removed  1/20/10: OTHER SURGICAL HISTORY      Comment: Colposcopy  12/11/12: OTHER SURGICAL HISTORY      Comm -   Putting on and taking off regular upper body clothing?: None  -   Taking care of personal grooming such as brushing teeth?: None  -   Eating meals?: None    AM-PAC Score:  Score: 21  Approx Degree of Impairment: 32.79%  Standardized Score (AM-PAC Scale have supervision at home from . Patient also with good recall and return demo following knee/surgical precautions. Patient reports no further questions or concerns about safe return to I/ADL tasks. No further OT services needed at this time.      Reshma Red Pneumococcal (Prevnar 13) 10/20/15     Pneumovax 23 10/16/13     TDAP 12/27/17     Zoster Vaccine Live (Zostavax) 09/24/07     Zoster Vaccine Recombinant Adjuvanted (Shingrix) 04/11/18       Future Appointments        Provider Cristina Hanna    7/10/20

## (undated) NOTE — LETTER
[address]  [address]  Consent for Procedure/Sedation  Date: 7/15/25         Time: 08:00    I hereby authorize                , my physician and his/her assistants (if applicable), which may include medical students, residents, and/or fellows, to perform the following surgical operation/ procedure and administer such anesthesia as may be determined necessary by my physician: Cardioversion on Linda Hernandez  2.   I recognize that during the surgical operation/procedure, unforeseen conditions may necessitate additional or different procedures than those listed above.  I, therefore, further authorize and request that the above-named surgeon, assistants, or designees perform such procedures as are, in their judgment, necessary and desirable.    3.   My surgeon/physician has discussed prior to my surgery the potential benefits, risks and side effects of this procedure; the likelihood of achieving goals; and potential problems that might occur during recuperation.  They also discussed reasonable alternatives to the procedure, including risks, benefits, and side effects related to the alternatives and risks related to not receiving this procedure.  I have had all my questions answered and I acknowledge that no guarantee has been made as to the result that may be obtained.    4.   Should the need arise during my operation/procedure, which includes change of level of care prior to discharge, I also consent to the administration of blood and/or blood products.  Further, I understand that despite careful testing and screening of blood or blood products by collecting agencies, I may still be subject to ill effects as a result of receiving a blood transfusion and/or blood products.  The following are some, but not all, of the potential risks that can occur: fever and allergic reactions, hemolytic reactions, transmission of diseases such as Hepatitis, AIDS and Cytomegalovirus (CMV)  and fluid overload.  In the event that I wish to have an autologous transfusion of my own blood, or a directed donor transfusion, I will discuss this with my physician.   Check only if Refusing Blood or Blood Products  I understand refusal of blood or blood products as deemed necessary by my physician may have serious consequences to my condition to include possible death. I hereby assume responsibility for my refusal and release the hospital, its personnel, and my physicians from any responsibility for the consequences of my refusal.         o  Refuse         5.   I authorize the use of any specimen, organs, tissues, body parts or foreign objects that may be removed from my body during the operation/procedure for diagnosis, research or teaching purposes and their subsequent disposal by hospital authorities.  I also authorize the release of specimen test results and/or written reports to my treating physician on the hospital medical staff or other referring or consulting physicians involved in my care, at the discretion of the Pathologist or my treating physician.    6.   I consent to the photographing or videotaping of the operations or procedures to be performed, including appropriate portions of my body for medical, scientific, or educational purposes, provided my identity is not revealed by the pictures or by descriptive texts accompanying them.  If the procedure has been photographed/videotaped, the surgeon will obtain the original picture, image, videotape or CD.  The hospital will not be responsible for storage, release or maintenance of the picture, image, tape or CD.    7.   I consent to the presence of a  or observers in the operating room as deemed necessary by my physician or their designees.    8.   I recognize that in the event my procedure results in extended X-Ray/fluoroscopy time, I may develop a skin reaction.    9. If I have a Do Not Attempt Resuscitation (DNAR) order in place,  that status will be suspended while in the operating room, procedural suite, and during the recovery period unless otherwise explicitly stated by me (or a person authorized to consent on my behalf). The surgeon or my attending physician will determine when the applicable recovery period ends for purposes of reinstating the DNAR order.  10. Patients having a sterilization procedure: I understand that if the procedure is successful the results will be permanent and it will therefore be impossible for me to inseminate, conceive, or bear children.  I also understand that the procedure is intended to result in sterility, although the result has not been guaranteed.   11. I acknowledge that my physician has explained sedation/analgesia administration to me including the risk and benefits I consent to the administration of sedation/analgesia as may be necessary or desirable in the judgment of my physician.    I CERTIFY THAT I HAVE READ AND FULLY UNDERSTAND THE ABOVE CONSENT TO OPERATION and/or OTHER PROCEDURE.        ____________________________________       _________________________________      ______________________________  Signature of Patient         Signature of Responsible Person        Printed Name of Responsible Person        ____________________________________      _________________________________      ______________________________       Signature of Witness          Relationship to Patient                       Date                                       Time  Patient Name: Linda Hernandez  : 1945    Reviewed: 2024   Printed: 2025  Medical Record #: [id number] Page 1 of 1

## (undated) NOTE — MR AVS SNAPSHOT
After Visit Summary   3/2/2017    Enrike Zamudio    MRN: [id number]           Visit Information        Provider Department Dept Phone    3/2/2017  1:45 PM DO Jamison Azevedo Beder [phone]      Your Vitals Were     BP Pulse Temp(Src CBC W/ DIFFERENTIAL [07399701 CUSTOM]     CBC WITH DIFFERENTIAL WITH PLATELET [4778735 CUSTOM]     COMP METABOLIC PANEL (14) [9304302 CUSTOM]     DEPRESSION SCREEN ANNUAL [ [facility]]     HCV ANTIBODY [2189513 CUSTOM]     LIPID PANEL [6995697 CUSTOM] • Age 72 years or older   • History of gestational diabetes or birth of baby weighing more than 9 pounds     Covered at least every 3 years,         No results found for: GLUCOSE, GLU Medicare covers annually or at 6-month intervals for prediabetic patient Biennial benefit unless patient has history of long-term glucocorticoid use for medical condition    No results found for this or any previous visit. No flowsheet data found.     Recommended for 2 year anniversary or as ordered in After Visit Summary   Pap Recommended Websites for Advanced Directives    SeekAl[employer].no. org/publications/Documents/personal_dec. pdf  An information packet, including necessary form from the [employer] website. http://www. [place]ph.state. [place]/public/books/advin EKG - covered if needed at Welcome to Medicare, and non-screening if indicated for medical reasons Electrocardiogram date Routine EKG is not a screening covered service except at the Welcome to Medicare Visit    Abdominal aortic aneurysm screening (once b There are no preventive care reminders to display for this patient. Chlamydia  Annually if high risk No results found for: CHLAMYDIA No flowsheet data found.     Screening Mammogram      Mammogram    Recommend Annually to at least age 76, and as needed information including definitions of the different types of Advance Directives. It also has the State forms available on it's website for anyone to review and print using their home computer and printer. (the forms are also available in [address])  www. [email or web address]vin

## (undated) NOTE — LETTER
Last Revised 02/07/06  Obstructive Sleep Apnea Questionnaire    Clinical signs and symptoms suggesting the possibility of MARY LOU    1. Predisposing physical characteristics (positive with any of the following present)  ? BMI 35kg/m²  ?  Craniofacial abnormalit pauses which are frightening to the observer, patient regularly falls asleep within minutes after being left unstimulated) in which case they should be treated as though they have severe sleep apnea.     The sleep laboratory’s assessment (none, mild, modera Point Total for B           C. Requirement for postoperative opioids.                Opioid requirement             Points   None 0    Low dose oral opiod 1    High dose oral opioids, parenteral or neuraxial opiods 3      Point Total for C        Estimation

## (undated) NOTE — LETTER
[address]  [address]  Consent for Procedure/Sedation  Date: 7/15/25         Time: 08:00    I hereby authorize                 , my physician and his/her assistants (if applicable), which may include medical students, residents, and/or fellows, to perform the following surgical operation/ procedure and administer such anesthesia as may be determined necessary by my physician:  Operation/Procedure name (s)  Cardiac Catheterization, Left Ventricular Cineangiography, Bilateral Selective Coronary Angiography and/or Right Heart Catheterization; possible Percutaneous Transluminal Coronary Angioplasty, Coronary Atherectomy, Coronary Stent, Intracoronary Thrombolytic therapy, Antiplatelet therapy and/or Intravascular Ultrasound on Linda Hernandez   2.   I recognize that during the surgical operation/procedure, unforeseen conditions may necessitate additional or different procedures than those listed above.  I, therefore, further authorize and request that the above-named surgeon, assistants, or designees perform such procedures as are, in their judgment, necessary and desirable.    3.   My surgeon/physician has discussed prior to my surgery the potential benefits, risks and side effects of this procedure; the likelihood of achieving goals; and potential problems that might occur during recuperation.  They also discussed reasonable alternatives to the procedure, including risks, benefits, and side effects related to the alternatives and risks related to not receiving this procedure.  I have had all my questions answered and I acknowledge that no guarantee has been made as to the result that may be obtained.    4.   Should the need arise during my operation/procedure, which includes change of level of care prior to discharge, I also consent to the administration of blood and/or blood products.  Further, I understand that despite careful testing and screening of blood or blood  products by collecting agencies, I may still be subject to ill effects as a result of receiving a blood transfusion and/or blood products.  The following are some, but not all, of the potential risks that can occur: fever and allergic reactions, hemolytic reactions, transmission of diseases such as Hepatitis, AIDS and Cytomegalovirus (CMV) and fluid overload.  In the event that I wish to have an autologous transfusion of my own blood, or a directed donor transfusion, I will discuss this with my physician.  Check only if Refusing Blood or Blood Products  I understand refusal of blood or blood products as deemed necessary by my physician may have serious consequences to my condition to include possible death. I hereby assume responsibility for my refusal and release the hospital, its personnel, and my physicians from any responsibility for the consequences of my refusal.          o  Refuse      5.   I authorize the use of any specimen, organs, tissues, body parts or foreign objects that may be removed from my body during the operation/procedure for diagnosis, research or teaching purposes and their subsequent disposal by hospital authorities.  I also authorize the release of specimen test results and/or written reports to my treating physician on the hospital medical staff or other referring or consulting physicians involved in my care, at the discretion of the Pathologist or my treating physician.    6.   I consent to the photographing or videotaping of the operations or procedures to be performed, including appropriate portions of my body for medical, scientific, or educational purposes, provided my identity is not revealed by the pictures or by descriptive texts accompanying them.  If the procedure has been photographed/videotaped, the surgeon will obtain the original picture, image, videotape or CD.  The hospital will not be responsible for storage, release or maintenance of the picture, image, tape or CD.    7.    I consent to the presence of a  or observers in the operating room as deemed necessary by my physician or their designees.    8.   I recognize that in the event my procedure results in extended X-Ray/fluoroscopy time, I may develop a skin reaction.    9. If I have a Do Not Attempt Resuscitation (DNAR) order in place, that status will be suspended while in the operating room, procedural suite, and during the recovery period unless otherwise explicitly stated by me (or a person authorized to consent on my behalf). The surgeon or my attending physician will determine when the applicable recovery period ends for purposes of reinstating the DNAR order.  10. Patients having a sterilization procedure: I understand that if the procedure is successful the results will be permanent and it will therefore be impossible for me to inseminate, conceive, or bear children.  I also understand that the procedure is intended to result in sterility, although the result has not been guaranteed.   11. I acknowledge that my physician has explained sedation/analgesia administration to me including the risk and benefits I consent to the administration of sedation/analgesia as may be necessary or desirable in the judgment of my physician.    I CERTIFY THAT I HAVE READ AND FULLY UNDERSTAND THE ABOVE CONSENT TO OPERATION and/or OTHER PROCEDURE.      ____________________________________       _________________________________      ______________________________  Signature of Patient         Signature of Responsible Person        Printed Name of Responsible Person   ____________________________________      _________________________________      ______________________________       Signature of Witness          Relationship to Patient                       Date                                       Time  Patient Name: Linda Hernandez  : 1945    Reviewed: 2024   Printed: 2025  Medical Record #:  [id number] Page 1 of 1

## (undated) NOTE — LETTER
[address]  [address]  Consent for Procedure/Sedation  Date: 7/16/2025         Time: 1025    I hereby authorize Dr. Samuel Peterson, my physician and his/her assistants (if applicable), which may include medical students, residents, and/or fellows, to perform the following surgical operation/ procedure and administer such anesthesia as may be determined necessary by my physician: Cardioversion (DCCV) on Linda Hernandez  2.   I recognize that during the surgical operation/procedure, unforeseen conditions may necessitate additional or different procedures than those listed above.  I, therefore, further authorize and request that the above-named surgeon, assistants, or designees perform such procedures as are, in their judgment, necessary and desirable.    3.   My surgeon/physician has discussed prior to my surgery the potential benefits, risks and side effects of this procedure; the likelihood of achieving goals; and potential problems that might occur during recuperation.  They also discussed reasonable alternatives to the procedure, including risks, benefits, and side effects related to the alternatives and risks related to not receiving this procedure.  I have had all my questions answered and I acknowledge that no guarantee has been made as to the result that may be obtained.    4.   Should the need arise during my operation/procedure, which includes change of level of care prior to discharge, I also consent to the administration of blood and/or blood products.  Further, I understand that despite careful testing and screening of blood or blood products by collecting agencies, I may still be subject to ill effects as a result of receiving a blood transfusion and/or blood products.  The following are some, but not all, of the potential risks that can occur: fever and allergic reactions, hemolytic reactions, transmission of diseases such as Hepatitis, AIDS and Cytomegalovirus  (CMV) and fluid overload.  In the event that I wish to have an autologous transfusion of my own blood, or a directed donor transfusion, I will discuss this with my physician.   Check only if Refusing Blood or Blood Products  I understand refusal of blood or blood products as deemed necessary by my physician may have serious consequences to my condition to include possible death. I hereby assume responsibility for my refusal and release the hospital, its personnel, and my physicians from any responsibility for the consequences of my refusal.         o  Refuse         5.   I authorize the use of any specimen, organs, tissues, body parts or foreign objects that may be removed from my body during the operation/procedure for diagnosis, research or teaching purposes and their subsequent disposal by hospital authorities.  I also authorize the release of specimen test results and/or written reports to my treating physician on the hospital medical staff or other referring or consulting physicians involved in my care, at the discretion of the Pathologist or my treating physician.    6.   I consent to the photographing or videotaping of the operations or procedures to be performed, including appropriate portions of my body for medical, scientific, or educational purposes, provided my identity is not revealed by the pictures or by descriptive texts accompanying them.  If the procedure has been photographed/videotaped, the surgeon will obtain the original picture, image, videotape or CD.  The hospital will not be responsible for storage, release or maintenance of the picture, image, tape or CD.    7.   I consent to the presence of a  or observers in the operating room as deemed necessary by my physician or their designees.    8.   I recognize that in the event my procedure results in extended X-Ray/fluoroscopy time, I may develop a skin reaction.    9. If I have a Do Not Attempt Resuscitation (DNAR) order in  place, that status will be suspended while in the operating room, procedural suite, and during the recovery period unless otherwise explicitly stated by me (or a person authorized to consent on my behalf). The surgeon or my attending physician will determine when the applicable recovery period ends for purposes of reinstating the DNAR order.  10. Patients having a sterilization procedure: I understand that if the procedure is successful the results will be permanent and it will therefore be impossible for me to inseminate, conceive, or bear children.  I also understand that the procedure is intended to result in sterility, although the result has not been guaranteed.   11. I acknowledge that my physician has explained sedation/analgesia administration to me including the risk and benefits I consent to the administration of sedation/analgesia as may be necessary or desirable in the judgment of my physician.    I CERTIFY THAT I HAVE READ AND FULLY UNDERSTAND THE ABOVE CONSENT TO OPERATION and/or OTHER PROCEDURE.        ____________________________________       _________________________________      ______________________________  Signature of Patient         Signature of Responsible Person        Printed Name of Responsible Person        ____________________________________      _________________________________      ______________________________       Signature of Witness          Relationship to Patient                       Date                                       Time  Patient Name: Linda Pughoney  : 1945    Reviewed: 2024   Printed: 2025  Medical Record #: [id number] Page 1 of 1

## (undated) NOTE — LETTER
[address]  [address]  Authorization for Surgical Operation and Procedure     Date: 7/16/2025                                                                                                   Time:1024  I hereby authorize Dr. Samuel Peterson, my physician and his/her assistants (if applicable), which may include medical students, residents, and/or fellows, to perform the following surgical operation/ procedure and administer such anesthesia as may be determined necessary by my physician:  Transesophageal Echocardiogram  on Linda Hernandez   2.   I recognize that during the surgical operation/procedure, unforeseen conditions may necessitate additional or different procedures than those listed above.  I, therefore, further authorize and request that the above-named surgeon, assistants, or designees perform such procedures as are, in their judgment, necessary and desirable.    3.   My surgeon/physician has discussed prior to my surgery the potential benefits, risks and side effects of this procedure; the likelihood of achieving goals; and potential problems that might occur during recuperation.  They also discussed reasonable alternatives to the procedure, including risks, benefits, and side effects related to the alternatives and risks related to not receiving this procedure.  I have had all my questions answered and I acknowledge that no guarantee has been made as to the result that may be obtained.    4.   Should the need arise during my operation/procedure, which includes change of level of care prior to discharge, I also consent to the administration of blood and/or blood products.  Further, I understand that despite careful testing and screening of blood or blood products by collecting agencies, I may still be subject to ill effects as a result of receiving a blood transfusion and/or blood products.  The following are some, but not all, of the potential risks that can  occur: fever and allergic reactions, hemolytic reactions, transmission of diseases such as Hepatitis, AIDS and Cytomegalovirus (CMV) and fluid overload.  In the event that I wish to have an autologous transfusion of my own blood, or a directed donor transfusion, I will discuss this with my physician.  Check only if Refusing Blood or Blood Products  I understand refusal of blood or blood products as deemed necessary by my physician may have serious consequences to my condition to include possible death. I hereby assume responsibility for my refusal and release the hospital, its personnel, and my physicians from any responsibility for the consequences of my refusal.          o  Refuse      5.   I authorize the use of any specimen, organs, tissues, body parts or foreign objects that may be removed from my body during the operation/procedure for diagnosis, research or teaching purposes and their subsequent disposal by hospital authorities.  I also authorize the release of specimen test results and/or written reports to my treating physician on the hospital medical staff or other referring or consulting physicians involved in my care, at the discretion of the Pathologist or my treating physician.    6.   I consent to the photographing or videotaping of the operations or procedures to be performed, including appropriate portions of my body for medical, scientific, or educational purposes, provided my identity is not revealed by the pictures or by descriptive texts accompanying them.  If the procedure has been photographed/videotaped, the surgeon will obtain the original picture, image, videotape or CD.  The hospital will not be responsible for storage, release or maintenance of the picture, image, tape or CD.    7.   I consent to the presence of a  or observers in the operating room as deemed necessary by my physician or their designees.    8.   I recognize that in the event my procedure results in  extended X-Ray/fluoroscopy time, I may develop a skin reaction.    9. If I have a Do Not Attempt Resuscitation (DNAR) order in place, that status will be suspended while in the operating room, procedural suite, and during the recovery period unless otherwise explicitly stated by me (or a person authorized to consent on my behalf). The surgeon or my attending physician will determine when the applicable recovery period ends for purposes of reinstating the DNAR order.  10. Patients having a sterilization procedure: I understand that if the procedure is successful the results will be permanent and it will therefore be impossible for me to inseminate, conceive, or bear children.  I also understand that the procedure is intended to result in sterility, although the result has not been guaranteed.   11. I acknowledge that my physician has explained sedation/analgesia administration to me including the risk and benefits I consent to the administration of sedation/analgesia as may be necessary or desirable in the judgment of my physician.    I CERTIFY THAT I HAVE READ AND FULLY UNDERSTAND THE ABOVE CONSENT TO OPERATION and/or OTHER PROCEDURE.    _________________________________________  __________________________________  Signature of Patient     Signature of Responsible Person         ___________________________________         Printed Name of Responsible Person           _________________________________                 Relationship to Patient  _________________________________________  ______________________________  Signature of Witness          Date  Time      Patient Name: Linda Hernandez     : 1945                 Printed: 2025     Medical Record #: [id number]                     Page 1 [address]  [address]    Consent for Anesthesia    I, Linda Hernandez agree to be cared for by an anesthesiologist, who is  specially trained to monitor me and give me medicine to put me to sleep or keep me comfortable during my procedure    I understand that my anesthesiologist is not an employee or agent of [facility] or [employer]. He or she works for [employer].    As the patient asking for anesthesia services, I agree to:  Allow the anesthesiologist (anesthesia doctor) to give me medicine and do additional procedures as necessary. Some examples are: Starting or using an “IV” to give me medicine, fluids or blood during my procedure, and having a breathing tube placed to help me breathe when I’m asleep (intubation). In the event that my heart stops working properly, I understand that my anesthesiologist will make every effort to sustain my life, unless otherwise directed by [facility] Do Not Resuscitate documents.  Tell my anesthesia doctor before my procedure:  If I am pregnant.  The last time that I ate or drank.  All of the medicines I take (including prescriptions, herbal supplements, and pills I can buy without a prescription (including street drugs/illegal medications). Failure to inform my anesthesiologist about these medicines may increase my risk of anesthetic complications.  If I am allergic to anything or have had a reaction to anesthesia before.  I understand how the anesthesia medicine will help me (benefits).  I understand that with any type of anesthesia medicine there are risks:  The most common risks are: nausea, vomiting, sore throat, muscle soreness, damage to my eyes, mouth, or teeth (from breathing tube placement).  Rare risks include: remembering what happened during my procedure, allergic reactions to medications, injury to my airway, heart, lungs, vision, nerves, or muscles and in extremely rare instances death.  My doctor has explained to me other choices available to me for my care (alternatives).  Pregnant Patients (“epidural”):  I understand that the risks of having  an epidural (medicine given into my back to help control pain during labor), include itching, low blood pressure, difficulty urinating, headache or slowing of the baby’s heart. Very rare risks include infection, bleeding, seizure, irregular heart rhythms and nerve injury.  Regional Anesthesia (“spinal”, “epidural”, & “nerve blocks”):  I understand that rare but potential complications include headache, bleeding, infection, seizure, irregular heart rhythms, and nerve injury.    I can change my mind about having anesthesia services at any time before I get the medicine.    _____________________________________________________________________________  Patient (or Representative) Signature/Relationship to Patient  Date   Time    _____________________________________________________________________________   Name (if used)    Language/Organization   Time    _____________________________________________________________________________  Anesthesiologist Signature     Date   Time  I have discussed the procedure and information above with the patient (or patient’s representative) and answered their questions. The patient or their representative has agreed to have anesthesia services.    _____________________________________________________________________________  Witness        Date   Time  I have verified that the signature is that of the patient or patient’s representative, and that it was signed before the procedure  Patient Name: Linda Hernandez     : 1945                 Printed: 2025     Medical Record #: [id number]                     Page 2 of 2

## (undated) NOTE — LETTER
01/21/21        [address] [id number]      Dear David Cordon,    [facility] records indicate that you have outstanding lab work and or testing that was ordered for you and has not yet been completed:  Orders Placed This Enc

## (undated) NOTE — LETTER
[facility]  [address] [facility], [address] [place] [address]    Consent for Operation    Date: __________________    Time: _______________    1.  I authorize the performance upon [facility] the following operation:    Procedure(s):  RIGHT TOTAL procedure has been videotaped, the surgeon will obtain the original videotape. The hospital will not be responsible for storage or maintenance of this tape.     6. For the purpose of advancing medical education, I consent to the admittance of observers to t STATEMENTS REQUIRING INSERTION OR COMPLETION WERE FILLED IN.     Signature of Patient:   ___________________________    When the patient is a minor or mentally incompetent to give consent:  Signature of person authorized to consent for patient: ____________ supplements, and pills I can buy without a prescription (including street drugs/illegal medications). Failure to inform my anesthesiologist about these medicines may increase my risk of anesthetic complications.   · If I am allergic to anything or have had Anesthesiologist Signature     Date   Time  I have discussed the procedure and information above with the patient (or patient’s representative) and answered their questions. The patient or their representative has agreed to have anesthesia services.     ___

## (undated) NOTE — LETTER
[address]  [address]  Consent for Procedure/Sedation  Date: 7/13/25         Time: 1600    I hereby authorize  Dr. Mcgill, my physician and his/her assistants (if applicable), which may include medical students, residents, and/or fellows, to perform the following surgical operation/ procedure and administer such anesthesia as may be determined necessary by my physician: Cardioversion, Automatic Defibrillator check  on Linda Hernandez  2.   I recognize that during the surgical operation/procedure, unforeseen conditions may necessitate additional or different procedures than those listed above.  I, therefore, further authorize and request that the above-named surgeon, assistants, or designees perform such procedures as are, in their judgment, necessary and desirable.    3.   My surgeon/physician has discussed prior to my surgery the potential benefits, risks and side effects of this procedure; the likelihood of achieving goals; and potential problems that might occur during recuperation.  They also discussed reasonable alternatives to the procedure, including risks, benefits, and side effects related to the alternatives and risks related to not receiving this procedure.  I have had all my questions answered and I acknowledge that no guarantee has been made as to the result that may be obtained.    4.   Should the need arise during my operation/procedure, which includes change of level of care prior to discharge, I also consent to the administration of blood and/or blood products.  Further, I understand that despite careful testing and screening of blood or blood products by collecting agencies, I may still be subject to ill effects as a result of receiving a blood transfusion and/or blood products.  The following are some, but not all, of the potential risks that can occur: fever and allergic reactions, hemolytic reactions, transmission of diseases such as Hepatitis, AIDS  and Cytomegalovirus (CMV) and fluid overload.  In the event that I wish to have an autologous transfusion of my own blood, or a directed donor transfusion, I will discuss this with my physician.   Check only if Refusing Blood or Blood Products  I understand refusal of blood or blood products as deemed necessary by my physician may have serious consequences to my condition to include possible death. I hereby assume responsibility for my refusal and release the hospital, its personnel, and my physicians from any responsibility for the consequences of my refusal.         o  Refuse         5.   I authorize the use of any specimen, organs, tissues, body parts or foreign objects that may be removed from my body during the operation/procedure for diagnosis, research or teaching purposes and their subsequent disposal by hospital authorities.  I also authorize the release of specimen test results and/or written reports to my treating physician on the hospital medical staff or other referring or consulting physicians involved in my care, at the discretion of the Pathologist or my treating physician.    6.   I consent to the photographing or videotaping of the operations or procedures to be performed, including appropriate portions of my body for medical, scientific, or educational purposes, provided my identity is not revealed by the pictures or by descriptive texts accompanying them.  If the procedure has been photographed/videotaped, the surgeon will obtain the original picture, image, videotape or CD.  The hospital will not be responsible for storage, release or maintenance of the picture, image, tape or CD.    7.   I consent to the presence of a  or observers in the operating room as deemed necessary by my physician or their designees.    8.   I recognize that in the event my procedure results in extended X-Ray/fluoroscopy time, I may develop a skin reaction.    9. If I have a Do Not Attempt  Resuscitation (DNAR) order in place, that status will be suspended while in the operating room, procedural suite, and during the recovery period unless otherwise explicitly stated by me (or a person authorized to consent on my behalf). The surgeon or my attending physician will determine when the applicable recovery period ends for purposes of reinstating the DNAR order.  10. Patients having a sterilization procedure: I understand that if the procedure is successful the results will be permanent and it will therefore be impossible for me to inseminate, conceive, or bear children.  I also understand that the procedure is intended to result in sterility, although the result has not been guaranteed.   11. I acknowledge that my physician has explained sedation/analgesia administration to me including the risk and benefits I consent to the administration of sedation/analgesia as may be necessary or desirable in the judgment of my physician.    I CERTIFY THAT I HAVE READ AND FULLY UNDERSTAND THE ABOVE CONSENT TO OPERATION and/or OTHER PROCEDURE.        ____________________________________       _________________________________      ______________________________  Signature of Patient         Signature of Responsible Person        Printed Name of Responsible Person        ____________________________________      _________________________________      ______________________________       Signature of Witness          Relationship to Patient                       Date                                       Time  Patient Name: Linda Hernandez  : 1945    Reviewed: 2024   Printed: 2025  Medical Record #: [id number] Page 1 of 1

## (undated) NOTE — MR AVS SNAPSHOT
[address] [place] [id number] [phone]               Thank you for choosing us for your health care visit with Melissa Cornejo DO.   We are glad to serve you and happy to provide you with this summary of your vi Ortho Referral - In Network    Complete by:  As directed    Assoc Dx:  Chronic knee pain, unspecified laterality [V86.445, G89.29]                 Referral Details     Referred By    Referred To    Miroslava Wray, [address] [address] Essential hypertension        Chronic knee pain, unspecified laterality          Instructions and Information about [facility] [address]   Tests on this list are recommended by your physician but may not be covered Covered every 10 years- more often if abnormal Colonoscopy,10 Years due on 01/26/2026 Update Health Maintenance if applicable    Flex Sigmoidoscopy Screen  Covered every 5 years No results found for this or any previous visit. No flowsheet data found. Pneumococcal 23 (Pneumovax)  Covered Once after 65 No orders found for this or any previous visit. Please get once after your 65th birthday    Hepatitis B for Moderate/High Risk       No orders found for this or any previous visit.  Medium/high risk factor INDICATIONS AND SCHEDULE Internal Lab or Procedure External Lab or Procedure   Diabetes Screening      HbgA1C    At Least  Annually for Diabetics No results found for: A1C, HGBA1C No flowsheet data found.     Fasting Blood Sugar (FSB)   Patient must be diag Glaucoma Screening      Ophthalmology Visit   Covered annually for Diabetics, people with Glaucoma family history,   Americans over age 48   Americans over age 72 No flowsheet data found.  OK to schedule if you are in this risk group, make ayala Persons who live in the same house as a HepB virus carrier   Homosexual men   Illicit injectable drug abusers     Tetanus Toxoid- Only covered with a cut with metal- TD and TDaP Not covered by Medicare Part B) No orders found for this or any previous visit hydrochlorothiazide 12.5 MG Caps   Take 12.5 mg by mouth daily. Commonly known as:  MICROZIDE           lisinopril 20 MG Tabs   Take 20 mg by mouth daily. Commonly known as:  PRINIVIL,ZESTRIL           Meloxicam 15 MG Tabs   Take 15 mg by mouth daily. 2 ½ hours per week – spread out over time Use a noreen to keep you motivated   Don’t forget strength training with weights and resistance Set goals and track your progress   You don’t need to join a gym. Home exercises work great.  Put more priority on exe